# Patient Record
Sex: MALE | Race: WHITE | Employment: PART TIME | URBAN - METROPOLITAN AREA
[De-identification: names, ages, dates, MRNs, and addresses within clinical notes are randomized per-mention and may not be internally consistent; named-entity substitution may affect disease eponyms.]

---

## 2018-05-03 ENCOUNTER — OFFICE VISIT (OUTPATIENT)
Dept: PRIMARY CARE CLINIC | Age: 48
End: 2018-05-03
Payer: COMMERCIAL

## 2018-05-03 VITALS
WEIGHT: 210.8 LBS | TEMPERATURE: 97.3 F | BODY MASS INDEX: 31.95 KG/M2 | DIASTOLIC BLOOD PRESSURE: 80 MMHG | RESPIRATION RATE: 16 BRPM | HEART RATE: 87 BPM | SYSTOLIC BLOOD PRESSURE: 150 MMHG | OXYGEN SATURATION: 96 % | HEIGHT: 68 IN

## 2018-05-03 DIAGNOSIS — E03.8 OTHER SPECIFIED HYPOTHYROIDISM: ICD-10-CM

## 2018-05-03 DIAGNOSIS — I10 ESSENTIAL HYPERTENSION: ICD-10-CM

## 2018-05-03 DIAGNOSIS — G47.33 OBSTRUCTIVE SLEEP APNEA SYNDROME: ICD-10-CM

## 2018-05-03 DIAGNOSIS — M25.561 CHRONIC PAIN OF BOTH KNEES: ICD-10-CM

## 2018-05-03 DIAGNOSIS — M25.562 CHRONIC PAIN OF BOTH KNEES: ICD-10-CM

## 2018-05-03 DIAGNOSIS — M79.642 BILATERAL HAND PAIN: ICD-10-CM

## 2018-05-03 DIAGNOSIS — E78.00 PURE HYPERCHOLESTEROLEMIA: ICD-10-CM

## 2018-05-03 DIAGNOSIS — M25.561 CHRONIC PAIN OF BOTH KNEES: Primary | ICD-10-CM

## 2018-05-03 DIAGNOSIS — Z12.5 PROSTATE CANCER SCREENING: ICD-10-CM

## 2018-05-03 DIAGNOSIS — G89.29 CHRONIC PAIN OF BOTH KNEES: Primary | ICD-10-CM

## 2018-05-03 DIAGNOSIS — R06.02 SOB (SHORTNESS OF BREATH): ICD-10-CM

## 2018-05-03 DIAGNOSIS — G44.59 OTHER COMPLICATED HEADACHE SYNDROME: ICD-10-CM

## 2018-05-03 DIAGNOSIS — M25.562 CHRONIC PAIN OF BOTH KNEES: Primary | ICD-10-CM

## 2018-05-03 DIAGNOSIS — M79.641 BILATERAL HAND PAIN: ICD-10-CM

## 2018-05-03 DIAGNOSIS — E53.8 VITAMIN B 12 DEFICIENCY: ICD-10-CM

## 2018-05-03 DIAGNOSIS — Z91.030 ALLERGY TO BEE STING: ICD-10-CM

## 2018-05-03 DIAGNOSIS — G89.29 CHRONIC PAIN OF BOTH KNEES: ICD-10-CM

## 2018-05-03 LAB
ALBUMIN SERPL-MCNC: 4.6 G/DL (ref 3.9–4.9)
ALP BLD-CCNC: 115 U/L (ref 35–104)
ALT SERPL-CCNC: 26 U/L (ref 0–41)
ANION GAP SERPL CALCULATED.3IONS-SCNC: 19 MEQ/L (ref 7–13)
AST SERPL-CCNC: 30 U/L (ref 0–40)
BASOPHILS ABSOLUTE: 0 K/UL (ref 0–0.2)
BASOPHILS RELATIVE PERCENT: 0.4 %
BILIRUB SERPL-MCNC: 0.3 MG/DL (ref 0–1.2)
BUN BLDV-MCNC: 15 MG/DL (ref 6–20)
CALCIUM SERPL-MCNC: 9.4 MG/DL (ref 8.6–10.2)
CHLORIDE BLD-SCNC: 101 MEQ/L (ref 98–107)
CHOLESTEROL, TOTAL: 195 MG/DL (ref 0–199)
CO2: 23 MEQ/L (ref 22–29)
CREAT SERPL-MCNC: 0.81 MG/DL (ref 0.7–1.2)
EOSINOPHILS ABSOLUTE: 0.3 K/UL (ref 0–0.7)
EOSINOPHILS RELATIVE PERCENT: 3.5 %
GFR AFRICAN AMERICAN: >60
GFR NON-AFRICAN AMERICAN: >60
GLOBULIN: 2.7 G/DL (ref 2.3–3.5)
GLUCOSE BLD-MCNC: 72 MG/DL (ref 74–109)
HCT VFR BLD CALC: 46 % (ref 42–52)
HDLC SERPL-MCNC: 38 MG/DL (ref 40–59)
HEMOGLOBIN: 15.9 G/DL (ref 14–18)
LDL CHOLESTEROL CALCULATED: 122 MG/DL (ref 0–129)
LYMPHOCYTES ABSOLUTE: 2.4 K/UL (ref 1–4.8)
LYMPHOCYTES RELATIVE PERCENT: 27.1 %
MCH RBC QN AUTO: 32.3 PG (ref 27–31.3)
MCHC RBC AUTO-ENTMCNC: 34.7 % (ref 33–37)
MCV RBC AUTO: 93.2 FL (ref 80–100)
MONOCYTES ABSOLUTE: 0.7 K/UL (ref 0.2–0.8)
MONOCYTES RELATIVE PERCENT: 8.4 %
NEUTROPHILS ABSOLUTE: 5.4 K/UL (ref 1.4–6.5)
NEUTROPHILS RELATIVE PERCENT: 60.6 %
PDW BLD-RTO: 14.1 % (ref 11.5–14.5)
PLATELET # BLD: 241 K/UL (ref 130–400)
POTASSIUM SERPL-SCNC: 4.5 MEQ/L (ref 3.5–5.1)
PROSTATE SPECIFIC ANTIGEN: 0.51 NG/ML
RBC # BLD: 4.93 M/UL (ref 4.7–6.1)
RHEUMATOID FACTOR: <10 IU/ML (ref 0–14)
SODIUM BLD-SCNC: 143 MEQ/L (ref 132–144)
TOTAL PROTEIN: 7.3 G/DL (ref 6.4–8.1)
TRIGL SERPL-MCNC: 175 MG/DL (ref 0–200)
TSH SERPL DL<=0.05 MIU/L-ACNC: 0.96 UIU/ML (ref 0.27–4.2)
VITAMIN B-12: 554 PG/ML (ref 232–1245)
WBC # BLD: 8.9 K/UL (ref 4.8–10.8)

## 2018-05-03 PROCEDURE — 4004F PT TOBACCO SCREEN RCVD TLK: CPT | Performed by: INTERNAL MEDICINE

## 2018-05-03 PROCEDURE — 99203 OFFICE O/P NEW LOW 30 MIN: CPT | Performed by: INTERNAL MEDICINE

## 2018-05-03 PROCEDURE — G8427 DOCREV CUR MEDS BY ELIG CLIN: HCPCS | Performed by: INTERNAL MEDICINE

## 2018-05-03 PROCEDURE — G8417 CALC BMI ABV UP PARAM F/U: HCPCS | Performed by: INTERNAL MEDICINE

## 2018-05-03 RX ORDER — LISINOPRIL 20 MG/1
20 TABLET ORAL DAILY
Qty: 30 TABLET | Refills: 5 | Status: SHIPPED | OUTPATIENT
Start: 2018-05-03 | End: 2018-05-14

## 2018-05-03 RX ORDER — EPINEPHRINE 0.3 MG/.3ML
0.3 INJECTION SUBCUTANEOUS
COMMUNITY
Start: 2012-04-24 | End: 2018-05-03 | Stop reason: SDUPTHER

## 2018-05-03 RX ORDER — EPINEPHRINE 0.3 MG/.3ML
0.3 INJECTION SUBCUTANEOUS ONCE
Qty: 2 EACH | Refills: 2 | Status: SHIPPED | OUTPATIENT
Start: 2018-05-03 | End: 2019-01-21 | Stop reason: SDUPTHER

## 2018-05-03 RX ORDER — BUTALBITAL, ACETAMINOPHEN AND CAFFEINE 50; 325; 40 MG/1; MG/1; MG/1
1 TABLET ORAL EVERY 6 HOURS PRN
Qty: 60 TABLET | Refills: 3 | Status: SHIPPED | OUTPATIENT
Start: 2018-05-03 | End: 2019-01-21

## 2018-05-03 RX ORDER — IBUPROFEN 800 MG/1
800 TABLET ORAL 2 TIMES DAILY PRN
Qty: 60 TABLET | Refills: 5 | Status: SHIPPED | OUTPATIENT
Start: 2018-05-03 | End: 2018-11-10 | Stop reason: SDUPTHER

## 2018-05-03 RX ORDER — ALBUTEROL SULFATE 90 UG/1
2 AEROSOL, METERED RESPIRATORY (INHALATION) EVERY 6 HOURS PRN
Qty: 1 INHALER | Refills: 3 | Status: SHIPPED | OUTPATIENT
Start: 2018-05-03 | End: 2018-08-24 | Stop reason: SDUPTHER

## 2018-05-03 ASSESSMENT — PATIENT HEALTH QUESTIONNAIRE - PHQ9
SUM OF ALL RESPONSES TO PHQ9 QUESTIONS 1 & 2: 0
2. FEELING DOWN, DEPRESSED OR HOPELESS: 0
1. LITTLE INTEREST OR PLEASURE IN DOING THINGS: 0
SUM OF ALL RESPONSES TO PHQ QUESTIONS 1-9: 0

## 2018-05-04 LAB
ANA INTERPRETATION: NORMAL
ANTI-NUCLEAR ANTIBODY (ANA): NEGATIVE

## 2018-05-07 ASSESSMENT — ENCOUNTER SYMPTOMS
PHOTOPHOBIA: 0
TROUBLE SWALLOWING: 0
VOMITING: 0
NAUSEA: 0
CHOKING: 0
SHORTNESS OF BREATH: 1
VOICE CHANGE: 0
COUGH: 1

## 2018-05-14 ENCOUNTER — OFFICE VISIT (OUTPATIENT)
Dept: PRIMARY CARE CLINIC | Age: 48
End: 2018-05-14
Payer: COMMERCIAL

## 2018-05-14 VITALS
RESPIRATION RATE: 16 BRPM | WEIGHT: 213.1 LBS | HEIGHT: 68 IN | DIASTOLIC BLOOD PRESSURE: 84 MMHG | SYSTOLIC BLOOD PRESSURE: 158 MMHG | BODY MASS INDEX: 32.3 KG/M2 | OXYGEN SATURATION: 97 % | HEART RATE: 69 BPM | TEMPERATURE: 98.3 F

## 2018-05-14 DIAGNOSIS — I10 ESSENTIAL HYPERTENSION: Primary | ICD-10-CM

## 2018-05-14 DIAGNOSIS — F17.200 SMOKER: ICD-10-CM

## 2018-05-14 PROCEDURE — G8417 CALC BMI ABV UP PARAM F/U: HCPCS | Performed by: INTERNAL MEDICINE

## 2018-05-14 PROCEDURE — 99213 OFFICE O/P EST LOW 20 MIN: CPT | Performed by: INTERNAL MEDICINE

## 2018-05-14 PROCEDURE — 4004F PT TOBACCO SCREEN RCVD TLK: CPT | Performed by: INTERNAL MEDICINE

## 2018-05-14 PROCEDURE — G8427 DOCREV CUR MEDS BY ELIG CLIN: HCPCS | Performed by: INTERNAL MEDICINE

## 2018-05-14 RX ORDER — VARENICLINE TARTRATE 1 MG/1
1 TABLET, FILM COATED ORAL 2 TIMES DAILY
Qty: 60 TABLET | Refills: 5 | Status: SHIPPED | OUTPATIENT
Start: 2018-05-14 | End: 2019-01-21

## 2018-05-14 RX ORDER — AMLODIPINE BESYLATE 5 MG/1
5 TABLET ORAL DAILY
Qty: 30 TABLET | Refills: 5 | Status: SHIPPED | OUTPATIENT
Start: 2018-05-14 | End: 2019-01-24 | Stop reason: SDUPTHER

## 2018-05-14 RX ORDER — VARENICLINE TARTRATE 25 MG
KIT ORAL
Qty: 1 EACH | Refills: 0 | Status: SHIPPED | OUTPATIENT
Start: 2018-05-14 | End: 2018-07-10 | Stop reason: SDUPTHER

## 2018-05-17 ASSESSMENT — ENCOUNTER SYMPTOMS
TROUBLE SWALLOWING: 0
PHOTOPHOBIA: 0
SHORTNESS OF BREATH: 0
VOICE CHANGE: 0
NAUSEA: 0
VOMITING: 0
CHOKING: 0

## 2018-07-10 DIAGNOSIS — F17.200 SMOKER: ICD-10-CM

## 2018-07-10 RX ORDER — VARENICLINE TARTRATE
KIT
Qty: 53 TABLET | Refills: 0 | Status: SHIPPED | OUTPATIENT
Start: 2018-07-10 | End: 2018-08-06 | Stop reason: SDUPTHER

## 2018-08-06 DIAGNOSIS — F17.200 SMOKER: ICD-10-CM

## 2018-08-06 RX ORDER — VARENICLINE TARTRATE
KIT
Qty: 53 TABLET | Refills: 0 | Status: SHIPPED | OUTPATIENT
Start: 2018-08-06 | End: 2019-01-21

## 2018-08-24 DIAGNOSIS — R06.02 SOB (SHORTNESS OF BREATH): ICD-10-CM

## 2018-11-10 DIAGNOSIS — G44.59 OTHER COMPLICATED HEADACHE SYNDROME: ICD-10-CM

## 2018-11-10 DIAGNOSIS — M25.562 CHRONIC PAIN OF BOTH KNEES: ICD-10-CM

## 2018-11-10 DIAGNOSIS — M25.561 CHRONIC PAIN OF BOTH KNEES: ICD-10-CM

## 2018-11-10 DIAGNOSIS — G89.29 CHRONIC PAIN OF BOTH KNEES: ICD-10-CM

## 2018-11-10 DIAGNOSIS — M79.642 BILATERAL HAND PAIN: ICD-10-CM

## 2018-11-10 DIAGNOSIS — M79.641 BILATERAL HAND PAIN: ICD-10-CM

## 2018-11-12 RX ORDER — IBUPROFEN 800 MG/1
TABLET ORAL
Qty: 60 TABLET | Refills: 0 | Status: SHIPPED | OUTPATIENT
Start: 2018-11-12 | End: 2019-01-21 | Stop reason: SDUPTHER

## 2019-01-16 ENCOUNTER — TELEPHONE (OUTPATIENT)
Dept: PRIMARY CARE CLINIC | Age: 49
End: 2019-01-16

## 2019-01-21 ENCOUNTER — OFFICE VISIT (OUTPATIENT)
Dept: PRIMARY CARE CLINIC | Age: 49
End: 2019-01-21
Payer: COMMERCIAL

## 2019-01-21 VITALS
DIASTOLIC BLOOD PRESSURE: 99 MMHG | RESPIRATION RATE: 14 BRPM | WEIGHT: 229 LBS | HEIGHT: 68 IN | OXYGEN SATURATION: 97 % | HEART RATE: 67 BPM | TEMPERATURE: 98.1 F | BODY MASS INDEX: 34.71 KG/M2 | SYSTOLIC BLOOD PRESSURE: 158 MMHG

## 2019-01-21 DIAGNOSIS — I10 ESSENTIAL HYPERTENSION: ICD-10-CM

## 2019-01-21 DIAGNOSIS — G89.29 CHRONIC PAIN OF BOTH KNEES: ICD-10-CM

## 2019-01-21 DIAGNOSIS — F17.200 SMOKER: ICD-10-CM

## 2019-01-21 DIAGNOSIS — F19.982 DRUG-INDUCED INSOMNIA (HCC): ICD-10-CM

## 2019-01-21 DIAGNOSIS — R06.02 SOB (SHORTNESS OF BREATH): Primary | ICD-10-CM

## 2019-01-21 DIAGNOSIS — Z91.030 ALLERGY TO BEE STING: ICD-10-CM

## 2019-01-21 DIAGNOSIS — M25.562 CHRONIC PAIN OF BOTH KNEES: ICD-10-CM

## 2019-01-21 DIAGNOSIS — M25.561 CHRONIC PAIN OF BOTH KNEES: ICD-10-CM

## 2019-01-21 DIAGNOSIS — R07.9 CHEST PAIN, UNSPECIFIED TYPE: ICD-10-CM

## 2019-01-21 PROCEDURE — 99214 OFFICE O/P EST MOD 30 MIN: CPT | Performed by: INTERNAL MEDICINE

## 2019-01-21 PROCEDURE — 96372 THER/PROPH/DIAG INJ SC/IM: CPT | Performed by: INTERNAL MEDICINE

## 2019-01-21 PROCEDURE — G8417 CALC BMI ABV UP PARAM F/U: HCPCS | Performed by: INTERNAL MEDICINE

## 2019-01-21 PROCEDURE — G8484 FLU IMMUNIZE NO ADMIN: HCPCS | Performed by: INTERNAL MEDICINE

## 2019-01-21 PROCEDURE — 4004F PT TOBACCO SCREEN RCVD TLK: CPT | Performed by: INTERNAL MEDICINE

## 2019-01-21 PROCEDURE — G8427 DOCREV CUR MEDS BY ELIG CLIN: HCPCS | Performed by: INTERNAL MEDICINE

## 2019-01-21 RX ORDER — ALBUTEROL SULFATE 90 UG/1
2 AEROSOL, METERED RESPIRATORY (INHALATION) EVERY 6 HOURS PRN
Qty: 18 G | Refills: 5 | Status: SHIPPED | OUTPATIENT
Start: 2019-01-21 | End: 2021-08-16 | Stop reason: SDUPTHER

## 2019-01-21 RX ORDER — LOSARTAN POTASSIUM 100 MG/1
100 TABLET ORAL DAILY
Qty: 30 TABLET | Refills: 5 | Status: SHIPPED | OUTPATIENT
Start: 2019-01-21 | End: 2019-07-15 | Stop reason: SDUPTHER

## 2019-01-21 RX ORDER — KETOROLAC TROMETHAMINE 30 MG/ML
60 INJECTION, SOLUTION INTRAMUSCULAR; INTRAVENOUS ONCE
Status: COMPLETED | OUTPATIENT
Start: 2019-01-21 | End: 2019-01-21

## 2019-01-21 RX ORDER — EPINEPHRINE 0.3 MG/.3ML
0.3 INJECTION SUBCUTANEOUS ONCE
Qty: 2 EACH | Refills: 2 | Status: SHIPPED | OUTPATIENT
Start: 2019-01-21 | End: 2021-08-16 | Stop reason: SDUPTHER

## 2019-01-21 RX ORDER — IBUPROFEN 800 MG/1
800 TABLET ORAL 2 TIMES DAILY PRN
Qty: 60 TABLET | Refills: 1 | Status: SHIPPED | OUTPATIENT
Start: 2019-01-21 | End: 2021-08-16 | Stop reason: SDUPTHER

## 2019-01-21 RX ORDER — TRAZODONE HYDROCHLORIDE 150 MG/1
150 TABLET ORAL NIGHTLY PRN
Qty: 30 TABLET | Refills: 2 | Status: SHIPPED | OUTPATIENT
Start: 2019-01-21 | End: 2021-08-16 | Stop reason: SDUPTHER

## 2019-01-21 RX ADMIN — KETOROLAC TROMETHAMINE 60 MG: 30 INJECTION, SOLUTION INTRAMUSCULAR; INTRAVENOUS at 16:53

## 2019-01-24 DIAGNOSIS — I10 ESSENTIAL HYPERTENSION: ICD-10-CM

## 2019-01-24 RX ORDER — AMLODIPINE BESYLATE 5 MG/1
5 TABLET ORAL DAILY
Qty: 30 TABLET | Refills: 5 | Status: SHIPPED | OUTPATIENT
Start: 2019-01-24 | End: 2019-07-15 | Stop reason: SDUPTHER

## 2019-01-24 ASSESSMENT — ENCOUNTER SYMPTOMS
RHINORRHEA: 1
SHORTNESS OF BREATH: 1
VOICE CHANGE: 0
VOMITING: 0
CHEST TIGHTNESS: 1
SORE THROAT: 1
PHOTOPHOBIA: 0
NAUSEA: 0
WHEEZING: 1
ABDOMINAL PAIN: 0
TROUBLE SWALLOWING: 0
HEMOPTYSIS: 0
CHOKING: 0

## 2019-01-24 ASSESSMENT — COPD QUESTIONNAIRES: COPD: 1

## 2019-01-31 ENCOUNTER — OFFICE VISIT (OUTPATIENT)
Dept: CARDIOLOGY CLINIC | Age: 49
End: 2019-01-31
Payer: COMMERCIAL

## 2019-01-31 VITALS
BODY MASS INDEX: 34.71 KG/M2 | DIASTOLIC BLOOD PRESSURE: 84 MMHG | RESPIRATION RATE: 20 BRPM | HEIGHT: 68 IN | WEIGHT: 229 LBS | SYSTOLIC BLOOD PRESSURE: 130 MMHG | HEART RATE: 58 BPM | OXYGEN SATURATION: 99 %

## 2019-01-31 DIAGNOSIS — Z86.711 PERSONAL HISTORY OF PULMONARY EMBOLISM: ICD-10-CM

## 2019-01-31 DIAGNOSIS — Z87.891 HISTORY OF CIGARETTE SMOKING: ICD-10-CM

## 2019-01-31 DIAGNOSIS — R07.89 OTHER CHEST PAIN: Primary | ICD-10-CM

## 2019-01-31 DIAGNOSIS — Z86.718 PERSONAL HISTORY OF DVT (DEEP VEIN THROMBOSIS): ICD-10-CM

## 2019-01-31 DIAGNOSIS — J44.9 CHRONIC OBSTRUCTIVE PULMONARY DISEASE, UNSPECIFIED COPD TYPE (HCC): ICD-10-CM

## 2019-01-31 PROCEDURE — G8484 FLU IMMUNIZE NO ADMIN: HCPCS | Performed by: INTERNAL MEDICINE

## 2019-01-31 PROCEDURE — 3023F SPIROM DOC REV: CPT | Performed by: INTERNAL MEDICINE

## 2019-01-31 PROCEDURE — G8427 DOCREV CUR MEDS BY ELIG CLIN: HCPCS | Performed by: INTERNAL MEDICINE

## 2019-01-31 PROCEDURE — G8926 SPIRO NO PERF OR DOC: HCPCS | Performed by: INTERNAL MEDICINE

## 2019-01-31 PROCEDURE — 99243 OFF/OP CNSLTJ NEW/EST LOW 30: CPT | Performed by: INTERNAL MEDICINE

## 2019-01-31 PROCEDURE — G8417 CALC BMI ABV UP PARAM F/U: HCPCS | Performed by: INTERNAL MEDICINE

## 2019-01-31 ASSESSMENT — ENCOUNTER SYMPTOMS
APNEA: 0
DIARRHEA: 0
FACIAL SWELLING: 0
COUGH: 0
SHORTNESS OF BREATH: 1
ABDOMINAL PAIN: 0
TROUBLE SWALLOWING: 0
COLOR CHANGE: 0
CHEST TIGHTNESS: 0
VOICE CHANGE: 0
ANAL BLEEDING: 0
WHEEZING: 0
BLOOD IN STOOL: 0
NAUSEA: 0
VOMITING: 0
ABDOMINAL DISTENTION: 0

## 2019-07-15 DIAGNOSIS — I10 ESSENTIAL HYPERTENSION: ICD-10-CM

## 2019-07-15 RX ORDER — LOSARTAN POTASSIUM 100 MG/1
100 TABLET ORAL DAILY
Qty: 30 TABLET | Refills: 0 | Status: SHIPPED | OUTPATIENT
Start: 2019-07-15 | End: 2019-08-22 | Stop reason: SDUPTHER

## 2019-07-15 RX ORDER — AMLODIPINE BESYLATE 5 MG/1
5 TABLET ORAL DAILY
Qty: 30 TABLET | Refills: 0 | Status: SHIPPED | OUTPATIENT
Start: 2019-07-15 | End: 2019-08-22 | Stop reason: SDUPTHER

## 2019-08-22 DIAGNOSIS — I10 ESSENTIAL HYPERTENSION: ICD-10-CM

## 2019-08-22 RX ORDER — LOSARTAN POTASSIUM 100 MG/1
100 TABLET ORAL DAILY
Qty: 30 TABLET | Refills: 0 | Status: SHIPPED | OUTPATIENT
Start: 2019-08-22 | End: 2019-09-22 | Stop reason: SDUPTHER

## 2019-08-22 RX ORDER — AMLODIPINE BESYLATE 5 MG/1
5 TABLET ORAL DAILY
Qty: 30 TABLET | Refills: 0 | Status: SHIPPED | OUTPATIENT
Start: 2019-08-22 | End: 2019-09-22 | Stop reason: SDUPTHER

## 2019-09-22 DIAGNOSIS — I10 ESSENTIAL HYPERTENSION: ICD-10-CM

## 2019-09-22 RX ORDER — AMLODIPINE BESYLATE 5 MG/1
5 TABLET ORAL DAILY
Qty: 30 TABLET | Refills: 0 | Status: ON HOLD | OUTPATIENT
Start: 2019-09-22 | End: 2021-08-06 | Stop reason: HOSPADM

## 2019-09-22 RX ORDER — LOSARTAN POTASSIUM 100 MG/1
100 TABLET ORAL DAILY
Qty: 30 TABLET | Refills: 0 | Status: SHIPPED | OUTPATIENT
Start: 2019-09-22 | End: 2021-08-16 | Stop reason: SDUPTHER

## 2019-09-22 NOTE — TELEPHONE ENCOUNTER
Pharmacy requesting medication refill. Please approve or deny this request.    Rx requested:  Requested Prescriptions     Pending Prescriptions Disp Refills    amLODIPine (NORVASC) 5 MG tablet [Pharmacy Med Name: AMLODIPINE BESYLATE 5MG TABLETS] 30 tablet 0     Sig: TAKE 1 TABLET BY MOUTH DAILY    losartan (COZAAR) 100 MG tablet [Pharmacy Med Name: LOSARTAN 100MG TABLETS] 30 tablet 0     Sig: TAKE 1 TABLET BY MOUTH DAILY         Last Office Visit:   1/21/2019      Next Visit Date:  No future appointments.

## 2021-08-03 ENCOUNTER — HOSPITAL ENCOUNTER (INPATIENT)
Age: 51
LOS: 2 days | Discharge: HOME | DRG: 139 | End: 2021-08-06
Attending: EMERGENCY MEDICINE | Admitting: INTERNAL MEDICINE
Payer: COMMERCIAL

## 2021-08-03 ENCOUNTER — APPOINTMENT (OUTPATIENT)
Dept: CT IMAGING | Age: 51
DRG: 139 | End: 2021-08-03
Payer: COMMERCIAL

## 2021-08-03 DIAGNOSIS — F17.200 SMOKING: ICD-10-CM

## 2021-08-03 DIAGNOSIS — J44.9 CHRONIC OBSTRUCTIVE PULMONARY DISEASE, UNSPECIFIED COPD TYPE (HCC): ICD-10-CM

## 2021-08-03 DIAGNOSIS — J44.1 COPD EXACERBATION (HCC): ICD-10-CM

## 2021-08-03 DIAGNOSIS — R93.89 ABNORMAL CT SCAN, CHEST: ICD-10-CM

## 2021-08-03 DIAGNOSIS — J98.59 MEDIASTINAL MASS: ICD-10-CM

## 2021-08-03 DIAGNOSIS — R07.9 CHEST PAIN, UNSPECIFIED TYPE: Primary | ICD-10-CM

## 2021-08-03 DIAGNOSIS — R91.1 LUNG NODULE: ICD-10-CM

## 2021-08-03 DIAGNOSIS — R06.02 SHORTNESS OF BREATH: ICD-10-CM

## 2021-08-03 PROBLEM — I10 HTN (HYPERTENSION): Status: ACTIVE | Noted: 2021-08-03

## 2021-08-03 PROBLEM — E78.5 HLD (HYPERLIPIDEMIA): Status: ACTIVE | Noted: 2021-08-03

## 2021-08-03 LAB
ALBUMIN SERPL-MCNC: 4.7 G/DL (ref 3.5–4.6)
ALP BLD-CCNC: 94 U/L (ref 35–104)
ALT SERPL-CCNC: 24 U/L (ref 0–41)
ANION GAP SERPL CALCULATED.3IONS-SCNC: 17 MEQ/L (ref 9–15)
AST SERPL-CCNC: 26 U/L (ref 0–40)
BASOPHILS ABSOLUTE: 0.1 K/UL (ref 0–0.2)
BASOPHILS RELATIVE PERCENT: 0.6 %
BILIRUB SERPL-MCNC: 0.3 MG/DL (ref 0.2–0.7)
BUN BLDV-MCNC: 11 MG/DL (ref 6–20)
CALCIUM SERPL-MCNC: 9.1 MG/DL (ref 8.5–9.9)
CHLORIDE BLD-SCNC: 102 MEQ/L (ref 95–107)
CHOLESTEROL, TOTAL: 156 MG/DL (ref 0–199)
CO2: 23 MEQ/L (ref 20–31)
CREAT SERPL-MCNC: 0.75 MG/DL (ref 0.7–1.2)
EOSINOPHILS ABSOLUTE: 0.1 K/UL (ref 0–0.7)
EOSINOPHILS RELATIVE PERCENT: 0.7 %
GFR AFRICAN AMERICAN: >60
GFR AFRICAN AMERICAN: >60
GFR NON-AFRICAN AMERICAN: >60
GFR NON-AFRICAN AMERICAN: >60
GLOBULIN: 2.5 G/DL (ref 2.3–3.5)
GLUCOSE BLD-MCNC: 96 MG/DL (ref 70–99)
HCT VFR BLD CALC: 43.8 % (ref 42–52)
HDLC SERPL-MCNC: 29 MG/DL (ref 40–59)
HEMOGLOBIN: 14.9 G/DL (ref 14–18)
LDL CHOLESTEROL CALCULATED: 85 MG/DL (ref 0–129)
LIPASE: 23 U/L (ref 12–95)
LV EF: 60 %
LVEF MODALITY: NORMAL
LYMPHOCYTES ABSOLUTE: 2.4 K/UL (ref 1–4.8)
LYMPHOCYTES RELATIVE PERCENT: 27.6 %
MAGNESIUM: 2.1 MG/DL (ref 1.7–2.4)
MCH RBC QN AUTO: 31.4 PG (ref 27–31.3)
MCHC RBC AUTO-ENTMCNC: 33.9 % (ref 33–37)
MCV RBC AUTO: 92.6 FL (ref 80–100)
MONOCYTES ABSOLUTE: 0.5 K/UL (ref 0.2–0.8)
MONOCYTES RELATIVE PERCENT: 5.6 %
NEUTROPHILS ABSOLUTE: 5.8 K/UL (ref 1.4–6.5)
NEUTROPHILS RELATIVE PERCENT: 65.5 %
PDW BLD-RTO: 14 % (ref 11.5–14.5)
PERFORMED ON: NORMAL
PLATELET # BLD: 238 K/UL (ref 130–400)
POC CREATININE WHOLE BLOOD: 0.9
POC CREATININE: 0.9 MG/DL (ref 0.9–1.3)
POC SAMPLE TYPE: NORMAL
POTASSIUM SERPL-SCNC: 3.9 MEQ/L (ref 3.4–4.9)
RBC # BLD: 4.73 M/UL (ref 4.7–6.1)
SARS-COV-2, NAAT: NOT DETECTED
SODIUM BLD-SCNC: 142 MEQ/L (ref 135–144)
TOTAL PROTEIN: 7.2 G/DL (ref 6.3–8)
TRIGL SERPL-MCNC: 209 MG/DL (ref 0–150)
TROPONIN: <0.01 NG/ML (ref 0–0.01)
TROPONIN: <0.01 NG/ML (ref 0–0.01)
WBC # BLD: 8.8 K/UL (ref 4.8–10.8)

## 2021-08-03 PROCEDURE — 85025 COMPLETE CBC W/AUTO DIFF WBC: CPT

## 2021-08-03 PROCEDURE — G0378 HOSPITAL OBSERVATION PER HR: HCPCS

## 2021-08-03 PROCEDURE — 2580000003 HC RX 258: Performed by: STUDENT IN AN ORGANIZED HEALTH CARE EDUCATION/TRAINING PROGRAM

## 2021-08-03 PROCEDURE — 2580000003 HC RX 258: Performed by: NURSE PRACTITIONER

## 2021-08-03 PROCEDURE — 80053 COMPREHEN METABOLIC PANEL: CPT

## 2021-08-03 PROCEDURE — 6370000000 HC RX 637 (ALT 250 FOR IP): Performed by: INTERNAL MEDICINE

## 2021-08-03 PROCEDURE — 87040 BLOOD CULTURE FOR BACTERIA: CPT

## 2021-08-03 PROCEDURE — 94664 DEMO&/EVAL PT USE INHALER: CPT

## 2021-08-03 PROCEDURE — 6360000004 HC RX CONTRAST MEDICATION: Performed by: STUDENT IN AN ORGANIZED HEALTH CARE EDUCATION/TRAINING PROGRAM

## 2021-08-03 PROCEDURE — 94640 AIRWAY INHALATION TREATMENT: CPT

## 2021-08-03 PROCEDURE — 93005 ELECTROCARDIOGRAM TRACING: CPT | Performed by: NURSE PRACTITIONER

## 2021-08-03 PROCEDURE — 87635 SARS-COV-2 COVID-19 AMP PRB: CPT

## 2021-08-03 PROCEDURE — 84484 ASSAY OF TROPONIN QUANT: CPT

## 2021-08-03 PROCEDURE — 96372 THER/PROPH/DIAG INJ SC/IM: CPT

## 2021-08-03 PROCEDURE — 36415 COLL VENOUS BLD VENIPUNCTURE: CPT

## 2021-08-03 PROCEDURE — 80061 LIPID PANEL: CPT

## 2021-08-03 PROCEDURE — 6370000000 HC RX 637 (ALT 250 FOR IP): Performed by: STUDENT IN AN ORGANIZED HEALTH CARE EDUCATION/TRAINING PROGRAM

## 2021-08-03 PROCEDURE — 71275 CT ANGIOGRAPHY CHEST: CPT

## 2021-08-03 PROCEDURE — 83690 ASSAY OF LIPASE: CPT

## 2021-08-03 PROCEDURE — 83735 ASSAY OF MAGNESIUM: CPT

## 2021-08-03 PROCEDURE — 6360000002 HC RX W HCPCS: Performed by: NURSE PRACTITIONER

## 2021-08-03 PROCEDURE — 6360000002 HC RX W HCPCS: Performed by: STUDENT IN AN ORGANIZED HEALTH CARE EDUCATION/TRAINING PROGRAM

## 2021-08-03 PROCEDURE — 93005 ELECTROCARDIOGRAM TRACING: CPT | Performed by: PERSONAL EMERGENCY RESPONSE ATTENDANT

## 2021-08-03 PROCEDURE — 94761 N-INVAS EAR/PLS OXIMETRY MLT: CPT

## 2021-08-03 PROCEDURE — 99285 EMERGENCY DEPT VISIT HI MDM: CPT

## 2021-08-03 PROCEDURE — 96374 THER/PROPH/DIAG INJ IV PUSH: CPT

## 2021-08-03 PROCEDURE — 99245 OFF/OP CONSLTJ NEW/EST HI 55: CPT | Performed by: INTERNAL MEDICINE

## 2021-08-03 PROCEDURE — 93306 TTE W/DOPPLER COMPLETE: CPT

## 2021-08-03 RX ORDER — SODIUM CHLORIDE 0.9 % (FLUSH) 0.9 %
5-40 SYRINGE (ML) INJECTION PRN
Status: DISCONTINUED | OUTPATIENT
Start: 2021-08-03 | End: 2021-08-06 | Stop reason: HOSPADM

## 2021-08-03 RX ORDER — ACETAMINOPHEN 650 MG/1
650 SUPPOSITORY RECTAL EVERY 6 HOURS PRN
Status: DISCONTINUED | OUTPATIENT
Start: 2021-08-03 | End: 2021-08-06 | Stop reason: HOSPADM

## 2021-08-03 RX ORDER — ISOSORBIDE MONONITRATE 30 MG/1
30 TABLET, EXTENDED RELEASE ORAL DAILY
Status: DISCONTINUED | OUTPATIENT
Start: 2021-08-03 | End: 2021-08-06 | Stop reason: HOSPADM

## 2021-08-03 RX ORDER — ACETAMINOPHEN 325 MG/1
650 TABLET ORAL EVERY 6 HOURS PRN
Status: DISCONTINUED | OUTPATIENT
Start: 2021-08-03 | End: 2021-08-06 | Stop reason: HOSPADM

## 2021-08-03 RX ORDER — ACETAMINOPHEN 500 MG
1000 TABLET ORAL ONCE
Status: COMPLETED | OUTPATIENT
Start: 2021-08-03 | End: 2021-08-03

## 2021-08-03 RX ORDER — PREDNISONE 10 MG/1
40 TABLET ORAL DAILY
Status: DISCONTINUED | OUTPATIENT
Start: 2021-08-03 | End: 2021-08-05

## 2021-08-03 RX ORDER — HYDRALAZINE HYDROCHLORIDE 20 MG/ML
10 INJECTION INTRAMUSCULAR; INTRAVENOUS EVERY 4 HOURS PRN
Status: DISCONTINUED | OUTPATIENT
Start: 2021-08-03 | End: 2021-08-06 | Stop reason: HOSPADM

## 2021-08-03 RX ORDER — SODIUM CHLORIDE 9 MG/ML
25 INJECTION, SOLUTION INTRAVENOUS PRN
Status: DISCONTINUED | OUTPATIENT
Start: 2021-08-03 | End: 2021-08-06 | Stop reason: HOSPADM

## 2021-08-03 RX ORDER — 0.9 % SODIUM CHLORIDE 0.9 %
1000 INTRAVENOUS SOLUTION INTRAVENOUS ONCE
Status: COMPLETED | OUTPATIENT
Start: 2021-08-03 | End: 2021-08-03

## 2021-08-03 RX ORDER — PANTOPRAZOLE SODIUM 40 MG/1
40 TABLET, DELAYED RELEASE ORAL
Status: DISCONTINUED | OUTPATIENT
Start: 2021-08-04 | End: 2021-08-03

## 2021-08-03 RX ORDER — SODIUM CHLORIDE 0.9 % (FLUSH) 0.9 %
5-40 SYRINGE (ML) INJECTION EVERY 12 HOURS SCHEDULED
Status: DISCONTINUED | OUTPATIENT
Start: 2021-08-03 | End: 2021-08-06 | Stop reason: HOSPADM

## 2021-08-03 RX ORDER — GUAIFENESIN 600 MG/1
600 TABLET, EXTENDED RELEASE ORAL 2 TIMES DAILY
Status: DISCONTINUED | OUTPATIENT
Start: 2021-08-03 | End: 2021-08-06 | Stop reason: HOSPADM

## 2021-08-03 RX ORDER — ONDANSETRON 4 MG/1
4 TABLET, ORALLY DISINTEGRATING ORAL EVERY 8 HOURS PRN
Status: DISCONTINUED | OUTPATIENT
Start: 2021-08-03 | End: 2021-08-06 | Stop reason: HOSPADM

## 2021-08-03 RX ORDER — NICOTINE 21 MG/24HR
1 PATCH, TRANSDERMAL 24 HOURS TRANSDERMAL DAILY
Status: DISCONTINUED | OUTPATIENT
Start: 2021-08-03 | End: 2021-08-06 | Stop reason: HOSPADM

## 2021-08-03 RX ORDER — LOSARTAN POTASSIUM 50 MG/1
100 TABLET ORAL DAILY
Status: DISCONTINUED | OUTPATIENT
Start: 2021-08-03 | End: 2021-08-04

## 2021-08-03 RX ORDER — ONDANSETRON 2 MG/ML
4 INJECTION INTRAMUSCULAR; INTRAVENOUS EVERY 6 HOURS PRN
Status: DISCONTINUED | OUTPATIENT
Start: 2021-08-03 | End: 2021-08-06 | Stop reason: HOSPADM

## 2021-08-03 RX ORDER — HYDRALAZINE HYDROCHLORIDE 100 MG/1
100 TABLET, FILM COATED ORAL EVERY 12 HOURS SCHEDULED
Status: DISCONTINUED | OUTPATIENT
Start: 2021-08-03 | End: 2021-08-06 | Stop reason: HOSPADM

## 2021-08-03 RX ORDER — ASPIRIN 81 MG/1
81 TABLET ORAL DAILY
Status: DISCONTINUED | OUTPATIENT
Start: 2021-08-03 | End: 2021-08-06 | Stop reason: HOSPADM

## 2021-08-03 RX ORDER — LOSARTAN POTASSIUM 50 MG/1
50 TABLET ORAL DAILY
Status: DISCONTINUED | OUTPATIENT
Start: 2021-08-03 | End: 2021-08-03

## 2021-08-03 RX ORDER — IPRATROPIUM BROMIDE AND ALBUTEROL SULFATE 2.5; .5 MG/3ML; MG/3ML
1 SOLUTION RESPIRATORY (INHALATION)
Status: DISCONTINUED | OUTPATIENT
Start: 2021-08-03 | End: 2021-08-06

## 2021-08-03 RX ORDER — PANTOPRAZOLE SODIUM 40 MG/1
40 TABLET, DELAYED RELEASE ORAL
Status: DISCONTINUED | OUTPATIENT
Start: 2021-08-03 | End: 2021-08-06 | Stop reason: HOSPADM

## 2021-08-03 RX ORDER — KETOROLAC TROMETHAMINE 15 MG/ML
15 INJECTION, SOLUTION INTRAMUSCULAR; INTRAVENOUS ONCE
Status: COMPLETED | OUTPATIENT
Start: 2021-08-03 | End: 2021-08-03

## 2021-08-03 RX ORDER — POLYETHYLENE GLYCOL 3350 17 G/17G
17 POWDER, FOR SOLUTION ORAL DAILY PRN
Status: DISCONTINUED | OUTPATIENT
Start: 2021-08-03 | End: 2021-08-06 | Stop reason: HOSPADM

## 2021-08-03 RX ORDER — IPRATROPIUM BROMIDE AND ALBUTEROL SULFATE 2.5; .5 MG/3ML; MG/3ML
1 SOLUTION RESPIRATORY (INHALATION) EVERY 4 HOURS PRN
Status: DISCONTINUED | OUTPATIENT
Start: 2021-08-03 | End: 2021-08-06 | Stop reason: HOSPADM

## 2021-08-03 RX ADMIN — GUAIFENESIN 600 MG: 600 TABLET ORAL at 19:59

## 2021-08-03 RX ADMIN — HYDRALAZINE HYDROCHLORIDE 100 MG: 100 TABLET, FILM COATED ORAL at 19:59

## 2021-08-03 RX ADMIN — HYDRALAZINE HYDROCHLORIDE 100 MG: 100 TABLET, FILM COATED ORAL at 09:36

## 2021-08-03 RX ADMIN — Medication 8 ML: at 10:35

## 2021-08-03 RX ADMIN — Medication 10 ML: at 20:00

## 2021-08-03 RX ADMIN — ENOXAPARIN SODIUM 40 MG: 40 INJECTION SUBCUTANEOUS at 08:33

## 2021-08-03 RX ADMIN — IPRATROPIUM BROMIDE AND ALBUTEROL SULFATE 1 AMPULE: .5; 3 SOLUTION RESPIRATORY (INHALATION) at 15:48

## 2021-08-03 RX ADMIN — IOPAMIDOL 100 ML: 755 INJECTION, SOLUTION INTRAVENOUS at 03:50

## 2021-08-03 RX ADMIN — GUAIFENESIN 600 MG: 600 TABLET ORAL at 12:39

## 2021-08-03 RX ADMIN — PANTOPRAZOLE SODIUM 40 MG: 40 TABLET, DELAYED RELEASE ORAL at 12:39

## 2021-08-03 RX ADMIN — ISOSORBIDE MONONITRATE 30 MG: 30 TABLET, EXTENDED RELEASE ORAL at 09:36

## 2021-08-03 RX ADMIN — LOSARTAN POTASSIUM 100 MG: 50 TABLET, FILM COATED ORAL at 08:33

## 2021-08-03 RX ADMIN — ACETAMINOPHEN 1000 MG: 500 TABLET ORAL at 03:15

## 2021-08-03 RX ADMIN — SODIUM CHLORIDE 1000 ML: 9 INJECTION, SOLUTION INTRAVENOUS at 03:15

## 2021-08-03 RX ADMIN — PREDNISONE 40 MG: 10 TABLET ORAL at 12:39

## 2021-08-03 RX ADMIN — ASPIRIN 81 MG: 81 TABLET, COATED ORAL at 09:36

## 2021-08-03 RX ADMIN — IPRATROPIUM BROMIDE AND ALBUTEROL SULFATE 1 AMPULE: .5; 3 SOLUTION RESPIRATORY (INHALATION) at 21:00

## 2021-08-03 RX ADMIN — KETOROLAC TROMETHAMINE 15 MG: 15 INJECTION, SOLUTION INTRAMUSCULAR; INTRAVENOUS at 03:15

## 2021-08-03 ASSESSMENT — ENCOUNTER SYMPTOMS
CONSTIPATION: 0
SORE THROAT: 0
EYE PAIN: 0
CHEST TIGHTNESS: 1
VOMITING: 0
EYES NEGATIVE: 1
SHORTNESS OF BREATH: 1
NAUSEA: 0
DIARRHEA: 0
BLOOD IN STOOL: 0
TROUBLE SWALLOWING: 0
RHINORRHEA: 0
GASTROINTESTINAL NEGATIVE: 1
WHEEZING: 0
BACK PAIN: 0
CHEST TIGHTNESS: 0
STRIDOR: 0
ABDOMINAL PAIN: 0
COUGH: 1

## 2021-08-03 ASSESSMENT — PAIN DESCRIPTION - DESCRIPTORS: DESCRIPTORS: PRESSURE

## 2021-08-03 ASSESSMENT — PAIN SCALES - GENERAL
PAINLEVEL_OUTOF10: 0
PAINLEVEL_OUTOF10: 0
PAINLEVEL_OUTOF10: 6
PAINLEVEL_OUTOF10: 0
PAINLEVEL_OUTOF10: 0
PAINLEVEL_OUTOF10: 5
PAINLEVEL_OUTOF10: 3

## 2021-08-03 ASSESSMENT — PAIN DESCRIPTION - PAIN TYPE
TYPE: ACUTE PAIN
TYPE: ACUTE PAIN

## 2021-08-03 ASSESSMENT — PAIN DESCRIPTION - LOCATION: LOCATION: CHEST

## 2021-08-03 ASSESSMENT — PAIN DESCRIPTION - FREQUENCY: FREQUENCY: CONTINUOUS

## 2021-08-03 ASSESSMENT — PAIN DESCRIPTION - ORIENTATION: ORIENTATION: MID

## 2021-08-03 NOTE — ED NOTES
Pt states that he is not suicidal but while talking with nurse stated x2 that he wishes someday \"I would just die\"pt was asked if he wanted to hurt himself pt stated \"no\". Pt was asked if he wanted to speak with someone about him feeling low and he states yes.       Yulisa Goodson RN  08/03/21 1280

## 2021-08-03 NOTE — PROGRESS NOTES
Physician Progress Note    8/3/2021   2:18 PM    Name:  Lissy Johnson  MRN:    [id number]     IP Day: 0     Admit Date: 8/3/2021  2:16 AM  PCP: Taya Yepez MD    Code Status:  Full Code      Assessment and Plan: Active Problems/ diagnosis:     Chest pain-rule out ACS  Possible pericardial effusion  mild COPD exacerbation  Tobacco use  ? GERD    Plan  Rule out ACS, appreciate cardiology input  Echo pending, follow-up to rule out pericardial effusion  Start the patient on prednisone, inhaled treatment and Mucinex. Start PPIs  Educated on tobacco cessation  Nicotine patch. Home medication resumed    DVT PPx    Dispo-anticipate discharge home tomorrow. Subjective: Has mild chest pain started yesterday rest, woke him up from sleep. History of GERD.     Physical Examination:      Vitals:  BP (!) 152/89   Pulse 71   Temp 98.1 °F (36.7 °C)   Resp 15   Ht 5' 8\" (1.727 m)   Wt 218 lb (98.9 kg)   SpO2 98%   BMI 33.15 kg/m²   Temp (24hrs), Av.1 °F (36.7 °C), Min:97.9 °F (36.6 °C), Max:98.5 °F (36.9 °C)      General appearance: alert, cooperative and no distress  Mental Status: oriented to person, place and time and normal affect  Lungs: clear to auscultation bilaterally, normal effort  Heart: regular rate and rhythm, no murmur  Abdomen: soft, nontender, nondistended, bowel sounds present, no masses  Extremities: no edema, redness, tenderness in the calves  Skin: no gross lesions, rashes    Data:     Labs:  Recent Labs     21  0245   WBC 8.8   HGB 14.9        Recent Labs     21  0245 21  0321     --    K 3.9  --      --    CO2 23  --    BUN 11  --    CREATININE 0.75 0.9   GLUCOSE 96  --      Recent Labs     21  0245   AST 26   ALT 24   BILITOT 0.3   ALKPHOS 94       Current Facility-Administered Medications   Medication Dose Route Frequency Provider Last Rate Last Admin    sodium chloride flush 0.9 % injection 5-40 mL  5-40 mL Intravenous 2 times per day Rosa Basil, APRN - CNP   8 mL at 08/03/21 1035    sodium chloride flush 0.9 % injection 5-40 mL  5-40 mL Intravenous PRN Rosa Basil, APRN - CNP        0.9 % sodium chloride infusion  25 mL Intravenous PRN Rosa Basil, APRN - CNP        enoxaparin (LOVENOX) injection 40 mg  40 mg Subcutaneous Daily Rosa Basil, APRN - CNP   40 mg at 08/03/21 8435    ondansetron (ZOFRAN-ODT) disintegrating tablet 4 mg  4 mg Oral Q8H PRN Rosa Basil, APRN - CNP        Or    ondansetron [facility]) injection 4 mg  4 mg Intravenous Q6H PRN Rosa Basil, APRN - CNP        polyethylene glycol (GLYCOLAX) packet 17 g  17 g Oral Daily PRN Rosa Basil, APRN - CNP        acetaminophen (TYLENOL) tablet 650 mg  650 mg Oral Q6H PRN Rosa Basil, APRN - CNP        Or    acetaminophen (TYLENOL) suppository 650 mg  650 mg Rectal Q6H PRN Rosa Basil, APRN - CNP        ipratropium-albuterol (DUONEB) nebulizer solution 1 ampule  1 ampule Inhalation Q4H PRN Rosa Basil, APRN - CNP        hydrALAZINE (APRESOLINE) injection 10 mg  10 mg Intravenous Q4H PRN Sushil Mariscal Sedar, DO        losartan (COZAAR) tablet 100 mg  100 mg Oral Daily Parish Guadalupe DO   100 mg at 08/03/21 4804    hydrALAZINE (APRESOLINE) tablet 100 mg  100 mg Oral 2 times per day Heber Sheridan MD   100 mg at 08/03/21 0936    aspirin EC tablet 81 mg  81 mg Oral Daily Heber Sheridan MD   81 mg at 08/03/21 3677    isosorbide mononitrate (IMDUR) extended release tablet 30 mg  30 mg Oral Daily Heber Sheridan MD   30 mg at 08/03/21 0936    nicotine (NICODERM CQ) 21 MG/24HR 1 patch  1 patch Transdermal Daily Tay Israel DO   1 patch at 08/03/21 1239    ipratropium-albuterol (DUONEB) nebulizer solution 1 ampule  1 ampule Inhalation Q4H WA Tay Israel DO        predniSONE (DELTASONE) tablet 40 mg  40 mg Oral Daily Tay Israel DO   40 mg at 08/03/21 7190    denisFENesin [facility]) extended release tablet 600 mg  600 mg Oral BID Obed Colon DO   600 mg at 08/03/21 1239    pantoprazole (PROTONIX) tablet 40 mg  40 mg Oral QAM AC Vanessa Queen, DO   40 mg at 08/03/21 1239       Additional work up or/and treatment plan may be added today or then after based on clinical progression. I am managing a portion of pt care. Some medical issues are handled by other specialists. Additional work up and treatment should be done in out pt setting by pt PCP and other out pt providers. In addition to examining and evaluating pt, I spent additional time explaining care, normaland abnormal findings, and treatment plan. All of pt questions were answered. Counseling, diet and education were provided. Case will be discussed with nursing staff when appropriate. Family will be updated if and when appropriate.        Electronically signed by Obed Colon DO on 8/3/2021 at 2:18 PM

## 2021-08-03 NOTE — PROGRESS NOTES
[facility] EMERGENCY [facility] AT [place] Respiratory Therapy Evaluation   Current Order:  duoneb q4 prn      Home Regimen: prn      Ordering Physician: Anibal Ganser  Re-evaluation Date:  eval done     Diagnosis: chest pain      Patient Status: Stable / Unstable + Physician notified    The following MDI Criteria must be met in order to convert aerosol to MDI with spacer.  If unable to meet, MDI will be converted to aerosol:  []  Patient able to demonstrate the ability to use MDI effectively  []  Patient alert and cooperative  []  Patient able to take deep breath with 5-10 second hold  []  Medication(s) available in this delivery method   []  Peak flow greater than or equal to 200 ml/min            Current Order Substituted To  (same drug, same frequency)   Aerosol to MDI [] Albuterol Sulfate 0.083% unit dose by aerosol Albuterol Sulfate MDI 2 puffs by inhalation with spacer    [] Levalbuterol 1.25 mg unit dose by aerosol Levalbuterol MDI 2 puffs by inhalation with spacer    [] Levalbuterol 0.63 mg unit dose by aerosol Levalbuterol MDI 2 puffs by inhalation with spacer    [] Ipratropium Bromide 0.02% unit dose by aerosol Ipratropium Bromide MDI 2 puffs by inhalation with spacer    [] Duoneb (Ipratropium + Albuterol) unit dose by aerosol Ipratropium MDI + Albuterol MDI 2 puffs by inhalation w/spacer   MDI to Aerosol [] Albuterol Sulfate MDI Albuterol Sulfate 0.083% unit dose by aerosol    [] Levalbuterol MDI 2 puffs by inhalation Levalbuterol 1.25 mg unit dose by aerosol    [] Ipratropium Bromide MDI by inhalation Ipratropium Bromide 0.02% unit dose by aerosol    [] Combivent (Ipratropium + Albuterol) MDI by inhalation Duoneb (Ipratropium + Albuterol) unit dose by aerosol       Treatment Assessment [Frequency/Schedule]:  Change frequency to: __________no change________________________________________per Protocol, P&T, MEC      Points 0 1 2 3 4   Pulmonary Status  Non-Smoker  []   Smoking history   < 20 pack years  []   Smoking history  ?  20 pack years  []   Pulmonary Disorder  (acute or chronic)  [x]   Severe or Chronic w/ Exacerbation  []     Surgical Status No [x]   Surgeries     General []   Surgery Lower []   Abdominal Thoracic or []   Upper Abdominal Thoracic with  PulmonaryDisorder  []     Chest X-ray Clear/Not  Ordered     [x]  Chronic Changes  Results Pending  []  Infiltrates, atelectasis, pleural effusion, or edema  []  Infiltrates in more than one lobe []  Infiltrate + Atelectasis, &/or pleural effusion  []    Respiratory Pattern Regular,  RR = 12-20 [x]  Increased,  RR = 21-25 []  [place], irregular,  or RR = 26-30 []  Decreased FEV1  or RR = 31-35 []  Severe SOB, use  of accessory muscles, or RR ? 35  []    Mental Status Alert, oriented,  Cooperative [x]  Confused but Follows commands []  Lethargic or unable to follow commands []  Obtunded  []  Comatose  []    Breath Sounds Clear to  auscultation  [x]  Decreased unilaterally or  in bases only []  Decreased  bilaterally  []  Crackles or intermittent wheezes []  Wheezes []    Cough Strong, Spontan., & nonproductive [x]  Strong,  spontaneous, &  productive []  Weak,  Nonproductive []  Weak, productive or  with wheezes []  No spontaneous  cough or may require suctioning []    Level of Activity Ambulatory [x]  Ambulatory w/ Assist  []  Non-ambulatory []  Paraplegic []  Quadriplegic []    Total    Score:___3____     Triage Score:____5____      Tri       Triage:     1. (>20) Freq: Q3    2. (16-20) Freq: Q4   3. (11-15) Freq: QID & Albuterol Q2 PRN    4. (6-10) Freq: TID & Albuterol Q2 PRN    5. (0-5) Freq Q4prn

## 2021-08-03 NOTE — FLOWSHEET NOTE
0930      Am nursing  assessment completed. Pt :    Handcuffs removed per police           Alert and oriented. pleasent and cooperative   Breakfast: completed  RESP:       Even and non labored at rest         Lung sounds:     Diminished, exp wheeze                            Oxygen: 2l NC  Complaints of: none, noted frequent cough  Pain: denies  IV: SL  TELE: NSR  Dressings:   Precautions:              Falls:        Dimitris:   Chart and meds reviewed. Noted Labs:   Plan for today: rounds completed by Dr Glenwood Hashimoto. Pt for echo today. Electronically signed by Era Kirk RN on 8/3/2021 at 10:55 AM    1115 bedside echo in progress.  No complaints

## 2021-08-03 NOTE — H&P
[place] [address] MEDICINE    HISTORY AND PHYSICAL EXAM    PATIENT NAME:  Clarence Benitez    MRN:  [id number]  SERVICE DATE:  8/3/2021   SERVICE TIME:  5:30 AM    Primary Care Physician: Margarette Rodriguez MD     SUBJECTIVE  CHIEF COMPLAINT:  Chest pain, SOB    HPI:  Clarence Benitez is a 54 y.o., , male who  has a past medical history of COPD (chronic obstructive pulmonary disease) ([facility] [address] Exertional dyspnea, Other chest pain, Personal history of DVT (deep vein thrombosis), Personal history of pulmonary embolism, and Smoking. that is hospitalized for abnormal CT findings. HPI  Presents w complaint of SOB and chest pain/tightness. He has known COPD, long time smoker he states, has dry cough and SOB that \"comes and goes\" and is not new to him. He has nebulizer at home but does not use. He also has 4 hour history of chest tightness/pain,  Substernal, does not radiate, Denies diaphoresis, N/V and arm pain. No aggravating or alleviating factors identified. EKG showed NSR,   ms, possible LVH, T wave abnormality. He had CT that showed no PE, Curvilinear structure along the left posterior heart border. He is admitted for further eval/treatment. PAST MEDICAL HISTORY:    Past Medical History:   Diagnosis Date    [facility]     Exertional dyspnea 3/9/2012    Other chest pain     Personal history of DVT (deep vein thrombosis) 3/9/2012    Personal history of pulmonary embolism 3/9/2012    Smoking 3/9/2012     PAST SURGICAL HISTORY:    Past Surgical History:   Procedure Laterality Date    HAND SURGERY      right hand     FAMILY HISTORY:  History reviewed. No pertinent family history.   SOCIAL HISTORY:    Social History     Socioeconomic History    Marital status:      Spouse name: Not on file    Number of children: Not on file    Years of education: Not on file    Highest education level: Not on file   Occupational History    Not on file   Tobacco Use    Smoking status: Former Smoker     Quit date: 1/10/2019     Years since quittin.5    Smokeless tobacco: Former User     Quit date: 1988   Substance and Sexual Activity    Alcohol use: Yes     Comment: socially    Drug use: No    Sexual activity: Yes   Other Topics Concern    Not on file   Social History Narrative    Not on file     Social Determinants of Health     Financial Resource Strain:     Difficulty of Paying Living Expenses:    Food Insecurity:     Worried About Running Out of Food in the Last Year:     [address] in the Last Year:    Transportation Needs:     Lack of Transportation (Medical):  Lack of Transportation (Non-Medical):    Physical Activity:     Days of Exercise per Week:     Minutes of Exercise per Session:    Stress:     Feeling of Stress :    Social Connections:     Frequency of Communication with Friends and Family:     Frequency of Social Gatherings with Friends and Family:     Attends [employer] Services:     Active Member of Clubs or Organizations:     Attends Club or Organization Meetings:     Marital Status:    Intimate Partner Violence:     Fear of Current or Ex-Partner:     Emotionally Abused:     Physically Abused:     Sexually Abused:      MEDICATIONS:   Prior to Admission medications    Medication Sig Start Date End Date Taking?  Authorizing Provider   EPINEPHrine (EPIPEN) 0.3 MG/0.3ML SOAJ injection Inject 0.3 mLs into the muscle once for 1 dose 1/21/19 8/3/21 Yes Lyndsey Langford MD   amLODIPine (NORVASC) 5 MG tablet TAKE 1 TABLET BY MOUTH DAILY 19   Lyndsey Langford MD   losartan (COZAAR) 100 MG tablet TAKE 1 TABLET BY MOUTH DAILY 19   Lyndsey Langford MD   traZODone (DESYREL) 150 MG tablet Take 1 tablet by mouth nightly as needed for Sleep 19   Lyndsey Langford MD   ibuprofen (ADVIL;MOTRIN) 800 MG tablet Take 1 tablet by mouth 2 times daily as needed for Pain 19   Lyndsey Langford MD   albuterol sulfate HFA (VENTOLIN HFA) 108 (90 Base) MCG/ACT inhaler Inhale 2 puffs into the lungs every 6 hours as needed for Wheezing 1/21/19   Jennifer Lugo MD       ALLERGIES: Bee venom, Lisinopril, and Codeine    REVIEW OF SYSTEM:   Review of Systems   Constitutional: Negative for chills, diaphoresis, fatigue and fever. HENT: Negative for sore throat and trouble swallowing. Eyes: Negative. Respiratory: Positive for cough, chest tightness and shortness of breath. Negative for wheezing. Cardiovascular: Positive for chest pain. Negative for palpitations and leg swelling. Gastrointestinal: Negative for abdominal pain, constipation, diarrhea, nausea and vomiting. Endocrine: Negative. Genitourinary: Negative for dysuria, flank pain and urgency. Musculoskeletal: Negative. Skin: Negative. Neurological: Negative for dizziness, syncope, speech difficulty, weakness, numbness and headaches. Psychiatric/Behavioral: Negative. OBJECTIVE  PHYSICAL EXAM:   Physical Exam  Vitals and nursing note reviewed. Constitutional:       Appearance: Normal appearance. He is normal weight. HENT:      Head: Normocephalic and atraumatic. Nose: Nose normal.      Mouth/Throat:      Mouth: Mucous membranes are moist.      Pharynx: Oropharynx is clear. Eyes:      Conjunctiva/sclera: Conjunctivae normal.   Cardiovascular:      Rate and Rhythm: Normal rate and regular rhythm. Pulses: Normal pulses. Heart sounds: Normal heart sounds. Pulmonary:      Effort: Pulmonary effort is normal. No respiratory distress. Breath sounds: Wheezing (mild expiratory) present. No rhonchi. Comments: Diminished bases  Abdominal:      General: Bowel sounds are normal.      Palpations: Abdomen is soft. Musculoskeletal:         General: Normal range of motion. Cervical back: Normal range of motion and neck supple. Skin:     General: Skin is warm and dry.       Capillary Refill: Capillary refill takes less than 2 seconds. Neurological:      Mental Status: He is alert and oriented to person, place, and time. Psychiatric:         Mood and Affect: Mood normal.         Behavior: Behavior normal.          /73   Pulse 73   Temp 98.5 °F (36.9 °C) (Oral)   Resp 17   Ht 5' 8\" (1.727 m)   Wt 215 lb (97.5 kg)   SpO2 97%   BMI 32.69 kg/m²     DATA:     Diagnostic tests reviewed for today's visit:    Most recent labs and imaging results reviewed.      LABS:    Recent Results (from the past 24 hour(s))   EKG 12 Lead    Collection Time: 08/03/21  2:28 AM   Result Value Ref Range    Ventricular Rate 78 BPM    Atrial Rate 78 BPM    P-R Interval 150 ms    QRS Duration 96 ms    Q-T Interval 404 ms    QTc Calculation (Bazett) 460 ms    P Axis 63 degrees    R Axis -33 degrees    T Axis 129 degrees   Comprehensive Metabolic Panel    Collection Time: 08/03/21  2:45 AM   Result Value Ref Range    Sodium 142 135 - 144 mEq/L    Potassium 3.9 3.4 - 4.9 mEq/L    Chloride 102 95 - 107 mEq/L    CO2 23 20 - 31 mEq/L    Anion Gap 17 (H) 9 - 15 mEq/L    Glucose 96 70 - 99 mg/dL    BUN 11 6 - 20 mg/dL    CREATININE 0.75 0.70 - 1.20 mg/dL    GFR Non-African American >60.0 >60    GFR  >60.0 >60    Calcium 9.1 8.5 - 9.9 mg/dL    Total Protein 7.2 6.3 - 8.0 g/dL    Albumin 4.7 (H) 3.5 - 4.6 g/dL    Total Bilirubin 0.3 0.2 - 0.7 mg/dL    Alkaline Phosphatase 94 35 - 104 U/L    ALT 24 0 - 41 U/L    AST 26 0 - 40 U/L    Globulin 2.5 2.3 - 3.5 g/dL   CBC Auto Differential    Collection Time: 08/03/21  2:45 AM   Result Value Ref Range    WBC 8.8 4.8 - 10.8 K/uL    RBC 4.73 4.70 - 6.10 M/uL    Hemoglobin 14.9 14.0 - 18.0 g/dL    Hematocrit 43.8 42.0 - 52.0 %    MCV 92.6 80.0 - 100.0 fL    MCH 31.4 (H) 27.0 - 31.3 pg    MCHC 33.9 33.0 - 37.0 %    RDW 14.0 11.5 - 14.5 %    Platelets 050 506 - 524 K/uL    Neutrophils % 65.5 %    Lymphocytes % 27.6 %    Monocytes % 5.6 %    Eosinophils % 0.7 %    Basophils % 0.6 %    Neutrophils Absolute 5.8 1.4 - 6.5 K/uL    Lymphocytes Absolute 2.4 1.0 - 4.8 K/uL    Monocytes Absolute 0.5 0.2 - 0.8 K/uL    Eosinophils Absolute 0.1 0.0 - 0.7 K/uL    Basophils Absolute 0.1 0.0 - 0.2 K/uL   Lipase    Collection Time: 08/03/21  2:45 AM   Result Value Ref Range    Lipase 23 12 - 95 U/L   Magnesium    Collection Time: 08/03/21  2:45 AM   Result Value Ref Range    Magnesium 2.1 1.7 - 2.4 mg/dL   Troponin    Collection Time: 08/03/21  2:45 AM   Result Value Ref Range    Troponin <0.010 0.000 - 0.010 ng/mL   POCT CREATININE    Collection Time: 08/03/21  3:20 AM   Result Value Ref Range    POC CREATININE WHOLE BLOOD 0.9        IMAGING:  No results found. VTE Prophylaxis: low molecular weight heparin -  start    ASSESSMENT AND PLAN  Principal Problem:    Chest pain  Described as tightness and pain,  EKG OK  Troponin 0.010  Reproducible. He feels d/t his COPD. No PE,    Plan: duoneb prn. Smoking cessation counseling. Serial troponin,  EKG in AM  Active Problems:    Abnormal CT scan, chest    SEE CT RESULTS. 4x6x2.3 cm curvilinear structure along left posterior heart border. Plan: cardiology consult  ECHO   Possible loculated pericardial effusion. COPD diminished breath sounds. Occasional expiratory wheeze. Non productive cough    Has nebulizer at home   Does not use. Plan: duo neb  q 4 hr prn     HTN   166/96   No HA, blurred vision, CP, edema or dizziness   Normal heart sounds  Good pulses. Was prescribed losartan at some point. Non compliant  Plan: Losartan 50 mg daily        HLD  States was on cholesterol medication  No hx stroke, DM or MI.    Plan: lipid profile,   Statin if indicated        Plan of care discussed with: patient    SIGNATURE: RODERICK Silva - CNP  DATE: August 3, 2021  TIME: 5:30 AM   Olya Bright DO  - supervising physician

## 2021-08-03 NOTE — CONSULTS
Consults    Patient Name: Jakob Andres  Admit Date: 8/3/2021  2:16 AM  MR #: [id number]  : 1970    Attending Physician: Francia Chapman DO  Reason for consult: CP    History of Presenting Illness:      Jakob Andres is a 54 y.o. male on hospital day 0 with a history of . History Obtained From:  patient, electronic medical record    Pt is currently incarcerated for Domestic Violence charge. Pt is restrained by YADY Kat and under secure observation. Presented with several days to weeks of CP midsternal area. Pt has been having night sweats and fever at home. CT chest shows mass. Work up is in progress. He has long h/o HTN and has been noncompliant. He has extensive Drug addiction and just completed Rehab. States he has been clean for few months. He had been doing Meth and Crack Cocaine. He is a continued smoker. ECG shows inferlateral T wave inversion. Initial Trop negative  History:      EKG: SR  Past Medical History:   Diagnosis Date    COPD (chronic obstructive pulmonary disease) ([address]     Exertional dyspnea 3/9/2012    Other chest pain     Personal history of DVT (deep vein thrombosis) 3/9/2012    Personal history of pulmonary embolism 3/9/2012    Smoking 3/9/2012     Past Surgical History:   Procedure Laterality Date    HAND SURGERY      right hand       Family History  History reviewed. No pertinent family history.   [] Unable to obtain due to ventilated and/ or neurologic status    Social History     Socioeconomic History    Marital status:      Spouse name: Not on file    Number of children: Not on file    Years of education: Not on file    Highest education level: Not on file   Occupational History    Not on file   Tobacco Use    Smoking status: Current Every Day Smoker     Packs/day: 1.00     Years: 30.00     Pack years: 30.00     Last attempt to quit: 1/10/2019     Years since quittin.5    Smokeless tobacco: Former User     Quit date: 1/1/1988   Substance and Sexual Activity    Alcohol use: Yes     Comment: socially    Drug use: No    Sexual activity: Yes   Other Topics Concern    Not on file   Social History Narrative    Not on file     Social Determinants of Health     Financial Resource Strain:     Difficulty of Paying Living Expenses:    Food Insecurity:     Worried About Running Out of Food in the Last Year:     [address] in the Last Year:    Transportation Needs:     Lack of Transportation (Medical):      Lack of Transportation (Non-Medical):    Physical Activity:     Days of Exercise per Week:     Minutes of Exercise per Session:    Stress:     Feeling of Stress :    Social Connections:     Frequency of Communication with Friends and Family:     Frequency of Social Gatherings with Friends and Family:     Attends [employer] Services:     Active Member of Clubs or Organizations:     Attends Club or Organization Meetings:     Marital Status:    Intimate Partner Violence:     Fear of Current or Ex-Partner:     Emotionally Abused:     Physically Abused:     Sexually Abused:       [] Unable to obtain due to ventilated and/ or neurologic status      Home Medications:      Medications Prior to Admission: EPINEPHrine (EPIPEN) 0.3 MG/0.3ML SOAJ injection, Inject 0.3 mLs into the muscle once for 1 dose  traZODone (DESYREL) 150 MG tablet, Take 1 tablet by mouth nightly as needed for Sleep  albuterol sulfate HFA (VENTOLIN HFA) 108 (90 Base) MCG/ACT inhaler, Inhale 2 puffs into the lungs every 6 hours as needed for Wheezing  amLODIPine (NORVASC) 5 MG tablet, TAKE 1 TABLET BY MOUTH DAILY  losartan (COZAAR) 100 MG tablet, TAKE 1 TABLET BY MOUTH DAILY  ibuprofen (ADVIL;MOTRIN) 800 MG tablet, Take 1 tablet by mouth 2 times daily as needed for Pain    Current Hospital Medications:     Scheduled Meds:   sodium chloride flush  5-40 mL Intravenous 2 times per day    enoxaparin  40 mg Subcutaneous Daily    losartan  100 mg Oral motion and neck supple. Neurological: He is alert and oriented to person, place, and time. Skin: Skin is warm. No cyanosis. Nails show no clubbing. Results/ Medications reviewed 8/3/2021, 8:26 AM     Laboratory, Microbiology, Pathology, Radiology, Cardiology, Medications and Transcriptions reviewed  Scheduled Meds:   sodium chloride flush  5-40 mL Intravenous 2 times per day    enoxaparin  40 mg Subcutaneous Daily    losartan  100 mg Oral Daily     Continuous Infusions:   sodium chloride         Recent Labs     08/03/21  0245   WBC 8.8   HGB 14.9   HCT 43.8   MCV 92.6        Recent Labs     08/03/21 0245      K 3.9      CO2 23   BUN 11   CREATININE 0.75     Recent Labs     08/03/21 0245   AST 26   ALT 24   BILITOT 0.3   ALKPHOS 94     Recent Labs     08/03/21  0245   LIPASE 23     Recent Labs     08/03/21 0245   PROT 7.2     No results found. Active Hospital Problems    Diagnosis Date Noted    Chest pain [R07.9] 08/03/2021     Priority: Low    HTN (hypertension) [I10] 08/03/2021     Priority: Low    HLD (hyperlipidemia) [E78.5] 08/03/2021     Priority: Low    Abnormal CT scan, chest [R93.89] 08/03/2021     Priority: Low    COPD (chronic obstructive pulmonary disease) ([facility] [J44.9]      Priority: Low         Impression/Plan:   1. CP- assess for ACS. serial Troponin. Add Nitrate. 2. Abnormal CT- will review Echo to assess for pericardial involvement  3. Pulmonary to evaluate. 4. Drug abuser  5. HTN uncontrolled- add Hydralazine. Thank you for allowing us to participate in the care of this patient. Will continue to follow. Please call if questions or concerns arise.     Electronically signed by Franco Green MD on 8/3/2021 at 8:26 AM

## 2021-08-03 NOTE — ED PROVIDER NOTES
[facility]  eMERGENCY dEPARTMENT eNCOUnter      Pt Name: Darren Baldwin  MRN: [id number]  [place] 1970  Date of evaluation: 8/3/2021  Provider: Carmen Fernandez MD        HISTORY OF PRESENT ILLNESS    Darren Baldwin is a 54 y.o. male per chart review has a PMH of DVT/PE, COPD and tobacco smoking presenting to the ED via EMS status post being arrested by Police c/o SOB, chest pain, chest tightness, cough, fatigue and left lower extremity swelling/pain. Patient states he has had chronic SOB that he attributes to his COPD. States it was worse this morning however. Does not take his inhalers or any other medications regularly. States SOP has been fairly constant, unchanged. Also states that he has noticed that his left calf is more swollen and painful as of late. Believes this is similar to previous DVT. Unsure if SOB is similar to previous episode of PE. States chest pain started tonight, approximately 3 to 4 hours ago. Denies any inciting events. No trauma. Characterizes pain as tightness, sternal, nonradiating and constant. Denies any associated headache, hemoptysis, sore throat, abdominal pain, N/V/D/C or urinary symptoms. Also denies any back pain. Is not on any anticoagulation. Still currently smokes cigarettes. Denies any illicit substance abuse. Denies drinking alcohol today. REVIEW OF SYSTEMS       Review of Systems   Constitutional: Positive for fatigue. Negative for chills and fever. HENT: Negative for rhinorrhea and sore throat. Eyes: Negative for pain and visual disturbance. Respiratory: Positive for cough, chest tightness and shortness of breath. Cardiovascular: Positive for chest pain and leg swelling. Negative for palpitations. Gastrointestinal: Negative for abdominal pain, diarrhea, nausea and vomiting. Genitourinary: Negative for dysuria. Musculoskeletal: Positive for myalgias. Negative for back pain and neck pain. Skin: Negative for rash. Marital status:      Spouse name: None    Number of children: None    Years of education: None    Highest education level: None   Occupational History    None   Tobacco Use    Smoking status: Current Every Day Smoker     Packs/day: 1.00     Years: 30.00     Pack years: 30.00     Last attempt to quit: 1/10/2019     Years since quittin.5    Smokeless tobacco: Former User     Quit date: 1988   Substance and Sexual Activity    Alcohol use: Yes     Comment: socially    Drug use: No    Sexual activity: Yes   Other Topics Concern    None   Social History Narrative    None     Social Determinants of Health     Financial Resource Strain:     Difficulty of Paying Living Expenses:    Food Insecurity:     Worried About Running Out of Food in the Last Year:     [address] in the Last Year:    Transportation Needs:     Lack of Transportation (Medical):  Lack of Transportation (Non-Medical):    Physical Activity:     Days of Exercise per Week:     Minutes of Exercise per Session:    Stress:     Feeling of Stress :    Social Connections:     Frequency of Communication with Friends and Family:     Frequency of Social Gatherings with Friends and Family:     Attends Yarsani Services:     Active Member of Clubs or Organizations:     Attends Club or Organization Meetings:     Marital Status:    Intimate Partner Violence:     Fear of Current or Ex-Partner:     Emotionally Abused:     Physically Abused:     Sexually Abused:          PHYSICAL EXAM       ED Triage Vitals [21 0216]   BP Temp Temp Source Pulse Resp SpO2 Height Weight   116/78 98.5 °F (36.9 °C) Oral 83 20 96 % 5' 8\" (1.727 m) 215 lb (97.5 kg)       Physical Exam  Vitals and nursing note reviewed. Constitutional:       General: He is not in acute distress. Appearance: He is well-developed and normal weight. He is not ill-appearing. HENT:      Head: Normocephalic and atraumatic.       Mouth/Throat:      Mouth: Mucous membranes are dry. Pharynx: Oropharynx is clear. Eyes:      Extraocular Movements: Extraocular movements intact. Pupils: Pupils are equal, round, and reactive to light. Cardiovascular:      Rate and Rhythm: Normal rate and regular rhythm. Pulses: Normal pulses. Heart sounds: Normal heart sounds. Pulmonary:      Effort: No tachypnea or respiratory distress. Breath sounds: Decreased air movement (Mildly diminished) present. No transmitted upper airway sounds. No wheezing. Chest:      Comments: Mild TTP, reproducible over the distal sternum. No overlying crepitance or deformity. Abdominal:      General: There is no distension. Palpations: Abdomen is soft. Tenderness: There is no abdominal tenderness. There is no right CVA tenderness, left CVA tenderness, guarding or rebound. Musculoskeletal:         General: No swelling, tenderness or deformity. Cervical back: Normal range of motion and neck supple. Right lower leg: No edema. Left lower leg: No edema. Skin:     General: Skin is warm and dry. Capillary Refill: Capillary refill takes less than 2 seconds. Neurological:      General: No focal deficit present. Mental Status: He is alert and oriented to person, place, and time. LABS:  Labs Reviewed   COMPREHENSIVE METABOLIC PANEL - Abnormal; Notable for the following components:       Result Value    Anion Gap 17 (*)     Albumin 4.7 (*)     All other components within normal limits   CBC WITH AUTO DIFFERENTIAL - Abnormal; Notable for the following components:    MCH 31.4 (*)     All other components within normal limits   POCT CREATININE - URINE - Normal   COVID-19, RAPID   CULTURE, BLOOD 1   CULTURE, BLOOD 2   LIPASE   MAGNESIUM   TROPONIN   TROPONIN   TROPONIN   LIPID PANEL       ED Course as of Aug 03 0726   Tue Aug 03, 2021   0253 Sinus rhythm, rate of 78 bpm.  Left axis deviation, borderline prolonged QTC.   T wave inversions in lateral leads. Previous EKG reviewed. EKG 12 Lead [NA]   [id number] Per radiology read: No pulmonary embolism. Curvilinear structure along the left posterior heart border could reflect a loculated pericardial effusion measuring 4 top by 6 x 2.3 cm, consider echocardiogram for confirmation. Equivocal esophagitis versus artifact from incomplete distention. Mild peripheral dependent atelectasis or infiltrate, nonspecific, cannot rule out pneumonia: Findings are atypical for Covid pneumonitis, though this entity is not excluded. CTA Chest W WO  (PE study) [NA]   5881 SARS-CoV-2, NAAT: Not Detected [NA]      ED Course User Index  [NA] Antione Harrison MD         MDM:   Vitals:    Vitals:    08/03/21 0530 08/03/21 0600 08/03/21 0615 08/03/21 0644   BP: (!) 166/96 (!) 143/91  (!) 171/99   Pulse: 77 73 70 71   Resp: 15 16 16 15   Temp:    98.1 °F (36.7 °C)   TempSrc:    Oral   SpO2: 95% 92% 93% 97%   Weight:   218 lb (98.9 kg)    Height:   5' 8\" (1.727 m)        per chart review has a PMH of DVT/PE, COPD and tobacco smoking presenting to the ED via EMS status post being arrested by Police c/o SOB, chest pain, chest tightness, cough, fatigue and left lower extremity swelling/pain. Upon initial evaluation, Pt awake, alert, HDS and satting normally on RA. EKG, labs and imaging as noted above. Given findings, initially thought PE highly likely given patient's history and persistent S OB. Therefore obtained CTA of the chest which did not show any evidence of pulmonary embolism. It did however show abnormal cystic structure abutting the left sided retrocardiac space. After discussion with radiology, thought likely secondary to bronchogenic cyst versus neoplastic cyst.  Could not be characterized further. Lower suspicion for ACS, pericarditis causing the patient's pain. Radiology recommending MRI for further evaluation of the mass.   Patient will therefore be admitted to the internal medicine service with plan for MRI and further evaluation. Dr. Wilder Welch was amenable to accepting care of the patient. Patient understanding and amenable to the plan of care. CRITICAL CARE TIME         PROCEDURES:  Unlessotherwise noted below, none     Procedures      FINAL IMPRESSION      1. Chest pain, unspecified type    2. Shortness of breath    3.  Mediastinal mass          DISPOSITION/PLAN   DISPOSITION Admitted 08/03/2021 05:14:41 AM           Laurel Juares MD  08/03/21 5496

## 2021-08-03 NOTE — ED TRIAGE NOTES
Pt states that he was has been having CP off and on all day. Pt was arrested this evening and states that he became SOB while in the back of the  car. Pt states that his CP also continued and was severe. Pt at this time is a+ox4, no signs of distress.  Pt was given breathing tx via EMS

## 2021-08-04 PROBLEM — J44.1 COPD EXACERBATION (HCC): Status: ACTIVE | Noted: 2021-08-04

## 2021-08-04 LAB
ALBUMIN SERPL-MCNC: 4.1 G/DL (ref 3.5–4.6)
ALP BLD-CCNC: 82 U/L (ref 35–104)
ALT SERPL-CCNC: 18 U/L (ref 0–41)
ANION GAP SERPL CALCULATED.3IONS-SCNC: 15 MEQ/L (ref 9–15)
AST SERPL-CCNC: 19 U/L (ref 0–40)
BASOPHILS ABSOLUTE: 0 K/UL (ref 0–0.2)
BASOPHILS RELATIVE PERCENT: 0.4 %
BILIRUB SERPL-MCNC: <0.2 MG/DL (ref 0.2–0.7)
BUN BLDV-MCNC: 17 MG/DL (ref 6–20)
CALCIUM SERPL-MCNC: 8.8 MG/DL (ref 8.5–9.9)
CHLORIDE BLD-SCNC: 105 MEQ/L (ref 95–107)
CO2: 22 MEQ/L (ref 20–31)
CREAT SERPL-MCNC: 0.91 MG/DL (ref 0.7–1.2)
EKG ATRIAL RATE: 70 BPM
EKG ATRIAL RATE: 78 BPM
EKG P AXIS: 63 DEGREES
EKG P AXIS: 65 DEGREES
EKG P-R INTERVAL: 146 MS
EKG P-R INTERVAL: 150 MS
EKG Q-T INTERVAL: 404 MS
EKG Q-T INTERVAL: 422 MS
EKG QRS DURATION: 96 MS
EKG QRS DURATION: 96 MS
EKG QTC CALCULATION (BAZETT): 455 MS
EKG QTC CALCULATION (BAZETT): 460 MS
EKG R AXIS: -33 DEGREES
EKG R AXIS: 2 DEGREES
EKG T AXIS: 129 DEGREES
EKG T AXIS: 190 DEGREES
EKG VENTRICULAR RATE: 70 BPM
EKG VENTRICULAR RATE: 78 BPM
EOSINOPHILS ABSOLUTE: 0.3 K/UL (ref 0–0.7)
EOSINOPHILS RELATIVE PERCENT: 2.5 %
GFR AFRICAN AMERICAN: >60
GFR NON-AFRICAN AMERICAN: >60
GLOBULIN: 2.2 G/DL (ref 2.3–3.5)
GLUCOSE BLD-MCNC: 132 MG/DL (ref 70–99)
HCT VFR BLD CALC: 41.7 % (ref 42–52)
HEMOGLOBIN: 14 G/DL (ref 14–18)
LYMPHOCYTES ABSOLUTE: 2.4 K/UL (ref 1–4.8)
LYMPHOCYTES RELATIVE PERCENT: 22 %
MCH RBC QN AUTO: 31.1 PG (ref 27–31.3)
MCHC RBC AUTO-ENTMCNC: 33.7 % (ref 33–37)
MCV RBC AUTO: 92.4 FL (ref 80–100)
MONOCYTES ABSOLUTE: 0.8 K/UL (ref 0.2–0.8)
MONOCYTES RELATIVE PERCENT: 7.1 %
NEUTROPHILS ABSOLUTE: 7.4 K/UL (ref 1.4–6.5)
NEUTROPHILS RELATIVE PERCENT: 68 %
PDW BLD-RTO: 14.2 % (ref 11.5–14.5)
PLATELET # BLD: 217 K/UL (ref 130–400)
POTASSIUM REFLEX MAGNESIUM: 3.6 MEQ/L (ref 3.4–4.9)
RBC # BLD: 4.51 M/UL (ref 4.7–6.1)
SODIUM BLD-SCNC: 142 MEQ/L (ref 135–144)
TOTAL PROTEIN: 6.3 G/DL (ref 6.3–8)
WBC # BLD: 10.9 K/UL (ref 4.8–10.8)

## 2021-08-04 PROCEDURE — 6370000000 HC RX 637 (ALT 250 FOR IP): Performed by: INTERNAL MEDICINE

## 2021-08-04 PROCEDURE — 94761 N-INVAS EAR/PLS OXIMETRY MLT: CPT

## 2021-08-04 PROCEDURE — 80053 COMPREHEN METABOLIC PANEL: CPT

## 2021-08-04 PROCEDURE — 2580000003 HC RX 258: Performed by: NURSE PRACTITIONER

## 2021-08-04 PROCEDURE — 2060000000 HC ICU INTERMEDIATE R&B

## 2021-08-04 PROCEDURE — 6360000002 HC RX W HCPCS: Performed by: NURSE PRACTITIONER

## 2021-08-04 PROCEDURE — 96372 THER/PROPH/DIAG INJ SC/IM: CPT

## 2021-08-04 PROCEDURE — 2700000000 HC OXYGEN THERAPY PER DAY

## 2021-08-04 PROCEDURE — 99232 SBSQ HOSP IP/OBS MODERATE 35: CPT | Performed by: INTERNAL MEDICINE

## 2021-08-04 PROCEDURE — 93010 ELECTROCARDIOGRAM REPORT: CPT | Performed by: INTERNAL MEDICINE

## 2021-08-04 PROCEDURE — 99254 IP/OBS CNSLTJ NEW/EST MOD 60: CPT | Performed by: INTERNAL MEDICINE

## 2021-08-04 PROCEDURE — 85025 COMPLETE CBC W/AUTO DIFF WBC: CPT

## 2021-08-04 PROCEDURE — 36415 COLL VENOUS BLD VENIPUNCTURE: CPT

## 2021-08-04 PROCEDURE — 6370000000 HC RX 637 (ALT 250 FOR IP): Performed by: NURSE PRACTITIONER

## 2021-08-04 PROCEDURE — 94640 AIRWAY INHALATION TREATMENT: CPT

## 2021-08-04 RX ORDER — LOSARTAN POTASSIUM 50 MG/1
100 TABLET ORAL 2 TIMES DAILY
Status: DISCONTINUED | OUTPATIENT
Start: 2021-08-04 | End: 2021-08-06 | Stop reason: HOSPADM

## 2021-08-04 RX ADMIN — HYDRALAZINE HYDROCHLORIDE 100 MG: 100 TABLET, FILM COATED ORAL at 19:40

## 2021-08-04 RX ADMIN — LOSARTAN POTASSIUM 100 MG: 50 TABLET, FILM COATED ORAL at 07:52

## 2021-08-04 RX ADMIN — IPRATROPIUM BROMIDE AND ALBUTEROL SULFATE 1 AMPULE: .5; 3 SOLUTION RESPIRATORY (INHALATION) at 07:48

## 2021-08-04 RX ADMIN — ENOXAPARIN SODIUM 40 MG: 40 INJECTION SUBCUTANEOUS at 07:52

## 2021-08-04 RX ADMIN — Medication 10 ML: at 07:53

## 2021-08-04 RX ADMIN — GUAIFENESIN 600 MG: 600 TABLET ORAL at 07:52

## 2021-08-04 RX ADMIN — ACETAMINOPHEN 650 MG: 325 TABLET ORAL at 15:34

## 2021-08-04 RX ADMIN — PREDNISONE 40 MG: 10 TABLET ORAL at 07:52

## 2021-08-04 RX ADMIN — HYDRALAZINE HYDROCHLORIDE 100 MG: 100 TABLET, FILM COATED ORAL at 07:52

## 2021-08-04 RX ADMIN — ASPIRIN 81 MG: 81 TABLET, COATED ORAL at 07:52

## 2021-08-04 RX ADMIN — IPRATROPIUM BROMIDE AND ALBUTEROL SULFATE 1 AMPULE: .5; 3 SOLUTION RESPIRATORY (INHALATION) at 11:33

## 2021-08-04 RX ADMIN — IPRATROPIUM BROMIDE AND ALBUTEROL SULFATE 1 AMPULE: .5; 3 SOLUTION RESPIRATORY (INHALATION) at 16:27

## 2021-08-04 RX ADMIN — IPRATROPIUM BROMIDE AND ALBUTEROL SULFATE 1 AMPULE: .5; 3 SOLUTION RESPIRATORY (INHALATION) at 20:12

## 2021-08-04 RX ADMIN — PANTOPRAZOLE SODIUM 40 MG: 40 TABLET, DELAYED RELEASE ORAL at 06:05

## 2021-08-04 RX ADMIN — GUAIFENESIN 600 MG: 600 TABLET ORAL at 19:39

## 2021-08-04 RX ADMIN — Medication 10 ML: at 21:29

## 2021-08-04 RX ADMIN — ISOSORBIDE MONONITRATE 30 MG: 30 TABLET, EXTENDED RELEASE ORAL at 07:52

## 2021-08-04 RX ADMIN — LOSARTAN POTASSIUM 100 MG: 50 TABLET, FILM COATED ORAL at 19:39

## 2021-08-04 ASSESSMENT — ENCOUNTER SYMPTOMS
WHEEZING: 0
COUGH: 0
CHEST TIGHTNESS: 0
NAUSEA: 0
STRIDOR: 0
ABDOMINAL PAIN: 1
EYES NEGATIVE: 1
BLOOD IN STOOL: 0
SHORTNESS OF BREATH: 0
RESPIRATORY NEGATIVE: 1

## 2021-08-04 ASSESSMENT — PAIN SCALES - GENERAL
PAINLEVEL_OUTOF10: 0
PAINLEVEL_OUTOF10: 0
PAINLEVEL_OUTOF10: 6

## 2021-08-04 NOTE — PROGRESS NOTES
Physician Progress Note    2021   1:42 PM    Name:  Kathleen Rivers  MRN:    [id number]     IP Day: 0     Admit Date: 8/3/2021  2:16 AM  PCP: Ning Marei MD    Code Status:  Full Code      Assessment and Plan: Active Problems/ diagnosis:     Chest pain-rule out ACS  Possible pericardial effusion  mild COPD exacerbation  Tobacco use  ? GERD    Plan  1. ACS ruled out. Appreciate cardiology input  2. Consult pulmonary medicine for COPD exacerbation and to establish care and have an outpatient follow-up with pulmonary medicine. 3. Echo unremarkable, EF 60%, no wall motion abnormalities or significant valvular disease, no evidence of pericardial effusion as per report. 4. Resume the patient on prednisone, inhaled treatment and Mucinex. 5. Resume PPIs  6. Educated on tobacco cessation  7. Nicotine patch. 8. Home medication resumed    DVT PPx    Dispo-anticipate discharge next 24 to 48 hours if his respiratory symptoms are better. Subjective:     Chest pain is mildly improving. However he has cough and wheezing. Physical Examination:      Vitals:  BP (!) 184/97   Pulse 65   Temp 97.5 °F (36.4 °C)   Resp 18   Ht 5' 8\" (1.727 m)   Wt 218 lb (98.9 kg)   SpO2 98%   BMI 33.15 kg/m²   Temp (24hrs), Av.3 °F (36.8 °C), Min:97.5 °F (36.4 °C), Max:98.8 °F (37.1 °C)      General appearance: alert, cooperative and no distress  Mental Status: oriented to person, place and time and normal affect  Lungs: Expiratory wheezing bilaterally. Coughs with deep breathing.   Heart: regular rate and rhythm, no murmur  Abdomen: soft, nontender, nondistended, bowel sounds present, no masses  Extremities: no edema, redness, tenderness in the calves  Skin: no gross lesions, rashes    Data:     Labs:  Recent Labs     21  0245 21  0707   WBC 8.8 10.9*   HGB 14.9 14.0    217     Recent Labs     21  0245 21  0321 21  0707     --  142   K 3.9  --  3.6     --  105   CO2 23 --  22   BUN 11  --  17   CREATININE 0.75 0.9 0.91   GLUCOSE 96  --  132*     Recent Labs     08/03/21  0245 08/04/21  0707   AST 26 19   ALT 24 18   BILITOT 0.3 <0.2   ALKPHOS 94 82       Current Facility-Administered Medications   Medication Dose Route Frequency Provider Last Rate Last Admin    losartan (COZAAR) tablet 100 mg  100 mg Oral BID Fernando Archibald MD        sodium chloride flush 0.9 % injection 5-40 mL  5-40 mL Intravenous 2 times per day Garrie Ivon, APRN - CNP   10 mL at 08/04/21 0753    sodium chloride flush 0.9 % injection 5-40 mL  5-40 mL Intravenous PRN Garrie Ivon, APRN - CNP        0.9 % sodium chloride infusion  25 mL Intravenous PRN Garrie Ivon, APRN - CNP        enoxaparin (LOVENOX) injection 40 mg  40 mg Subcutaneous Daily Garrie Ivon, APRN - CNP   40 mg at 08/04/21 8246    ondansetron (ZOFRAN-ODT) disintegrating tablet 4 mg  4 mg Oral Q8H PRN Garrie Ivon, APRN - CNP        Or    ondansetron [facility]) injection 4 mg  4 mg Intravenous Q6H PRN Garrie Ivon, APRN - CNP        polyethylene glycol (GLYCOLAX) packet 17 g  17 g Oral Daily PRN Garrie Ivon, APRN - CNP        acetaminophen (TYLENOL) tablet 650 mg  650 mg Oral Q6H PRN Garrie Ivon, APRN - CNP        Or    acetaminophen (TYLENOL) suppository 650 mg  650 mg Rectal Q6H PRN Garrie Ivon, APRN - CNP        ipratropium-albuterol (DUONEB) nebulizer solution 1 ampule  1 ampule Inhalation Q4H PRN Garrie Ivon, APRN - CNP        hydrALAZINE (APRESOLINE) injection 10 mg  10 mg Intravenous Q4H PRN Rafaela Guadalupe DO        hydrALAZINE (APRESOLINE) tablet 100 mg  100 mg Oral 2 times per day Fernando Archibald MD   100 mg at 08/04/21 0752    aspirin EC tablet 81 mg  81 mg Oral Daily Fernando Archibald MD   81 mg at 08/04/21 7391    isosorbide mononitrate (IMDUR) extended release tablet 30 mg  30 mg Oral Daily Fernando Archibald MD   30 mg at 08/04/21 0752    nicotine (NICODERM CQ) 21 MG/24HR 1 patch  1 patch Transdermal Daily Amanda Lugo DO   1 patch at 08/04/21 0753    ipratropium-albuterol (DUONEB) nebulizer solution 1 ampule  1 ampule Inhalation Q4H WA Amanda Lugo, DO   1 ampule at 08/04/21 1133    predniSONE (DELTASONE) tablet 40 mg  40 mg Oral Daily Amanda Lugo DO   40 mg at 08/04/21 0752    guaiFENesin [facility]) extended release tablet 600 mg  600 mg Oral BID Amanda Lugo DO   600 mg at 08/04/21 0752    pantoprazole (PROTONIX) tablet 40 mg  40 mg Oral QAM AC Vanessa Queen, DO   40 mg at 08/04/21 9599       Additional work up or/and treatment plan may be added today or then after based on clinical progression. I am managing a portion of pt care. Some medical issues are handled by other specialists. Additional work up and treatment should be done in out pt setting by pt PCP and other out pt providers. In addition to examining and evaluating pt, I spent additional time explaining care, normaland abnormal findings, and treatment plan. All of pt questions were answered. Counseling, diet and education were provided. Case will be discussed with nursing staff when appropriate. Family will be updated if and when appropriate.        Electronically signed by Amanda Lugo DO on 8/4/2021 at 1:42 PM

## 2021-08-04 NOTE — FLOWSHEET NOTE
AM assessment completed. Patient resting in bed at this time. Denies any chest pain. Remains NSR on the monitor. No edema noted. Pedal pulses palpable. Shortness of breath noted with exertion. Lungs are diminished with expiratory wheezes bilaterally. SATS 97% on RA. Has an occasional dry harsh cough. He is A/Ox3 and can be up independently in the room. Denies any pain with elimination. Skin remains warm, dry and intact. #20g SL to left AC, flushed and patent. Vital signs stable and AM medications provided. Call light remains in reach.  Electronically signed by Gailen Cockayne, RN on 8/4/2021 at 10:35 AM

## 2021-08-04 NOTE — PROGRESS NOTES
Progress Note  Patient: Darren Baldwin  Unit/Bed: D429/V859-13  YOB: 1970  MRN: [id number]  Acct: [de-identified]   Admitting Diagnosis: Chest pain [R07.9]  Admit Date:  8/3/2021  Hospital Day: 0    Chief Complaint: CP    Histories:  Past Medical History:   Diagnosis Date    COPD (chronic obstructive pulmonary disease) ([facility] [address]     Exertional dyspnea 3/9/2012    Other chest pain     Personal history of DVT (deep vein thrombosis) 3/9/2012    Personal history of pulmonary embolism 3/9/2012    Smoking 3/9/2012     Past Surgical History:   Procedure Laterality Date    HAND SURGERY      right hand     History reviewed. No pertinent family history. Social History     Socioeconomic History    Marital status:      Spouse name: None    Number of children: None    Years of education: None    Highest education level: None   Occupational History    None   Tobacco Use    Smoking status: Current Every Day Smoker     Packs/day: 1.00     Years: 30.00     Pack years: 30.00     Last attempt to quit: 1/10/2019     Years since quittin.5    Smokeless tobacco: Former User     Quit date: 1988   Substance and Sexual Activity    Alcohol use: Yes     Comment: socially    Drug use: No    Sexual activity: Yes   Other Topics Concern    None   Social History Narrative    None     Social Determinants of Health     Financial Resource Strain:     Difficulty of Paying Living Expenses:    Food Insecurity:     Worried About Running Out of Food in the Last Year:     [address] in the Last Year:    Transportation Needs:     Lack of Transportation (Medical):      Lack of Transportation (Non-Medical):    Physical Activity:     Days of Exercise per Week:     Minutes of Exercise per Session:    Stress:     Feeling of Stress :    Social Connections:     Frequency of Communication with Friends and Family:     Frequency of Social Gatherings with Friends and Family:     Attends Spiritism Services:     Active Member of Clubs or Organizations:     Attends Club or Organization Meetings:     Marital Status:    Intimate Partner Violence:     Fear of Current or Ex-Partner:     Emotionally Abused:     Physically Abused:     Sexually Abused:        Subjective/HPI pt CP is Epigastric pain radiating up. EKG: SR 70         Review of Systems:   Review of Systems   Constitutional: Negative. Negative for diaphoresis and fatigue. HENT: Negative. Eyes: Negative. Respiratory: Negative. Negative for cough, chest tightness, shortness of breath, wheezing and stridor. Cardiovascular: Negative. Negative for chest pain, palpitations and leg swelling. Gastrointestinal: Positive for abdominal pain. Negative for blood in stool and nausea. Genitourinary: Negative. Musculoskeletal: Negative. Skin: Negative. Neurological: Negative. Negative for dizziness, syncope, weakness and light-headedness. Hematological: Negative. Psychiatric/Behavioral: Negative. Physical Examination:    BP (!) 184/97   Pulse 65   Temp 97.5 °F (36.4 °C)   Resp 18   Ht 5' 8\" (1.727 m)   Wt 218 lb (98.9 kg)   SpO2 98%   BMI 33.15 kg/m²    Physical Exam   Constitutional: He appears healthy. No distress. HENT:   Normal cephalic and Atraumatic   Eyes: Pupils are equal, round, and reactive to light. Neck: Thyroid normal. No JVD present. No neck adenopathy. No thyromegaly present. Cardiovascular: Normal rate, regular rhythm, normal heart sounds, intact distal pulses and normal pulses. Pulmonary/Chest: Effort normal and breath sounds normal. He has no wheezes. He has no rales. He exhibits no tenderness. Abdominal: Soft. Bowel sounds are normal. There is no abdominal tenderness. Musculoskeletal:         General: No tenderness or edema. Normal range of motion. Cervical back: Normal range of motion and neck supple. Neurological: He is alert and oriented to person, place, and time. Skin: Skin is warm. No cyanosis. Nails show no clubbing.        LABS:  CBC:   Lab Results   Component Value Date    WBC 10.9 08/04/2021    RBC 4.51 08/04/2021    RBC 4.61 01/15/2019    HGB 14.0 08/04/2021    HCT 41.7 08/04/2021    MCV 92.4 08/04/2021    MCH 31.1 08/04/2021    MCHC 33.7 08/04/2021    RDW 14.2 08/04/2021     08/04/2021    MPV 10.9 01/15/2019     CBC with Differential:    Lab Results   Component Value Date    WBC 10.9 08/04/2021    RBC 4.51 08/04/2021    RBC 4.61 01/15/2019    HGB 14.0 08/04/2021    HCT 41.7 08/04/2021     08/04/2021    MCV 92.4 08/04/2021    MCH 31.1 08/04/2021    MCHC 33.7 08/04/2021    RDW 14.2 08/04/2021    NRBC 0.0 01/15/2019    LYMPHOPCT 22.0 08/04/2021    MONOPCT 7.1 08/04/2021    BASOPCT 0.4 08/04/2021    MONOSABS 0.8 08/04/2021    LYMPHSABS 2.4 08/04/2021    EOSABS 0.3 08/04/2021    BASOSABS 0.0 08/04/2021     CMP:    Lab Results   Component Value Date     08/04/2021    K 3.6 08/04/2021     08/04/2021    CO2 22 08/04/2021    BUN 17 08/04/2021    CREATININE 0.91 08/04/2021    GFRAA >60.0 08/04/2021    LABGLOM >60.0 08/04/2021    GLUCOSE 132 08/04/2021    GLUCOSE 153 01/14/2019    PROT 6.3 08/04/2021    LABALBU 4.1 08/04/2021    CALCIUM 8.8 08/04/2021    BILITOT <0.2 08/04/2021    ALKPHOS 82 08/04/2021    AST 19 08/04/2021    ALT 18 08/04/2021     BMP:    Lab Results   Component Value Date     08/04/2021    K 3.6 08/04/2021     08/04/2021    CO2 22 08/04/2021    BUN 17 08/04/2021    LABALBU 4.1 08/04/2021    CREATININE 0.91 08/04/2021    CALCIUM 8.8 08/04/2021    GFRAA >60.0 08/04/2021    LABGLOM >60.0 08/04/2021    GLUCOSE 132 08/04/2021    GLUCOSE 153 01/14/2019     Magnesium:    Lab Results   Component Value Date    MG 2.1 08/03/2021     Troponin:    Lab Results   Component Value Date    TROPONINI <0.010 08/03/2021        Active Hospital Problems    Diagnosis Date Noted    Chest pain [R07.9] 08/03/2021     Priority: Low    HTN (hypertension) [I10]

## 2021-08-04 NOTE — CARE COORDINATION
[facility] [place] Case Management Initial Discharge Assessment    Met with Patient to discuss discharge plan. PCP: Johnny Florez MD                                Date of Last Visit: HAS NOT SEEN DR BRYANT IN YEARS AND WOULD LIKE A NEW PCP. PT CHOSE DR OCONNELL    If no PCP, list provided? Yes    Discharge Planning    Living Arrangements: independently at home    Who do you live with? MOM AND 2 DAUGHTERS    Who helps you with your care:  self    If lives at home:     Do you have any barriers navigating in your home? no    Patient can perform ADL? Yes    Current Services (outpatient and in home) :  None    Dialysis: No    Is transportation available to get to your appointments? Yes    DME Equipment:  no    Respiratory equipment: None    Respiratory provider:  no     Pharmacy:  yes - [address] [place] [address] with Medication Assistance Program?  No      Patient agreeable to [facility] ? Declined    Patient agreeable to SNF/Rehab? Declined    Other discharge needs identified? N/A    Initial Discharge Plan? (Note: please see concurrent daily documentation for any updates after initial note).     HOME, DENIES NEEDS    Readmission Risk              Risk of Unplanned Readmission:  7         Electronically signed by Ladi Chapman RN on 8/4/2021 at 4:07 PM

## 2021-08-04 NOTE — FLOWSHEET NOTE
Medicated with 650mg PO tylenol for complaints of a headache rated 6/10. Ice pack also provided. Lights remain low in his room. Dr Medel Court at bedside to see him. No signs of any acute distress noted. Call light remains in reach.  Electronically signed by Sarthak Gomes RN on 8/4/2021 at 3:44 PM

## 2021-08-05 LAB
BASOPHILS ABSOLUTE: 0 K/UL (ref 0–0.2)
BASOPHILS RELATIVE PERCENT: 0.4 %
EOSINOPHILS ABSOLUTE: 0.1 K/UL (ref 0–0.7)
EOSINOPHILS RELATIVE PERCENT: 0.8 %
HCT VFR BLD CALC: 41.1 % (ref 42–52)
HEMOGLOBIN: 13.6 G/DL (ref 14–18)
LYMPHOCYTES ABSOLUTE: 2.7 K/UL (ref 1–4.8)
LYMPHOCYTES RELATIVE PERCENT: 24 %
MCH RBC QN AUTO: 30.6 PG (ref 27–31.3)
MCHC RBC AUTO-ENTMCNC: 33 % (ref 33–37)
MCV RBC AUTO: 92.7 FL (ref 80–100)
MONOCYTES ABSOLUTE: 0.8 K/UL (ref 0.2–0.8)
MONOCYTES RELATIVE PERCENT: 6.9 %
NEUTROPHILS ABSOLUTE: 7.6 K/UL (ref 1.4–6.5)
NEUTROPHILS RELATIVE PERCENT: 67.9 %
PDW BLD-RTO: 14.6 % (ref 11.5–14.5)
PLATELET # BLD: 221 K/UL (ref 130–400)
RBC # BLD: 4.44 M/UL (ref 4.7–6.1)
WBC # BLD: 11.1 K/UL (ref 4.8–10.8)

## 2021-08-05 PROCEDURE — 85025 COMPLETE CBC W/AUTO DIFF WBC: CPT

## 2021-08-05 PROCEDURE — 99232 SBSQ HOSP IP/OBS MODERATE 35: CPT | Performed by: INTERNAL MEDICINE

## 2021-08-05 PROCEDURE — 6370000000 HC RX 637 (ALT 250 FOR IP): Performed by: INTERNAL MEDICINE

## 2021-08-05 PROCEDURE — 94640 AIRWAY INHALATION TREATMENT: CPT

## 2021-08-05 PROCEDURE — 94761 N-INVAS EAR/PLS OXIMETRY MLT: CPT

## 2021-08-05 PROCEDURE — 2060000000 HC ICU INTERMEDIATE R&B

## 2021-08-05 PROCEDURE — 6360000002 HC RX W HCPCS: Performed by: INTERNAL MEDICINE

## 2021-08-05 PROCEDURE — 99233 SBSQ HOSP IP/OBS HIGH 50: CPT | Performed by: INTERNAL MEDICINE

## 2021-08-05 PROCEDURE — 2580000003 HC RX 258: Performed by: NURSE PRACTITIONER

## 2021-08-05 PROCEDURE — 6360000002 HC RX W HCPCS: Performed by: NURSE PRACTITIONER

## 2021-08-05 PROCEDURE — 36415 COLL VENOUS BLD VENIPUNCTURE: CPT

## 2021-08-05 PROCEDURE — 2580000003 HC RX 258: Performed by: INTERNAL MEDICINE

## 2021-08-05 PROCEDURE — 6370000000 HC RX 637 (ALT 250 FOR IP): Performed by: NURSE PRACTITIONER

## 2021-08-05 RX ORDER — DEXTROMETHORPHAN POLISTIREX 30 MG/5ML
30 SUSPENSION ORAL EVERY 12 HOURS SCHEDULED
Status: DISCONTINUED | OUTPATIENT
Start: 2021-08-05 | End: 2021-08-05

## 2021-08-05 RX ORDER — METHYLPREDNISOLONE SODIUM SUCCINATE 40 MG/ML
40 INJECTION, POWDER, LYOPHILIZED, FOR SOLUTION INTRAMUSCULAR; INTRAVENOUS ONCE
Status: COMPLETED | OUTPATIENT
Start: 2021-08-05 | End: 2021-08-05

## 2021-08-05 RX ORDER — METHYLPREDNISOLONE SODIUM SUCCINATE 40 MG/ML
40 INJECTION, POWDER, LYOPHILIZED, FOR SOLUTION INTRAMUSCULAR; INTRAVENOUS EVERY 8 HOURS
Status: DISCONTINUED | OUTPATIENT
Start: 2021-08-05 | End: 2021-08-06 | Stop reason: HOSPADM

## 2021-08-05 RX ORDER — DEXTROMETHORPHAN POLISTIREX 30 MG/5ML
30 SUSPENSION ORAL EVERY 8 HOURS
Status: DISCONTINUED | OUTPATIENT
Start: 2021-08-05 | End: 2021-08-06 | Stop reason: HOSPADM

## 2021-08-05 RX ADMIN — Medication 10 ML: at 08:12

## 2021-08-05 RX ADMIN — ACETAMINOPHEN 650 MG: 325 TABLET ORAL at 18:35

## 2021-08-05 RX ADMIN — ISOSORBIDE MONONITRATE 30 MG: 30 TABLET, EXTENDED RELEASE ORAL at 08:11

## 2021-08-05 RX ADMIN — PANTOPRAZOLE SODIUM 40 MG: 40 TABLET, DELAYED RELEASE ORAL at 05:51

## 2021-08-05 RX ADMIN — Medication 30 MG: at 18:38

## 2021-08-05 RX ADMIN — HYDRALAZINE HYDROCHLORIDE 100 MG: 100 TABLET, FILM COATED ORAL at 08:11

## 2021-08-05 RX ADMIN — LOSARTAN POTASSIUM 100 MG: 50 TABLET, FILM COATED ORAL at 08:11

## 2021-08-05 RX ADMIN — LOSARTAN POTASSIUM 100 MG: 50 TABLET, FILM COATED ORAL at 20:10

## 2021-08-05 RX ADMIN — ENOXAPARIN SODIUM 40 MG: 40 INJECTION SUBCUTANEOUS at 08:11

## 2021-08-05 RX ADMIN — CEFTRIAXONE SODIUM 1000 MG: 1 INJECTION, POWDER, FOR SOLUTION INTRAMUSCULAR; INTRAVENOUS at 09:42

## 2021-08-05 RX ADMIN — HYDRALAZINE HYDROCHLORIDE 100 MG: 100 TABLET, FILM COATED ORAL at 20:10

## 2021-08-05 RX ADMIN — GUAIFENESIN 600 MG: 600 TABLET ORAL at 20:10

## 2021-08-05 RX ADMIN — IPRATROPIUM BROMIDE AND ALBUTEROL SULFATE 1 AMPULE: .5; 3 SOLUTION RESPIRATORY (INHALATION) at 15:35

## 2021-08-05 RX ADMIN — ASPIRIN 81 MG: 81 TABLET, COATED ORAL at 08:11

## 2021-08-05 RX ADMIN — IPRATROPIUM BROMIDE AND ALBUTEROL SULFATE 1 AMPULE: .5; 3 SOLUTION RESPIRATORY (INHALATION) at 07:22

## 2021-08-05 RX ADMIN — PREDNISONE 40 MG: 10 TABLET ORAL at 08:11

## 2021-08-05 RX ADMIN — Medication 10 ML: at 20:11

## 2021-08-05 RX ADMIN — IPRATROPIUM BROMIDE AND ALBUTEROL SULFATE 1 AMPULE: .5; 3 SOLUTION RESPIRATORY (INHALATION) at 10:54

## 2021-08-05 RX ADMIN — GUAIFENESIN 600 MG: 600 TABLET ORAL at 08:11

## 2021-08-05 RX ADMIN — METHYLPREDNISOLONE SODIUM SUCCINATE 40 MG: 40 INJECTION, POWDER, FOR SOLUTION INTRAMUSCULAR; INTRAVENOUS at 08:40

## 2021-08-05 RX ADMIN — AZITHROMYCIN MONOHYDRATE 500 MG: 500 INJECTION, POWDER, LYOPHILIZED, FOR SOLUTION INTRAVENOUS at 10:20

## 2021-08-05 RX ADMIN — METHYLPREDNISOLONE SODIUM SUCCINATE 40 MG: 40 INJECTION, POWDER, FOR SOLUTION INTRAMUSCULAR; INTRAVENOUS at 18:17

## 2021-08-05 RX ADMIN — DEXTROMETHORPHAN 30 MG: 30 SUSPENSION, EXTENDED RELEASE ORAL at 09:42

## 2021-08-05 RX ADMIN — IPRATROPIUM BROMIDE AND ALBUTEROL SULFATE 1 AMPULE: .5; 3 SOLUTION RESPIRATORY (INHALATION) at 22:10

## 2021-08-05 ASSESSMENT — ENCOUNTER SYMPTOMS
COUGH: 0
STRIDOR: 0
SHORTNESS OF BREATH: 0
WHEEZING: 0
RESPIRATORY NEGATIVE: 1
BLOOD IN STOOL: 0
CHEST TIGHTNESS: 0
EYES NEGATIVE: 1
NAUSEA: 0
ABDOMINAL PAIN: 1

## 2021-08-05 ASSESSMENT — PAIN SCALES - GENERAL
PAINLEVEL_OUTOF10: 0
PAINLEVEL_OUTOF10: 6

## 2021-08-05 NOTE — FLOWSHEET NOTE
AM assessment completed. Patient resting in bed at this time. Complains of intermittent chest discomfort, but states he has been coughing a lot. Harsh, moist, non-productive cough noted. Remains NSR on the monitor. No edema noted. Shortness of breath noted with exertion or with his coughing episodes. Lungs are diminished bilaterally with expiratory wheezes throughout. SATS 99% on RA. He is A/Ox3 and can be up independently in the room as tolerated. Denies any pain with elimination. Skin remains warm, dry and intact. #20g SL to left AC, flushed and patent. Site wrapped with gauze to protect. BP elevated at 153/100. AM medications provided. Perfect serve message sent to Dr Anniece Baumgarten requesting cough medicine and letting him know of BP reading. Orders placed by him. Call light remains in reach.  Electronically signed by Gailen Cockayne, RN on 8/5/2021 at 8:31 AM

## 2021-08-05 NOTE — FLOWSHEET NOTE
Medicated with 650mg PO tylenol for complaints of 6/10 headache. No signs of any acute distress noted. Call light remains in reach.  Electronically signed by Ankur Seals RN on 8/5/2021 at 7:17 PM

## 2021-08-05 NOTE — FLOWSHEET NOTE
Dr Jamaal Dumont present on the unit. Updated him regarding patients wheezes and coughing episodes. Orders obtained.  Electronically signed by Kelly Cooper RN on 8/5/2021 at 8:31 AM

## 2021-08-05 NOTE — FLOWSHEET NOTE
Medicated with 30mg PO delsym for persistent cough. Remains in bed at this time with TV on. Call light remains in reach.  Electronically signed by Nigel Reyna RN on 8/5/2021 at 10:32 AM

## 2021-08-05 NOTE — PROGRESS NOTES
Physician Progress Note    2021   1:34 PM    Name:  Magno Weaver  MRN:    [id number]      Day: 1     Admit Date: 8/3/2021  2:16 AM  PCP: Sundeep Culp MD    Code Status:  Full Code      Assessment and Plan: Active Problems/ diagnosis:     Chest pain-rule out ACS  Possible pericardial effusion  mild COPD exacerbation  Tobacco use   GERD  Abnormal CT scan of the chest    Plan  1. ACS ruled out. Appreciate cardiology input  2. Appreciate pulmonary input. Was started on Ceftin. Resume. 3. Echo unremarkable, EF 60%, no wall motion abnormalities or significant valvular disease, no evidence of pericardial effusion as per report. 4. Will need follow-up CT scan as outpatient to ensure resolution of CT scanning abnormality in his lung. 5. Resume the patient on prednisone, inhaled treatment and Mucinex. 6. Resume PPIs  7. Educated on tobacco cessation  8. Nicotine patch. 9. Home medication resumed    DVT PPx    Dispo-anticipate discharge next 24 to 48 hours if his respiratory symptoms are better. Subjective:     Chest pain is mildly improving. However he has cough and wheezing. Physical Examination:      Vitals:  BP (!) 153/100   Pulse 69   Temp 97.9 °F (36.6 °C) (Oral)   Resp 20   Ht 5' 8\" (1.727 m)   Wt 218 lb (98.9 kg)   SpO2 99%   BMI 33.15 kg/m²   Temp (24hrs), Av.9 °F (36.6 °C), Min:97.7 °F (36.5 °C), Max:97.9 °F (36.6 °C)      General appearance: alert, cooperative and no distress  Mental Status: oriented to person, place and time and normal affect  Lungs: Expiratory wheezing bilaterally. Coughs with deep breathing.   Heart: regular rate and rhythm, no murmur  Abdomen: soft, nontender, nondistended, bowel sounds present, no masses  Extremities: no edema, redness, tenderness in the calves  Skin: no gross lesions, rashes    Data:     Labs:  Recent Labs     21  0707 21  0616   WBC 10.9* 11.1*   HGB 14.0 13.6*    221     Recent Labs     21  0245 08/03/21  0321 08/04/21  0707     --  142   K 3.9  --  3.6     --  105   CO2 23  --  22   BUN 11  --  17   CREATININE 0.75 0.9 0.91   GLUCOSE 96  --  132*     Recent Labs     08/03/21  0245 08/04/21  0707   AST 26 19   ALT 24 18   BILITOT 0.3 <0.2   ALKPHOS 94 82       Current Facility-Administered Medications   Medication Dose Route Frequency Provider Last Rate Last Admin    dextromethorphan (DELSYM) 30 MG/5ML extended release liquid 30 mg  30 mg Oral 2 times per day Mace Meuse, DO   30 mg at 08/05/21 0942    methylPREDNISolone sodium (SOLU-MEDROL) injection 40 mg  40 mg Intravenous Q8H Hansel Yan MD        cefTRIAXone (ROCEPHIN) 1000 mg IVPB in 50 mL D5W minibag  1,000 mg Intravenous Q24H Ishmael Valle MD   Stopped at 08/05/21 1020    azithromycin (ZITHROMAX) 500 mg in D5W 250ml addavial  500 mg Intravenous Q24H Ishmael Valle MD   Stopped at 08/05/21 1125    losartan (COZAAR) tablet 100 mg  100 mg Oral BID Thomas Zhang MD   100 mg at 08/05/21 3308    sodium chloride flush 0.9 % injection 5-40 mL  5-40 mL Intravenous 2 times per day RODERICK De Leon CNP   10 mL at 08/05/21 7143    sodium chloride flush 0.9 % injection 5-40 mL  5-40 mL Intravenous PRN RODERICK De Leon CNP        0.9 % sodium chloride infusion  25 mL Intravenous PRN RODERICK De Leon CNP        enoxaparin (LOVENOX) injection 40 mg  40 mg Subcutaneous Daily RODERICK Tinajero CNP   40 mg at 08/05/21 8768    ondansetron (ZOFRAN-ODT) disintegrating tablet 4 mg  4 mg Oral Q8H PRN RODERICK De Leon CNP        Or    ondansetron [facility]) injection 4 mg  4 mg Intravenous Q6H PRN RODERICK De Leon CNP        polyethylene glycol (GLYCOLAX) packet 17 g  17 g Oral Daily PRN RODERICK De Leon CNP        acetaminophen (TYLENOL) tablet 650 mg  650 mg Oral Q6H PRN RODERICK De Leon CNP   650 mg at 08/04/21 1533 Or    acetaminophen (TYLENOL) suppository 650 mg  650 mg Rectal Q6H PRN Licoe Ivon, APRN - CNP        ipratropium-albuterol (DUONEB) nebulizer solution 1 ampule  1 ampule Inhalation Q4H PRN Naomi Choeona, APRN - CNP        hydrALAZINE (APRESOLINE) injection 10 mg  10 mg Intravenous Q4H PRN Rafaela Guadalupe,         hydrALAZINE (APRESOLINE) tablet 100 mg  100 mg Oral 2 times per day Fernando Archibald MD   100 mg at 08/05/21 9423    aspirin EC tablet 81 mg  81 mg Oral Daily Fernando Archibald MD   81 mg at 08/05/21 5758    isosorbide mononitrate (IMDUR) extended release tablet 30 mg  30 mg Oral Daily Fernando Archibald MD   30 mg at 08/05/21 3715    nicotine (NICODERM CQ) 21 MG/24HR 1 patch  1 patch Transdermal Daily Augustin Mckenzie DO   1 patch at 08/05/21 4725    ipratropium-albuterol (DUONEB) nebulizer solution 1 ampule  1 ampule Inhalation Q4H WA Augustin Mckenzie DO   1 ampule at 08/05/21 1054    guaiFENesin [facility]) extended release tablet 600 mg  600 mg Oral BID Augustin Mckenzie DO   600 mg at 08/05/21 9215    pantoprazole (PROTONIX) tablet 40 mg  40 mg Oral QAM AC Yazid R Bret, DO   40 mg at 08/05/21 0339       Additional work up or/and treatment plan may be added today or then after based on clinical progression. I am managing a portion of pt care. Some medical issues are handled by other specialists. Additional work up and treatment should be done in out pt setting by pt PCP and other out pt providers. In addition to examining and evaluating pt, I spent additional time explaining care, normaland abnormal findings, and treatment plan. All of pt questions were answered. Counseling, diet and education were provided. Case will be discussed with nursing staff when appropriate. Family will be updated if and when appropriate.        Electronically signed by Augustin Mckenzie DO on 8/5/2021 at 1:34 PM

## 2021-08-05 NOTE — CONSULTS
Pulmonary Medicine  Consult Note      Reason for consultation: COPD    Consulting physician: Dr. Mendel Brambila:    This is 82-year-old several days to week of chest pain in midsternal area. He has shortness of breath. He also has cough. He has history of hypertension and has been noncompliant. .  He said he has been diagnosed with COPD. He also has a history of DVT and PE and he has taken Coumadin for 1 year. He is not on any anticoagulation therapy at this time. Other medical history include history of obstructive sleep apnea, Legionella pneumonia. He has extensive drug addiction and just completed rehab. He said he is clean for the last few months. He was doing meth and crack cocaine he is smoking and still continue to smoke. Pulmonary consult was done regarding COPD. He is on nebulizer with albuterol and Atrovent every 4 hourly while awake, prednisone 40 mg daily. NicoDerm patch daily. He is currently on room air and his oxygen saturation is 96% he had CT chest done shows no PE.  3.7 x 2.6 cm area of decreased attenuation adjacent to dependent posterior pericardium findings suggestive of pericardial effusion. Right lung, no nodule, mass, pleural effusion or pneumothorax. Dependent subsegmental consolidation right lower lobe and right middle lobe. Left lung shows dependent subsegmental consolidation left lower lobe and left upper lobe. Pt is currently incarcerated for Domestic Violence charge. Pt is restrained by YADY Kat and under secure observation.      Past Medical History:        Diagnosis Date    COPD (chronic obstructive pulmonary disease) ([facility] [address]     Exertional dyspnea 3/9/2012    Other chest pain     Personal history of DVT (deep vein thrombosis) 3/9/2012    Personal history of pulmonary embolism 3/9/2012    Smoking 3/9/2012       Past Surgical History:        Procedure Laterality Date    HAND SURGERY      right hand       Social History:     reports that he has been smoking. He has a 30.00 pack-year smoking history. He quit smokeless tobacco use about 33 years ago. He reports current alcohol use. He reports that he does not use drugs. Family History:   History reviewed. No pertinent family history. Allergies:  Bee venom, Lisinopril, and Codeine        MEDICATIONS during current hospitalization:    Continuous Infusions:   sodium chloride         Scheduled Meds:   losartan  100 mg Oral BID    sodium chloride flush  5-40 mL Intravenous 2 times per day    enoxaparin  40 mg Subcutaneous Daily    hydrALAZINE  100 mg Oral 2 times per day    aspirin  81 mg Oral Daily    isosorbide mononitrate  30 mg Oral Daily    nicotine  1 patch Transdermal Daily    ipratropium-albuterol  1 ampule Inhalation Q4H WA    predniSONE  40 mg Oral Daily    guaiFENesin  600 mg Oral BID    pantoprazole  40 mg Oral QAM AC       PRN Meds:sodium chloride flush, sodium chloride, ondansetron **OR** ondansetron, polyethylene glycol, acetaminophen **OR** acetaminophen, ipratropium-albuterol, hydrALAZINE    REVIEW OF SYSTEMS:  As in history of present illness  Other 14 point review of system is negative. PHYSICAL EXAM:    Vitals:  BP (!) 151/83   Pulse 75   Temp 97.9 °F (36.6 °C) (Oral)   Resp 18   Ht 5' 8\" (1.727 m)   Wt 218 lb (98.9 kg)   SpO2 96%   BMI 33.15 kg/m²   General:Alert, awake, Oriented x3  .comfortable in bed, No distress. Head: Atraumatic , Normocephalic   Eyes: PERRL. No sclera icterus. No conjunctival injection. No discharge   ENT: No nasal  discharge. Pharynx clear. Neck:  Trachea midline. No thyromegaly, no JVD, No cervical adenopathy. Chest : Bilaterally symmetrical ,Normal effort,  No accessory muscle use  Lung : Diminished BS bilateraly. No Rales. Few scattered expiratory wheezing. No rhonchi. Heart[de-identified] Normal  rate. Regular rhythm. No mumur ,  Rub or gallop  ABD: Non-tender. Non-distended. No masses. No organmegaly. Normal bowel sounds.  No hernia. Extremities: No pitting in both lower leg , No Cyanosis ,No clubbing  Neuro:no cranial nerve abnormality, normal reflex and sensation, no focal weakness   Skin: Warm and dry. No erythema rash on exposed extremities. Data Review  Recent Labs     08/03/21  0245 08/04/21  0707   WBC 8.8 10.9*   HGB 14.9 14.0   HCT 43.8 41.7*    217      Recent Labs     08/03/21  0245 08/03/21  0321 08/04/21  0707     --  142   K 3.9  --  3.6     --  105   CO2 23  --  22   BUN 11  --  17   CREATININE 0.75 0.9 0.91   GLUCOSE 96  --  132*       MV Settings: ABGs: No results for input(s): PHART, ASK9WBH, PO2ART, CXN4XAP, BEART, I7EVCLSI, GAJ0JOH in the last 72 hours. O2 Device: None (Room air)  O2 Flow Rate (L/min): 2 L/min  No results found for: [address]    Radiology  Echocardiogram complete 2D with doppler with color    Result Date: 8/4/2021  Transthoracic Echocardiography Report (TTE)  Demographics   Patient Name   Marlene Frye  Gender               Male                 R   Patient Number [id number]         Race                                                    Ethnicity   Visit Number   [id number]        Room Number          E448   Corporate ID                    Date of Study        08/03/2021   Accession      [id number]       Referring Physician  Number   Date of Birth  1970       Sonographer          Davon Johnson   Age            54 year(s)       Interpreting         [facility])                                  Physician            Cardiology                                                       Rivka Moore,   Procedure Type of Study   TTE procedure:ECHO COMPLETE 2D W/DOP W/COLOR. Procedure Date Date: 08/03/2021 Start: 11:08 AM Study Location: Portable Technical Quality: Adequate visualization Indications:Pericardial effusion.  Patient Status: Routine Height: 68 inches Weight: 218 pounds BSA: 2.12 m^2 BMI: 33.15 kg/m^2 BP: 171/99 mmHg  Conclusions   Summary  Normal left ventricular systolic function, no regional wall motion  abnormalities, estimated ejection fraction of 60%. Normal left ventricular size and function. Mild concentric left ventricular hypertrophy. Impaired relaxation compatible with diastolic dysfunction. ( reversed E/A  ratio)  No evidence of mitral regurgitation. No evidence of mitral valve stenosis. No evidence of aortic valve regurgitation . No evidence of aortic valve stenosis. There is Trace tricuspid regurgitation with estimated RVSP of 20 mm Hg. Signature   ----------------------------------------------------------------  Electronically signed by Danilo Stout DO(Interpreting  physician) on 08/04/2021 07:21 AM  ----------------------------------------------------------------   Findings  Left Ventricle Normal left ventricular systolic function, no regional wall motion abnormalities, estimated ejection fraction of 60%. Normal left ventricular size and function. Mild concentric left ventricular hypertrophy. Impaired relaxation compatible with diastolic dysfunction. ( reversed E/A ratio) Right Ventricle Normal right ventricle structure and function. Normal right ventricle systolic pressure. Left Atrium Normal left atrium. Right Atrium Normal right atrium. Mitral Valve Diffusely thickened and pliable mitral valve leaflets with normal excursion. No evidence of mitral regurgitation. No evidence of mitral valve stenosis. Tricuspid Valve Normal tricuspid valve structure and function. There is Trace tricuspid regurgitation with estimated RVSP of 20 mm Hg. Aortic Valve Structurally normal aortic valve. No evidence of aortic valve regurgitation . No evidence of aortic valve stenosis. Pulmonic Valve Normal pulmonic valve structure and function. Pericardial Effusion No evidence of pericardial effusion. Pleural Effusion No evidence of pleural effusion. Aorta \ Miscellaneous Miscellaneous normal findings were found.  M-Mode Measurements (cm)   LVIDd: 4.31 cm LVIDs: 3.22 cm  IVSd: 1.35 cm                          IVSs: 1.46 cm  LVPWd: 1.36 cm                         LVPWs: 1.82 cm  Rt. Vent.  Dimension: 2.63 cm           AO Root Dimension: 2.66 cm                                          LVOT: 1.87 cm  Doppler Measurements:   AV Velocity:0.02 m/s                    MV Peak E-Wave: 0.97 m/s  AV Peak Gradient: 10.99 mmHg            MV Peak A-Wave: 0.87 m/s  AV Mean Gradient: 6.78 mmHg  AV Area (Continuity):1.56 cm^2  TR Velocity:1.62 m/s                    Estimated RAP:10 mmHg  TR Gradient:10.43 mmHg                  RVSP:20.43 mmHg  Valves  Mitral Valve   Peak E-Wave: 0.97 m/s           Peak A-Wave: 0.87 m/s                                  E/A Ratio: 1.11                                  Peak Gradient: 3.75 mmHg                                  Deceleration Time: 217.7 msec   Aortic Valve   Peak Velocity: 1.66 m/s                Mean Velocity: 1.26 m/s  Peak Gradient: 10.99 mmHg              Mean Gradient: 6.78 mmHg  Area (continuity): 1.56 cm^2  AV VTI: 33.68 cm   Tricuspid Valve   Estimated RVSP: 20.43 mmHg              Estimated RAP: 10 mmHg  TR Velocity: 1.62 m/s                   TR Gradient: 10.43 mmHg   Pulmonic Valve   Peak Velocity: 0.95 m/s           Peak Gradient: 3.57 mmHg                                    Estimated PASP: 20.43 mmHg   LVOT   Peak Velocity: 1.14 m/s              Mean Velocity: 0.78 m/s  Peak Gradient: 4.75 mmHg             Mean Gradient: 2.75 mmHg  LVOT Diameter: 1.87 cm               LVOT VTI: 19.1 cm  Structures  Left Atrium   LA Volume/Index: 54.51 ml /26 m^2             LA Area: 18.73 cm^2   Left Ventricle   Diastolic Dimension: 8.76 cm        Systolic Dimension: 6.80 cm  Septum Diastolic: 0.29 cm           Septum Systolic: 7.29 cm  PW Diastolic: 1.07 cm               PW Systolic: 1.72 cm                                      FS: 25.3 %  LV EDV/LV EDV Index: 83.3 ml/39 m^2 LV ESV/LV ESV Index: 41.67 ml/20 m^2  EF Calculated: 50 %   LVOT Diameter: 1.87 cm   Right Atrium   RA Systolic Pressure: 10 mmHg   Right Ventricle   Diastolic Dimension: 0.01 cm                                    RV Systolic Pressure: 67.71 mmHg  Aorta/ Miscellaneous Aorta   Aortic Root: 2.66 cm  LVOT Diameter: 1.87 cm      CTA Chest W WO  (PE study)    Result Date: 8/3/2021  CT PULMONARY ANGIOGRAM WITH INTRAVENOUS CONTRAST MEDIUM. REASON FOR EXAMINATION:  SHORTNESS OF BREATH, CHEST PAIN TECHNIQUE: Helical CTA was performed through the chest utilizing 100 ml of Isovue-370 intravenous contrast.  Images were obtained with bolus tracking in order to opacify the pulmonary arteries. Thick section coronal MIP 3D reconstructions were performed  on a separate workstation. COMPARISON:none FINDINGS:  Pulmonary arteries: No intraluminal filling defects. Thoracic aorta: Normal in course and caliber. Cardiac Size: Normal. Pericardial effusion: 3.7 x 2.6 cm area decreased attenuation, adjacent to dependent posterior pericardium (series 2, image 82). Right lung: No nodules, masses, pleural effusion, pneumothorax. Dependent subsegmental consolidation right lower lobe, right middle lobe. Left lung: No nodules, masses, pleural effusion, pneumothorax. Dependent subsegmental consolidation left lower lobe, left upper lobe. Lymph nodes: No hilar, mediastinal, or axillary lymph node enlargement. Upper abdomen:Limited imaging upper abdomen shows 1 mm cystlike area, left hepatic lobe, Hounsfield measurement -7 to +12. Musculoskeletal:No osteoblastic, and no osteolytic lesions. . Large bridging anterior osteophytes thoracic spine. No CT evidence pulmonary embolism. 3.7 x 2.6 cm triangular shaped area decreased attenuation having dependent posterior pericardium. Finding may represent pericardial effusion.  Clinical correlation required All CT scans at this facility use dose modulation, iterative reconstruction, and/or weight based dosing when appropriate to reduce radiation dose to as low as reasonably achievable. Assessment and  plan:     1. 3.7 x 2.6 cm triangular-shaped density near posterior pericardium r/o etiology  2. Subsegmental atelectasis versus infiltration in right middle lower lobe and left lower lobe. 3. COPD with exacerbation  4. History of DVT and PE. Current CT chest negative for PE  5. Tobacco abuse, 2 pack/day  6. Hypertension    Patient had CT chest which I reviewed shows triangular-shaped density near the posterior pericardium, etiology unclear, could be atelectatic lung, infiltration or mass. There is  some atelectasis infiltration in the right middle lower lobe and left lower lobe area. He is afebrile. Add empiric antibiotic with Ceftin 500 mg p.o. twice daily for 10 days. Recommend follow-up CT chest in 3-4 weeks and if persistent triangular areas of density then recommend, PET scan for further evaluation to rule out any malignancy. Agree with bronchodilator therapy with DuoNeb every 4 hourly while awake. Prednisone 40 mg daily. He is currently on room air O2 saturation 96%. Recommend smoking cessation. He is on NicoDerm patch. PFT as outpatient. Follow-up with pulmonary in 2 weeks after discharge. Thank you for consult    NOTE: This report was transcribed using voice recognition software. Every effort was made to ensure accuracy; however, inadvertent computerized transcription errors may be present.     Electronically signed by Ambrosio López MD, FCCP on 8/4/2021 at 8:37 PM

## 2021-08-05 NOTE — PROGRESS NOTES
INPATIENT PROGRESS NOTES    PATIENT NAME: Kamala Campos  MRN: [id number]  SERVICE DATE:  2021   SERVICE TIME:  10:44 AM      PRIMARY SERVICE: Pulmonary Disease    CHIEF COMPLAINTS: COPD    INTERVAL HPI: Patient seen and examined at bedside, Interval Notes, orders reviewed. Nursing notes noted    Patient reports shortness of breath this a.m. and increased coughing. Patient is unable to bring up any sputum at this time. Patient states respiratory treatments help but they do make him cough more. Patient is currently on room air sats are 99%. Patient states he is able to breathe a little bit better now than earlier this a.m. no fever overnight, no nausea no vomiting, no edema. Review of system:     GI Abdominal pain No  Skin Rash No    Social History     Tobacco Use    Smoking status: Current Every Day Smoker     Packs/day: 1.00     Years: 30.00     Pack years: 30.00     Last attempt to quit: 1/10/2019     Years since quittin.5    Smokeless tobacco: Former User     Quit date: 1988   Substance Use Topics    Alcohol use: Yes     Comment: socially     History reviewed. No pertinent family history. OBJECTIVE    Body mass index is 33.15 kg/m². PHYSICAL EXAM:  Vitals:  BP (!) 153/100   Pulse 69   Temp 97.9 °F (36.6 °C) (Oral)   Resp 20   Ht 5' 8\" (1.727 m)   Wt 218 lb (98.9 kg)   SpO2 99%   BMI 33.15 kg/m²     General: alert, cooperative  Head: normocephalic, atraumatic  Eyes:No gross abnormalities. ENT:  MMM no lesions  Neck:  supple and no masses  Chest : Fair air movement, expiratory wheezes, bibasilar rales  Heart[de-identified] Heart sounds are normal.  Regular rate and rhythm without murmur, gallop or rub. ABD:  bowel sounds normal, soft, non-tender  Musculoskeletal : no cyanosis, no clubbing and no edema  Neuro:  Grossly normal  Skin: No rashes or nodules noted.   Lymph node:  no cervical nodes  Urology: No Cooley   Psychiatric: appropriate    DATA:   Recent Labs     21  0707 with doppler with color    Result Date: 8/4/2021  Transthoracic Echocardiography Report (TTE)  Demographics   Patient Name   Joann Sigala  Gender               Male                 R   Patient Number [id number]         Race                                                    Ethnicity   Visit Number   [id number]        Room Number          B477   Corporate ID                    Date of Study        08/03/2021   Accession      [id number]       Referring Physician  Number   Date of Birth  1970       Sonographer          Eddie Briones   Age            54 year(s)       Interpreting         [facility])                                  Physician            Cardiology                                                       Mick SEGOVIA DO  Procedure Type of Study   TTE procedure:ECHO COMPLETE 2D W/DOP W/COLOR. Procedure Date Date: 08/03/2021 Start: 11:08 AM Study Location: Portable Technical Quality: Adequate visualization Indications:Pericardial effusion. Patient Status: Routine Height: 68 inches Weight: 218 pounds BSA: 2.12 m^2 BMI: 33.15 kg/m^2 BP: 171/99 mmHg  Conclusions   Summary  Normal left ventricular systolic function, no regional wall motion  abnormalities, estimated ejection fraction of 60%. Normal left ventricular size and function. Mild concentric left ventricular hypertrophy. Impaired relaxation compatible with diastolic dysfunction. ( reversed E/A  ratio)  No evidence of mitral regurgitation. No evidence of mitral valve stenosis. No evidence of aortic valve regurgitation . No evidence of aortic valve stenosis. There is Trace tricuspid regurgitation with estimated RVSP of 20 mm Hg.    Signature   ----------------------------------------------------------------  Electronically signed by Víctor Toledo DO(Interpreting  physician) on 08/04/2021 07:21 AM  ----------------------------------------------------------------   Findings  Left Ventricle Normal left ventricular systolic function, no regional wall motion abnormalities, estimated ejection fraction of 60%. Normal left ventricular size and function. Mild concentric left ventricular hypertrophy. Impaired relaxation compatible with diastolic dysfunction. ( reversed E/A ratio) Right Ventricle Normal right ventricle structure and function. Normal right ventricle systolic pressure. Left Atrium Normal left atrium. Right Atrium Normal right atrium. Mitral Valve Diffusely thickened and pliable mitral valve leaflets with normal excursion. No evidence of mitral regurgitation. No evidence of mitral valve stenosis. Tricuspid Valve Normal tricuspid valve structure and function. There is Trace tricuspid regurgitation with estimated RVSP of 20 mm Hg. Aortic Valve Structurally normal aortic valve. No evidence of aortic valve regurgitation . No evidence of aortic valve stenosis. Pulmonic Valve Normal pulmonic valve structure and function. Pericardial Effusion No evidence of pericardial effusion. Pleural Effusion No evidence of pleural effusion. Aorta \ Miscellaneous Miscellaneous normal findings were found. M-Mode Measurements (cm)   LVIDd: 4.31 cm                         LVIDs: 3.22 cm  IVSd: 1.35 cm                          IVSs: 1.46 cm  LVPWd: 1.36 cm                         LVPWs: 1.82 cm  Rt. Vent.  Dimension: 2.63 cm           AO Root Dimension: 2.66 cm                                          LVOT: 1.87 cm  Doppler Measurements:   AV Velocity:0.02 m/s                    MV Peak E-Wave: 0.97 m/s  AV Peak Gradient: 10.99 mmHg            MV Peak A-Wave: 0.87 m/s  AV Mean Gradient: 6.78 mmHg  AV Area (Continuity):1.56 cm^2  TR Velocity:1.62 m/s                    Estimated RAP:10 mmHg  TR Gradient:10.43 mmHg                  RVSP:20.43 mmHg  Valves  Mitral Valve   Peak E-Wave: 0.97 m/s           Peak A-Wave: 0.87 m/s                                  E/A Ratio: 1.11                                  Peak Gradient: 3.75 mmHg Deceleration Time: 217.7 msec   Aortic Valve   Peak Velocity: 1.66 m/s                Mean Velocity: 1.26 m/s  Peak Gradient: 10.99 mmHg              Mean Gradient: 6.78 mmHg  Area (continuity): 1.56 cm^2  AV VTI: 33.68 cm   Tricuspid Valve   Estimated RVSP: 20.43 mmHg              Estimated RAP: 10 mmHg  TR Velocity: 1.62 m/s                   TR Gradient: 10.43 mmHg   Pulmonic Valve   Peak Velocity: 0.95 m/s           Peak Gradient: 3.57 mmHg                                    Estimated PASP: 20.43 mmHg   LVOT   Peak Velocity: 1.14 m/s              Mean Velocity: 0.78 m/s  Peak Gradient: 4.75 mmHg             Mean Gradient: 2.75 mmHg  LVOT Diameter: 1.87 cm               LVOT VTI: 19.1 cm  Structures  Left Atrium   LA Volume/Index: 54.51 ml /26 m^2             LA Area: 18.73 cm^2   Left Ventricle   Diastolic Dimension: 3.10 cm        Systolic Dimension: 6.41 cm  Septum Diastolic: 0.89 cm           Septum Systolic: 3.80 cm  PW Diastolic: 4.54 cm               PW Systolic: 4.74 cm                                      FS: 25.3 %  LV EDV/LV EDV Index: 83.3 ml/39 m^2 LV ESV/LV ESV Index: 41.67 ml/20 m^2  EF Calculated: 50 %   LVOT Diameter: 1.87 cm   Right Atrium   RA Systolic Pressure: 10 mmHg   Right Ventricle   Diastolic Dimension: 0.94 cm                                    RV Systolic Pressure: 76.66 mmHg  Aorta/ Miscellaneous Aorta   Aortic Root: 2.66 cm  LVOT Diameter: 1.87 cm      CTA Chest W WO  (PE study)    Result Date: 8/3/2021  CT PULMONARY ANGIOGRAM WITH INTRAVENOUS CONTRAST MEDIUM. REASON FOR EXAMINATION:  SHORTNESS OF BREATH, CHEST PAIN TECHNIQUE: Helical CTA was performed through the chest utilizing 100 ml of Isovue-370 intravenous contrast.  Images were obtained with bolus tracking in order to opacify the pulmonary arteries. Thick section coronal MIP 3D reconstructions were performed  on a separate workstation. COMPARISON:none FINDINGS:  Pulmonary arteries: No intraluminal filling defects.  Thoracic aorta: Normal in course and caliber. Cardiac Size: Normal. Pericardial effusion: 3.7 x 2.6 cm area decreased attenuation, adjacent to dependent posterior pericardium (series 2, image 82). Right lung: No nodules, masses, pleural effusion, pneumothorax. Dependent subsegmental consolidation right lower lobe, right middle lobe. Left lung: No nodules, masses, pleural effusion, pneumothorax. Dependent subsegmental consolidation left lower lobe, left upper lobe. Lymph nodes: No hilar, mediastinal, or axillary lymph node enlargement. Upper abdomen:Limited imaging upper abdomen shows 1 mm cystlike area, left hepatic lobe, Hounsfield measurement -7 to +12. Musculoskeletal:No osteoblastic, and no osteolytic lesions. . Large bridging anterior osteophytes thoracic spine. No CT evidence pulmonary embolism. 3.7 x 2.6 cm triangular shaped area decreased attenuation having dependent posterior pericardium. Finding may represent pericardial effusion. Clinical correlation required All CT scans at this facility use dose modulation, iterative reconstruction, and/or weight based dosing when appropriate to reduce radiation dose to as low as reasonably achievable. Most recent    Chest CT      WITH CONTRAST:No results found for this or any previous visit. WITHOUT CONTRAST: No results found for this or any previous visit. CXR      2-view: Results for orders placed in visit on 05/03/18    XR CHEST STANDARD (2 VW)    Narrative  DIAGNOSIS:R06.02 SOB (shortness of breath) ICD10    COMPARISON: March 1, 2010    TECHNIQUE: PA and lateral chest    FINDINGS: The lungs contain no focal infiltrate, pleural effusion, or consolidation or pneumothorax. The cardiac silhouette is not enlarged. Degenerative changes are seen in the dorsal spine. .    Impression  No acute cardiopulmonary process       Portable: No results found for this or any previous visit. Echo No results found for this or any previous visit.           IMPRESSION AND

## 2021-08-05 NOTE — PROGRESS NOTES
Attends [employer] Services:     Active Member of Clubs or Organizations:     Attends Club or Organization Meetings:     Marital Status:    Intimate Partner Violence:     Fear of Current or Ex-Partner:     Emotionally Abused:     Physically Abused:     Sexually Abused:        Subjective/HPI  Coughing more today. Feels a little worse today. EKG: SR 70         Review of Systems:   Review of Systems   Constitutional: Negative. Negative for diaphoresis and fatigue. HENT: Negative. Eyes: Negative. Respiratory: Negative. Negative for cough, chest tightness, shortness of breath, wheezing and stridor. Cardiovascular: Negative. Negative for chest pain, palpitations and leg swelling. Gastrointestinal: Positive for abdominal pain. Negative for blood in stool and nausea. Genitourinary: Negative. Musculoskeletal: Negative. Skin: Negative. Neurological: Negative. Negative for dizziness, syncope, weakness and light-headedness. Hematological: Negative. Psychiatric/Behavioral: Negative. Physical Examination:    BP (!) 153/100   Pulse 69   Temp 97.9 °F (36.6 °C) (Oral)   Resp 20   Ht 5' 8\" (1.727 m)   Wt 218 lb (98.9 kg)   SpO2 99%   BMI 33.15 kg/m²    Physical Exam   Constitutional: He appears healthy. No distress. HENT:   Normal cephalic and Atraumatic   Eyes: Pupils are equal, round, and reactive to light. Neck: Thyroid normal. No JVD present. No neck adenopathy. No thyromegaly present. Cardiovascular: Normal rate, regular rhythm, normal heart sounds, intact distal pulses and normal pulses. Pulmonary/Chest: Effort normal and breath sounds normal. He has no wheezes. He has no rales. He exhibits no tenderness. Abdominal: Soft. Bowel sounds are normal. There is no abdominal tenderness. Musculoskeletal:         General: No tenderness or edema. Normal range of motion. Cervical back: Normal range of motion and neck supple.    Neurological: He is alert and oriented to person, place, and time. Skin: Skin is warm. No cyanosis. Nails show no clubbing.        LABS:  CBC:   Lab Results   Component Value Date    WBC 11.1 08/05/2021    RBC 4.44 08/05/2021    RBC 4.61 01/15/2019    HGB 13.6 08/05/2021    HCT 41.1 08/05/2021    MCV 92.7 08/05/2021    MCH 30.6 08/05/2021    MCHC 33.0 08/05/2021    RDW 14.6 08/05/2021     08/05/2021    MPV 10.9 01/15/2019     CBC with Differential:    Lab Results   Component Value Date    WBC 11.1 08/05/2021    RBC 4.44 08/05/2021    RBC 4.61 01/15/2019    HGB 13.6 08/05/2021    HCT 41.1 08/05/2021     08/05/2021    MCV 92.7 08/05/2021    MCH 30.6 08/05/2021    MCHC 33.0 08/05/2021    RDW 14.6 08/05/2021    NRBC 0.0 01/15/2019    LYMPHOPCT 24.0 08/05/2021    MONOPCT 6.9 08/05/2021    BASOPCT 0.4 08/05/2021    MONOSABS 0.8 08/05/2021    LYMPHSABS 2.7 08/05/2021    EOSABS 0.1 08/05/2021    BASOSABS 0.0 08/05/2021     CMP:    Lab Results   Component Value Date     08/04/2021    K 3.6 08/04/2021     08/04/2021    CO2 22 08/04/2021    BUN 17 08/04/2021    CREATININE 0.91 08/04/2021    GFRAA >60.0 08/04/2021    LABGLOM >60.0 08/04/2021    GLUCOSE 132 08/04/2021    GLUCOSE 153 01/14/2019    PROT 6.3 08/04/2021    LABALBU 4.1 08/04/2021    CALCIUM 8.8 08/04/2021    BILITOT <0.2 08/04/2021    ALKPHOS 82 08/04/2021    AST 19 08/04/2021    ALT 18 08/04/2021     BMP:    Lab Results   Component Value Date     08/04/2021    K 3.6 08/04/2021     08/04/2021    CO2 22 08/04/2021    BUN 17 08/04/2021    LABALBU 4.1 08/04/2021    CREATININE 0.91 08/04/2021    CALCIUM 8.8 08/04/2021    GFRAA >60.0 08/04/2021    LABGLOM >60.0 08/04/2021    GLUCOSE 132 08/04/2021    GLUCOSE 153 01/14/2019     Magnesium:    Lab Results   Component Value Date    MG 2.1 08/03/2021     Troponin:    Lab Results   Component Value Date    TROPONINI <0.010 08/03/2021        Active Hospital Problems    Diagnosis Date Noted    [facility] [J44.1] 08/04/2021     Priority: Low    Chest pain [R07.9] 08/03/2021     Priority: Low    HTN (hypertension) [I10] 08/03/2021     Priority: Low    HLD (hyperlipidemia) [E78.5] 08/03/2021     Priority: Low    Abnormal CT scan, chest [R93.89] 08/03/2021     Priority: Low    COPD (chronic obstructive pulmonary disease) ([address] [J44.9]      Priority: Low        Assessment/Plan:  1. CP- no ACS  2. Abnormal CT - Pulmonary note reviewed. 3. Echo - EF 60% no Pericardial Effusion. 4. Drug abuser  5. HTN uncontrolled- advance ARB to bid.  -  Stable        Electronically signed by Teo Villarreal MD on 8/5/2021 at 10:02 AM

## 2021-08-06 VITALS
RESPIRATION RATE: 18 BRPM | BODY MASS INDEX: 33.34 KG/M2 | HEIGHT: 68 IN | WEIGHT: 220 LBS | HEART RATE: 86 BPM | DIASTOLIC BLOOD PRESSURE: 84 MMHG | SYSTOLIC BLOOD PRESSURE: 145 MMHG | TEMPERATURE: 97.3 F | OXYGEN SATURATION: 98 %

## 2021-08-06 LAB
BASOPHILS ABSOLUTE: 0 K/UL (ref 0–0.2)
BASOPHILS RELATIVE PERCENT: 0.1 %
EOSINOPHILS ABSOLUTE: 0 K/UL (ref 0–0.7)
EOSINOPHILS RELATIVE PERCENT: 0 %
HCT VFR BLD CALC: 43.8 % (ref 42–52)
HEMOGLOBIN: 14.3 G/DL (ref 14–18)
LYMPHOCYTES ABSOLUTE: 1.1 K/UL (ref 1–4.8)
LYMPHOCYTES RELATIVE PERCENT: 7.8 %
MCH RBC QN AUTO: 30.8 PG (ref 27–31.3)
MCHC RBC AUTO-ENTMCNC: 32.8 % (ref 33–37)
MCV RBC AUTO: 93.9 FL (ref 80–100)
MONOCYTES ABSOLUTE: 0.3 K/UL (ref 0.2–0.8)
MONOCYTES RELATIVE PERCENT: 2.3 %
NEUTROPHILS ABSOLUTE: 12.8 K/UL (ref 1.4–6.5)
NEUTROPHILS RELATIVE PERCENT: 89.8 %
PDW BLD-RTO: 14.4 % (ref 11.5–14.5)
PLATELET # BLD: 228 K/UL (ref 130–400)
RBC # BLD: 4.67 M/UL (ref 4.7–6.1)
WBC # BLD: 14.3 K/UL (ref 4.8–10.8)

## 2021-08-06 PROCEDURE — 94664 DEMO&/EVAL PT USE INHALER: CPT

## 2021-08-06 PROCEDURE — 6370000000 HC RX 637 (ALT 250 FOR IP): Performed by: INTERNAL MEDICINE

## 2021-08-06 PROCEDURE — 99232 SBSQ HOSP IP/OBS MODERATE 35: CPT | Performed by: INTERNAL MEDICINE

## 2021-08-06 PROCEDURE — 6360000002 HC RX W HCPCS: Performed by: NURSE PRACTITIONER

## 2021-08-06 PROCEDURE — 2580000003 HC RX 258: Performed by: NURSE PRACTITIONER

## 2021-08-06 PROCEDURE — 94640 AIRWAY INHALATION TREATMENT: CPT

## 2021-08-06 PROCEDURE — 36415 COLL VENOUS BLD VENIPUNCTURE: CPT

## 2021-08-06 PROCEDURE — 6360000002 HC RX W HCPCS: Performed by: INTERNAL MEDICINE

## 2021-08-06 PROCEDURE — 94761 N-INVAS EAR/PLS OXIMETRY MLT: CPT

## 2021-08-06 PROCEDURE — 85025 COMPLETE CBC W/AUTO DIFF WBC: CPT

## 2021-08-06 PROCEDURE — 2580000003 HC RX 258: Performed by: INTERNAL MEDICINE

## 2021-08-06 RX ORDER — IPRATROPIUM BROMIDE AND ALBUTEROL SULFATE 2.5; .5 MG/3ML; MG/3ML
1 SOLUTION RESPIRATORY (INHALATION) 3 TIMES DAILY
Status: DISCONTINUED | OUTPATIENT
Start: 2021-08-06 | End: 2021-08-06 | Stop reason: HOSPADM

## 2021-08-06 RX ORDER — PREDNISONE 10 MG/1
TABLET ORAL
Qty: 30 TABLET | Refills: 0 | Status: SHIPPED | OUTPATIENT
Start: 2021-08-06 | End: 2021-10-20 | Stop reason: ALTCHOICE

## 2021-08-06 RX ORDER — CEFUROXIME AXETIL 500 MG/1
500 TABLET ORAL 2 TIMES DAILY
Qty: 10 TABLET | Refills: 0 | Status: SHIPPED | OUTPATIENT
Start: 2021-08-06 | End: 2021-08-11

## 2021-08-06 RX ORDER — NICOTINE 21 MG/24HR
1 PATCH, TRANSDERMAL 24 HOURS TRANSDERMAL DAILY
Qty: 30 PATCH | Refills: 3 | Status: SHIPPED | OUTPATIENT
Start: 2021-08-07

## 2021-08-06 RX ORDER — AZITHROMYCIN 500 MG/1
500 TABLET, FILM COATED ORAL DAILY
Qty: 5 TABLET | Refills: 0 | Status: SHIPPED | OUTPATIENT
Start: 2021-08-06 | End: 2021-08-11

## 2021-08-06 RX ORDER — ISOSORBIDE MONONITRATE 30 MG/1
30 TABLET, EXTENDED RELEASE ORAL DAILY
Qty: 30 TABLET | Refills: 3 | Status: SHIPPED | OUTPATIENT
Start: 2021-08-07

## 2021-08-06 RX ORDER — ASPIRIN 81 MG/1
81 TABLET ORAL DAILY
Qty: 30 TABLET | Refills: 3 | Status: SHIPPED | OUTPATIENT
Start: 2021-08-07

## 2021-08-06 RX ADMIN — LOSARTAN POTASSIUM 100 MG: 50 TABLET, FILM COATED ORAL at 09:20

## 2021-08-06 RX ADMIN — METHYLPREDNISOLONE SODIUM SUCCINATE 40 MG: 40 INJECTION, POWDER, FOR SOLUTION INTRAMUSCULAR; INTRAVENOUS at 11:27

## 2021-08-06 RX ADMIN — ASPIRIN 81 MG: 81 TABLET, COATED ORAL at 09:21

## 2021-08-06 RX ADMIN — Medication 30 MG: at 02:05

## 2021-08-06 RX ADMIN — GUAIFENESIN 600 MG: 600 TABLET ORAL at 09:20

## 2021-08-06 RX ADMIN — Medication 30 MG: at 11:32

## 2021-08-06 RX ADMIN — IPRATROPIUM BROMIDE AND ALBUTEROL SULFATE 1 AMPULE: .5; 3 SOLUTION RESPIRATORY (INHALATION) at 07:12

## 2021-08-06 RX ADMIN — Medication 10 ML: at 09:22

## 2021-08-06 RX ADMIN — ISOSORBIDE MONONITRATE 30 MG: 30 TABLET, EXTENDED RELEASE ORAL at 09:21

## 2021-08-06 RX ADMIN — CEFTRIAXONE SODIUM 1000 MG: 1 INJECTION, POWDER, FOR SOLUTION INTRAMUSCULAR; INTRAVENOUS at 09:22

## 2021-08-06 RX ADMIN — IPRATROPIUM BROMIDE AND ALBUTEROL SULFATE 1 AMPULE: .5; 3 SOLUTION RESPIRATORY (INHALATION) at 12:16

## 2021-08-06 RX ADMIN — ENOXAPARIN SODIUM 40 MG: 40 INJECTION SUBCUTANEOUS at 09:21

## 2021-08-06 RX ADMIN — PANTOPRAZOLE SODIUM 40 MG: 40 TABLET, DELAYED RELEASE ORAL at 05:04

## 2021-08-06 RX ADMIN — METHYLPREDNISOLONE SODIUM SUCCINATE 40 MG: 40 INJECTION, POWDER, FOR SOLUTION INTRAMUSCULAR; INTRAVENOUS at 02:05

## 2021-08-06 RX ADMIN — AZITHROMYCIN MONOHYDRATE 500 MG: 500 INJECTION, POWDER, LYOPHILIZED, FOR SOLUTION INTRAVENOUS at 11:27

## 2021-08-06 RX ADMIN — HYDRALAZINE HYDROCHLORIDE 100 MG: 100 TABLET, FILM COATED ORAL at 09:20

## 2021-08-06 ASSESSMENT — ENCOUNTER SYMPTOMS
STRIDOR: 0
WHEEZING: 0
NAUSEA: 0
CHEST TIGHTNESS: 0
BLOOD IN STOOL: 0
RESPIRATORY NEGATIVE: 1
ABDOMINAL PAIN: 1
SHORTNESS OF BREATH: 0
EYES NEGATIVE: 1
COUGH: 0

## 2021-08-06 NOTE — PROGRESS NOTES
CLINICAL PHARMACY NOTE: MEDS TO BEDS    Total # of Prescriptions Filled: 6   The following medications were delivered to the patient:  · Nicotine 21 mg/24 hr pt  · isosorb mono er 30 mg tab  · azithromycin 500 mg tab  · Prednisone 10 mg  · Cefuroxime axetil 500 mg tab  · Adult aspirin 81 mg tb    Additional Documentation:

## 2021-08-06 NOTE — PROGRESS NOTES
INPATIENT PROGRESS NOTES    PATIENT NAME: Colleen Ortiz  MRN: [id number]  SERVICE DATE:  2021   SERVICE TIME:  11:45 AM      PRIMARY SERVICE: Pulmonary Disease    CHIEF COMPLAINTS: COPD    INTERVAL HPI: Patient seen and examined at bedside, Interval Notes, orders reviewed. Nursing notes noted    Patient sitting up in the chair this morning, states that this a.m. the same thing happened again patient started coughing and was very short of breath. Received a respiratory treatment and started to feel better. Patient is currently on room air sats are 96%. No fever overnight, has a nonproductive cough. No edema no nausea no vomiting. Review of system:     GI Abdominal pain No  Skin Rash No    Social History     Tobacco Use    Smoking status: Current Every Day Smoker     Packs/day: 1.00     Years: 30.00     Pack years: 30.00     Last attempt to quit: 1/10/2019     Years since quittin.5    Smokeless tobacco: Former User     Quit date: 1988   Substance Use Topics    Alcohol use: Yes     Comment: socially     History reviewed. No pertinent family history. OBJECTIVE    Body mass index is 33.45 kg/m². PHYSICAL EXAM:  Vitals:  BP (!) 145/84   Pulse 74   Temp 97.3 °F (36.3 °C)   Resp 16   Ht 5' 8\" (1.727 m)   Wt 220 lb (99.8 kg)   SpO2 96%   BMI 33.45 kg/m²     General: alert, cooperative  Head: normocephalic, atraumatic  Eyes:No gross abnormalities. ENT:  MMM no lesions  Neck:  supple and no masses  Chest : Fair air movement, occasional expiratory wheezes, no rhonchi no rales  Heart[de-identified] Heart sounds are normal.  Regular rate and rhythm without murmur, gallop or rub. ABD:  bowel sounds normal, soft, non-tender  Musculoskeletal : no cyanosis, no clubbing and no edema  Neuro:  Grossly normal  Skin: No rashes or nodules noted.   Lymph node:  no cervical nodes  Urology: No Cooley   Psychiatric: appropriate    DATA:   Recent Labs     21  0616 21  0606   WBC 11.1* 14.3*   HGB 13.6* 14.3   HCT 41.1* 43.8   MCV 92.7 93.9    228     Recent Labs     08/04/21  0707      K 3.6      CO2 22   BUN 17   CREATININE 0.91   GLUCOSE 132*   CALCIUM 8.8   PROT 6.3   LABALBU 4.1   BILITOT <0.2   ALKPHOS 82   AST 19   ALT 18   LABGLOM >60.0   GFRAA >60.0   GLOB 2.2*       MV Settings:          No results for input(s): PHART, FBE7DJN, PO2ART, PDZ1BUM, BEART, O8IMBXVL in the last 72 hours. O2 Device: None (Room air)  O2 Flow Rate (L/min): 2 L/min    ADULT DIET; Regular;  No Added Salt (3-4 gm)     MEDICATIONS during current hospitalization:    Continuous Infusions:   sodium chloride         Scheduled Meds:   ipratropium-albuterol  1 ampule Inhalation TID    methylPREDNISolone  40 mg Intravenous Q8H    cefTRIAXone (ROCEPHIN) IV  1,000 mg Intravenous Q24H    azithromycin  500 mg Intravenous Q24H    dextromethorphan  30 mg Oral Q8H    losartan  100 mg Oral BID    sodium chloride flush  5-40 mL Intravenous 2 times per day    enoxaparin  40 mg Subcutaneous Daily    hydrALAZINE  100 mg Oral 2 times per day    aspirin  81 mg Oral Daily    isosorbide mononitrate  30 mg Oral Daily    nicotine  1 patch Transdermal Daily    guaiFENesin  600 mg Oral BID    pantoprazole  40 mg Oral QAM AC       PRN Meds:sodium chloride flush, sodium chloride, ondansetron **OR** ondansetron, polyethylene glycol, acetaminophen **OR** acetaminophen, ipratropium-albuterol, hydrALAZINE    Radiology  Echocardiogram complete 2D with doppler with color    Result Date: 8/4/2021  Transthoracic Echocardiography Report (TTE)  Demographics   Patient Name   Cindy Venegas  Gender               Male                 R   Patient Number [id number]         Race                                                    Ethnicity   Visit Number   [id number]        Room Number          O981   Corporate ID                    Date of Study        08/03/2021   Accession      [id number]       Referring Physician  Number   Date of excursion. No evidence of mitral regurgitation. No evidence of mitral valve stenosis. Tricuspid Valve Normal tricuspid valve structure and function. There is Trace tricuspid regurgitation with estimated RVSP of 20 mm Hg. Aortic Valve Structurally normal aortic valve. No evidence of aortic valve regurgitation . No evidence of aortic valve stenosis. Pulmonic Valve Normal pulmonic valve structure and function. Pericardial Effusion No evidence of pericardial effusion. Pleural Effusion No evidence of pleural effusion. Aorta \ Miscellaneous Miscellaneous normal findings were found. M-Mode Measurements (cm)   LVIDd: 4.31 cm                         LVIDs: 3.22 cm  IVSd: 1.35 cm                          IVSs: 1.46 cm  LVPWd: 1.36 cm                         LVPWs: 1.82 cm  Rt. Vent.  Dimension: 2.63 cm           AO Root Dimension: 2.66 cm                                          LVOT: 1.87 cm  Doppler Measurements:   AV Velocity:0.02 m/s                    MV Peak E-Wave: 0.97 m/s  AV Peak Gradient: 10.99 mmHg            MV Peak A-Wave: 0.87 m/s  AV Mean Gradient: 6.78 mmHg  AV Area (Continuity):1.56 cm^2  TR Velocity:1.62 m/s                    Estimated RAP:10 mmHg  TR Gradient:10.43 mmHg                  RVSP:20.43 mmHg  Valves  Mitral Valve   Peak E-Wave: 0.97 m/s           Peak A-Wave: 0.87 m/s                                  E/A Ratio: 1.11                                  Peak Gradient: 3.75 mmHg                                  Deceleration Time: 217.7 msec   Aortic Valve   Peak Velocity: 1.66 m/s                Mean Velocity: 1.26 m/s  Peak Gradient: 10.99 mmHg              Mean Gradient: 6.78 mmHg  Area (continuity): 1.56 cm^2  AV VTI: 33.68 cm   Tricuspid Valve   Estimated RVSP: 20.43 mmHg              Estimated RAP: 10 mmHg  TR Velocity: 1.62 m/s                   TR Gradient: 10.43 mmHg   Pulmonic Valve   Peak Velocity: 0.95 m/s           Peak Gradient: 3.57 mmHg                                    Estimated PASP: 20.43 mmHg   LVOT   Peak Velocity: 1.14 m/s              Mean Velocity: 0.78 m/s  Peak Gradient: 4.75 mmHg             Mean Gradient: 2.75 mmHg  LVOT Diameter: 1.87 cm               LVOT VTI: 19.1 cm  Structures  Left Atrium   LA Volume/Index: 54.51 ml /26 m^2             LA Area: 18.73 cm^2   Left Ventricle   Diastolic Dimension: 5.49 cm        Systolic Dimension: 9.16 cm  Septum Diastolic: 9.29 cm           Septum Systolic: 2.12 cm  PW Diastolic: 8.80 cm               PW Systolic: 2.28 cm                                      FS: 25.3 %  LV EDV/LV EDV Index: 83.3 ml/39 m^2 LV ESV/LV ESV Index: 41.67 ml/20 m^2  EF Calculated: 50 %   LVOT Diameter: 1.87 cm   Right Atrium   RA Systolic Pressure: 10 mmHg   Right Ventricle   Diastolic Dimension: 8.68 cm                                    RV Systolic Pressure: 79.17 mmHg  Aorta/ Miscellaneous Aorta   Aortic Root: 2.66 cm  LVOT Diameter: 1.87 cm      CTA Chest W WO  (PE study)    Result Date: 8/3/2021  CT PULMONARY ANGIOGRAM WITH INTRAVENOUS CONTRAST MEDIUM. REASON FOR EXAMINATION:  SHORTNESS OF BREATH, CHEST PAIN TECHNIQUE: Helical CTA was performed through the chest utilizing 100 ml of Isovue-370 intravenous contrast.  Images were obtained with bolus tracking in order to opacify the pulmonary arteries. Thick section coronal MIP 3D reconstructions were performed  on a separate workstation. COMPARISON:none FINDINGS:  Pulmonary arteries: No intraluminal filling defects. Thoracic aorta: Normal in course and caliber. Cardiac Size: Normal. Pericardial effusion: 3.7 x 2.6 cm area decreased attenuation, adjacent to dependent posterior pericardium (series 2, image 82). Right lung: No nodules, masses, pleural effusion, pneumothorax. Dependent subsegmental consolidation right lower lobe, right middle lobe. Left lung: No nodules, masses, pleural effusion, pneumothorax. Dependent subsegmental consolidation left lower lobe, left upper lobe.  Lymph nodes: No hilar, mediastinal, or axillary lymph node enlargement. Upper abdomen:Limited imaging upper abdomen shows 1 mm cystlike area, left hepatic lobe, Hounsfield measurement -7 to +12. Musculoskeletal:No osteoblastic, and no osteolytic lesions. . Large bridging anterior osteophytes thoracic spine. No CT evidence pulmonary embolism. 3.7 x 2.6 cm triangular shaped area decreased attenuation having dependent posterior pericardium. Finding may represent pericardial effusion. Clinical correlation required All CT scans at this facility use dose modulation, iterative reconstruction, and/or weight based dosing when appropriate to reduce radiation dose to as low as reasonably achievable. Most recent    Chest CT      WITH CONTRAST:No results found for this or any previous visit. WITHOUT CONTRAST: No results found for this or any previous visit. CXR      2-view: Results for orders placed in visit on 05/03/18    XR CHEST STANDARD (2 VW)    Narrative  DIAGNOSIS:R06.02 SOB (shortness of breath) ICD10    COMPARISON: March 1, 2010    TECHNIQUE: PA and lateral chest    FINDINGS: The lungs contain no focal infiltrate, pleural effusion, or consolidation or pneumothorax. The cardiac silhouette is not enlarged. Degenerative changes are seen in the dorsal spine. .    Impression  No acute cardiopulmonary process       Portable: No results found for this or any previous visit. Echo No results found for this or any previous visit.           IMPRESSION AND SUGGESTION:  Patient is at risk due to   · 3.7 x 2.6 cm triangular-shaped density near posterior pericardium rule out etiology  · Subsegmental atelectasis versus infiltration in the right middle lower lobe and left lower lobe  · COPD with exacerbation  · History of DVT and PE, current CT chest negative for PE  · Tobacco use, 2 packs/day  · Hypertension    Recommendations  · Follow-up CT chest in 2-3 weeks,  · Smoking cessation  · PFT as outpatient  · Once discharged will need a follow-up with pulmonary in 3 weeks  · Ok to discharge home, on oral antibiotics and prednisone taper           Electronically signed by RODERICK Molina CNP,

## 2021-08-06 NOTE — DISCHARGE SUMMARY
Hospital Medicine Discharge Summary    Elmer Bass  :  1970  MRN:  [id number]    Admit date:  8/3/2021  Discharge date:  2021    Admitting Physician:  Lydia Fuller DO  Primary Care Physician:  Grace Dong MD      Discharge Diagnoses:      Chest pain-rule out ACS  Possible pericardial effusion  mild COPD exacerbation  Tobacco use   GERD  Abnormal CT scan of the chest-3.7 x 2.6 triangular-shaped density near the posterior pericardium, rule out malignancy versus pneumonia       Chief Complaint   Patient presents with    Shortness of Breath    Chest Pain     Hospital Course:       Patient is a 65-year-old male with a vesicle history of COPD, tobacco use, GERD who presented to hospital with shortness of breath and chest pain. He was seen by cardiologist.  ACS was ruled out. He was treated with inhaled treatments and steroids in addition to antibiotics. Pulmonary medicine was consulted. He was treated for pneumonia. There was initially suspected possible pericardial effusion but this was ruled out on echocardiogram.  Initially seen on CT scan. A CT scan showed 3.7 x 2.6 triangular-shaped density near the posterior pericardium. This was suspected to be pneumonia. However malignancy cannot be ruled out. Patient will need repeat imaging in 3 to 4 weeks. Patient was given instruction to follow-up with pulmonary medicine and PCP and cardiology as outpatient. Further imaging is needed as previously mentioned. Plan of care was explained to the patient. Patient verbalized understanding. Exam on discharge:   BP (!) 145/84   Pulse 86   Temp 97.3 °F (36.3 °C)   Resp 18   Ht 5' 8\" (1.727 m)   Wt 220 lb (99.8 kg)   SpO2 98%   BMI 33.45 kg/m²   General appearance: No apparent distress, appears stated age and cooperative. HEENT: Pupils equal, round, and reactive to light. Conjunctivae/corneas clear. Neck: Supple, with full range of motion. No jugular venous distention.  Trachea midline. Respiratory:  Normal respiratory effort. Clear to auscultation, bilaterally without Rales/Wheezes/Rhonchi. Cardiovascular: Regular rate and rhythm with normal S1/S2 without murmurs, rubs or gallops. Abdomen: Soft, non-tender, non-distended with normal bowel sounds. Musculoskeletal: No clubbing, cyanosis or edema bilaterally. Full range of motion without deformity. Skin: Skin color, texture, turgor normal.  No rashes or lesions. Neuro: Non Focal. Symetrical motor and tone. Nl Comprehension, Alert,awake and oriented. NL CN. Symetrical tone and reflexes. Psychiatric: Alert and oriented, thought content appropriate, normal insight  Capillary Refill: Brisk,< 3 seconds   Peripheral Pulses: +2 palpable, equal bilaterally     Patient was seen by the following consultants while admitted to [facility]:   Consults:  [address] TO PULMONOLOGY    Significant Diagnostic Studies:    Refer to chart     Please refer to chart if no studies are shown here    Echocardiogram complete 2D with doppler with color    Result Date: 8/4/2021  Transthoracic Echocardiography Report (TTE)  Demographics   Patient Name   Yasmin Garcia  Gender               Male                 R   Patient Number [id number]         Race                                                    Ethnicity   Visit Number   [id number]        Room Number          P774   Corporate ID                    Date of Study        08/03/2021   Accession      [id number]       Referring Physician  Number   Date of Birth  1970       Sonographer          Jeffery Perez   Age            54 year(s)       Interpreting         [facility])                                  Physician            Cardiology                                                       Roseann SEGOVIA DO  Procedure Type of Study   TTE procedure:ECHO COMPLETE 2D W/DOP W/COLOR.   Procedure Date Date: 08/03/2021 Start: 11:08 AM Study Location: Portable Technical Quality: Adequate visualization Indications:Pericardial effusion. Patient Status: Routine Height: 68 inches Weight: 218 pounds BSA: 2.12 m^2 BMI: 33.15 kg/m^2 BP: 171/99 mmHg  Conclusions   Summary  Normal left ventricular systolic function, no regional wall motion  abnormalities, estimated ejection fraction of 60%. Normal left ventricular size and function. Mild concentric left ventricular hypertrophy. Impaired relaxation compatible with diastolic dysfunction. ( reversed E/A  ratio)  No evidence of mitral regurgitation. No evidence of mitral valve stenosis. No evidence of aortic valve regurgitation . No evidence of aortic valve stenosis. There is Trace tricuspid regurgitation with estimated RVSP of 20 mm Hg. Signature   ----------------------------------------------------------------  Electronically signed by Colin Ely DO(Interpreting  physician) on 08/04/2021 07:21 AM  ----------------------------------------------------------------   Findings  Left Ventricle Normal left ventricular systolic function, no regional wall motion abnormalities, estimated ejection fraction of 60%. Normal left ventricular size and function. Mild concentric left ventricular hypertrophy. Impaired relaxation compatible with diastolic dysfunction. ( reversed E/A ratio) Right Ventricle Normal right ventricle structure and function. Normal right ventricle systolic pressure. Left Atrium Normal left atrium. Right Atrium Normal right atrium. Mitral Valve Diffusely thickened and pliable mitral valve leaflets with normal excursion. No evidence of mitral regurgitation. No evidence of mitral valve stenosis. Tricuspid Valve Normal tricuspid valve structure and function. There is Trace tricuspid regurgitation with estimated RVSP of 20 mm Hg. Aortic Valve Structurally normal aortic valve. No evidence of aortic valve regurgitation . No evidence of aortic valve stenosis. Pulmonic Valve Normal pulmonic valve structure and function. Pericardial Effusion No evidence of pericardial effusion. Pleural Effusion No evidence of pleural effusion. Aorta \ Miscellaneous Miscellaneous normal findings were found. M-Mode Measurements (cm)   LVIDd: 4.31 cm                         LVIDs: 3.22 cm  IVSd: 1.35 cm                          IVSs: 1.46 cm  LVPWd: 1.36 cm                         LVPWs: 1.82 cm  Rt. Vent.  Dimension: 2.63 cm           AO Root Dimension: 2.66 cm                                          LVOT: 1.87 cm  Doppler Measurements:   AV Velocity:0.02 m/s                    MV Peak E-Wave: 0.97 m/s  AV Peak Gradient: 10.99 mmHg            MV Peak A-Wave: 0.87 m/s  AV Mean Gradient: 6.78 mmHg  AV Area (Continuity):1.56 cm^2  TR Velocity:1.62 m/s                    Estimated RAP:10 mmHg  TR Gradient:10.43 mmHg                  RVSP:20.43 mmHg  Valves  Mitral Valve   Peak E-Wave: 0.97 m/s           Peak A-Wave: 0.87 m/s                                  E/A Ratio: 1.11                                  Peak Gradient: 3.75 mmHg                                  Deceleration Time: 217.7 msec   Aortic Valve   Peak Velocity: 1.66 m/s                Mean Velocity: 1.26 m/s  Peak Gradient: 10.99 mmHg              Mean Gradient: 6.78 mmHg  Area (continuity): 1.56 cm^2  AV VTI: 33.68 cm   Tricuspid Valve   Estimated RVSP: 20.43 mmHg              Estimated RAP: 10 mmHg  TR Velocity: 1.62 m/s                   TR Gradient: 10.43 mmHg   Pulmonic Valve   Peak Velocity: 0.95 m/s           Peak Gradient: 3.57 mmHg                                    Estimated PASP: 20.43 mmHg   LVOT   Peak Velocity: 1.14 m/s              Mean Velocity: 0.78 m/s  Peak Gradient: 4.75 mmHg             Mean Gradient: 2.75 mmHg  LVOT Diameter: 1.87 cm               LVOT VTI: 19.1 cm  Structures  Left Atrium   LA Volume/Index: 54.51 ml /26 m^2             LA Area: 18.73 cm^2   Left Ventricle   Diastolic Dimension: 7.01 cm        Systolic Dimension: 4.10 cm  Septum Diastolic: 6.67 cm may represent pericardial effusion. Clinical correlation required All CT scans at this facility use dose modulation, iterative reconstruction, and/or weight based dosing when appropriate to reduce radiation dose to as low as reasonably achievable. Discharge Medications:       Harish Fraga   Home Medication Instructions ELQ:[id number]    Printed on:08/06/21 8801   Medication Information                      albuterol sulfate HFA (VENTOLIN HFA) 108 (90 Base) MCG/ACT inhaler  Inhale 2 puffs into the lungs every 6 hours as needed for Wheezing             aspirin 81 MG EC tablet  Take 1 tablet by mouth daily             azithromycin (ZITHROMAX) 500 MG tablet  Take 1 tablet by mouth daily for 5 days             cefUROXime (CEFTIN) 500 MG tablet  Take 1 tablet by mouth 2 times daily for 5 days             EPINEPHrine (EPIPEN) 0.3 MG/0.3ML SOAJ injection  Inject 0.3 mLs into the muscle once for 1 dose             ibuprofen (ADVIL;MOTRIN) 800 MG tablet  Take 1 tablet by mouth 2 times daily as needed for Pain             isosorbide mononitrate (IMDUR) 30 MG extended release tablet  Take 1 tablet by mouth daily             losartan (COZAAR) 100 MG tablet  TAKE 1 TABLET BY MOUTH DAILY             nicotine (NICODERM CQ) 21 MG/24HR  Place 1 patch onto the skin daily             predniSONE (DELTASONE) 10 MG tablet  40 mg x3 days then, 30 mg x3 days then, 20 mg x3 days then, 10 mg x3 days             traZODone (DESYREL) 150 MG tablet  Take 1 tablet by mouth nightly as needed for Sleep                 Disposition:   If discharged to Home, Any [facility] AT [facility] needs that were indicated and/or required as been addressed and set up by Social Work. Condition at discharge: good     Activity: activity as tolerated    Total time taken for discharging this patient: 40 minutes. Greater than 70% of time was spent focused exclusively on this patient.  Time was taken to review chart, discuss plans with consultants, reconciling medications, discussing plan answering questions with patient.      TessaLachosusannah Sy DO  8/6/2021, 12:23 PM  ----------------------------------------------------------------------------------------------------------------------    Elmer Bass,

## 2021-08-06 NOTE — PROGRESS NOTES
Progress Note  Patient: Anam Recio  Unit/Bed: U207/C249-58  YOB: 1970  MRN: [id number]  Acct: [de-identified]   Admitting Diagnosis: Chest pain [R07.9]  COPD exacerbation ([facility] [J44.1]  Admit Date:  8/3/2021  Hospital Day: 2    Chief Complaint: CP    Histories:  Past Medical History:   Diagnosis Date    COPD (chronic obstructive pulmonary disease) ([facility]     Exertional dyspnea 3/9/2012    Other chest pain     Personal history of DVT (deep vein thrombosis) 3/9/2012    Personal history of pulmonary embolism 3/9/2012    Smoking 3/9/2012     Past Surgical History:   Procedure Laterality Date    HAND SURGERY      right hand     History reviewed. No pertinent family history. Social History     Socioeconomic History    Marital status:      Spouse name: None    Number of children: None    Years of education: None    Highest education level: None   Occupational History    None   Tobacco Use    Smoking status: Current Every Day Smoker     Packs/day: 1.00     Years: 30.00     Pack years: 30.00     Last attempt to quit: 1/10/2019     Years since quittin.5    Smokeless tobacco: Former User     Quit date: 1988   Substance and Sexual Activity    Alcohol use: Yes     Comment: socially    Drug use: No    Sexual activity: Yes   Other Topics Concern    None   Social History Narrative    None     Social Determinants of Health     Financial Resource Strain:     Difficulty of Paying Living Expenses:    Food Insecurity:     Worried About Running Out of Food in the Last Year:     [address] in the Last Year:    Transportation Needs:     Lack of Transportation (Medical):      Lack of Transportation (Non-Medical):    Physical Activity:     Days of Exercise per Week:     Minutes of Exercise per Session:    Stress:     Feeling of Stress :    Social Connections:     Frequency of Communication with Friends and Family:     Frequency of Social Gatherings with Friends and Family:     Attends Faith Services:     Active Member of Clubs or Organizations:     Attends Club or Organization Meetings:     Marital Status:    Intimate Partner Violence:     Fear of Current or Ex-Partner:     Emotionally Abused:     Physically Abused:     Sexually Abused:        Subjective/HPI  Coughing more today. Feels a little worse today. CP with Cough. EKG: SR 60-70         Review of Systems:   Review of Systems   Constitutional: Negative. Negative for diaphoresis and fatigue. HENT: Negative. Eyes: Negative. Respiratory: Negative. Negative for cough, chest tightness, shortness of breath, wheezing and stridor. Cardiovascular: Negative. Negative for chest pain, palpitations and leg swelling. Gastrointestinal: Positive for abdominal pain. Negative for blood in stool and nausea. Genitourinary: Negative. Musculoskeletal: Negative. Skin: Negative. Neurological: Negative. Negative for dizziness, syncope, weakness and light-headedness. Hematological: Negative. Psychiatric/Behavioral: Negative. Physical Examination:    BP (!) 149/92   Pulse 74   Temp 98.1 °F (36.7 °C) (Oral)   Resp 18   Ht 5' 8\" (1.727 m)   Wt 220 lb (99.8 kg)   SpO2 99%   BMI 33.45 kg/m²    Physical Exam   Constitutional: He appears healthy. No distress. HENT:   Normal cephalic and Atraumatic   Eyes: Pupils are equal, round, and reactive to light. Neck: Thyroid normal. No JVD present. No neck adenopathy. No thyromegaly present. Cardiovascular: Normal rate, regular rhythm, normal heart sounds, intact distal pulses and normal pulses. Pulmonary/Chest: Effort normal and breath sounds normal. He has no wheezes. He has no rales. He exhibits no tenderness. Abdominal: Soft. Bowel sounds are normal. There is no abdominal tenderness. Musculoskeletal:         General: No tenderness or edema. Normal range of motion. Cervical back: Normal range of motion and neck supple.    Neurological: He is alert and oriented to person, place, and time. Skin: Skin is warm. No cyanosis. Nails show no clubbing.        LABS:  CBC:   Lab Results   Component Value Date    WBC 14.3 08/06/2021    RBC 4.67 08/06/2021    RBC 4.61 01/15/2019    HGB 14.3 08/06/2021    HCT 43.8 08/06/2021    MCV 93.9 08/06/2021    MCH 30.8 08/06/2021    MCHC 32.8 08/06/2021    RDW 14.4 08/06/2021     08/06/2021    MPV 10.9 01/15/2019     CBC with Differential:    Lab Results   Component Value Date    WBC 14.3 08/06/2021    RBC 4.67 08/06/2021    RBC 4.61 01/15/2019    HGB 14.3 08/06/2021    HCT 43.8 08/06/2021     08/06/2021    MCV 93.9 08/06/2021    MCH 30.8 08/06/2021    MCHC 32.8 08/06/2021    RDW 14.4 08/06/2021    NRBC 0.0 01/15/2019    LYMPHOPCT 7.8 08/06/2021    MONOPCT 2.3 08/06/2021    BASOPCT 0.1 08/06/2021    MONOSABS 0.3 08/06/2021    LYMPHSABS 1.1 08/06/2021    EOSABS 0.0 08/06/2021    BASOSABS 0.0 08/06/2021     CMP:    Lab Results   Component Value Date     08/04/2021    K 3.6 08/04/2021     08/04/2021    CO2 22 08/04/2021    BUN 17 08/04/2021    CREATININE 0.91 08/04/2021    GFRAA >60.0 08/04/2021    LABGLOM >60.0 08/04/2021    GLUCOSE 132 08/04/2021    GLUCOSE 153 01/14/2019    PROT 6.3 08/04/2021    LABALBU 4.1 08/04/2021    CALCIUM 8.8 08/04/2021    BILITOT <0.2 08/04/2021    ALKPHOS 82 08/04/2021    AST 19 08/04/2021    ALT 18 08/04/2021     BMP:    Lab Results   Component Value Date     08/04/2021    K 3.6 08/04/2021     08/04/2021    CO2 22 08/04/2021    BUN 17 08/04/2021    LABALBU 4.1 08/04/2021    CREATININE 0.91 08/04/2021    CALCIUM 8.8 08/04/2021    GFRAA >60.0 08/04/2021    LABGLOM >60.0 08/04/2021    GLUCOSE 132 08/04/2021    GLUCOSE 153 01/14/2019     Magnesium:    Lab Results   Component Value Date    MG 2.1 08/03/2021     Troponin:    Lab Results   Component Value Date    TROPONINI <0.010 08/03/2021        Active Hospital Problems    Diagnosis Date Noted    Atelectasis of both lungs [J98.11]      Priority: Low    COPD exacerbation ([address] [J44.1] 08/04/2021     Priority: Low    Chest pain [R07.9] 08/03/2021     Priority: Low    HTN (hypertension) [I10] 08/03/2021     Priority: Low    HLD (hyperlipidemia) [E78.5] 08/03/2021     Priority: Low    Abnormal CT scan, chest [R93.89] 08/03/2021     Priority: Low    COPD (chronic obstructive pulmonary disease) ([facility] [J44.9]      Priority: Low        Assessment/Plan:  1. CP- no ACS  2. Abnormal CT - Pulmonary note reviewed. 3. Echo - EF 60% no Pericardial Effusion. 4. Drug abuser  5. HTN uncontrolled- advance ARB to bid. -  Stable   6. OK for dc from CV point.         Electronically signed by Calista Woodruff MD on 8/6/2021 at 7:16 AM

## 2021-08-06 NOTE — PROGRESS NOTES
[facility] EMERGENCY [facility] AT [place] Respiratory Therapy Evaluation   Current Order:  Carly Willams q4wa      Home Regimen: prn      Ordering Physician: Guera Kat  Re-evaluation Date:  8/9     Diagnosis: chest pain      Patient Status: Stable / Unstable + Physician notified    The following MDI Criteria must be met in order to convert aerosol to MDI with spacer. If unable to meet, MDI will be converted to aerosol:  []  Patient able to demonstrate the ability to use MDI effectively  []  Patient alert and cooperative  []  Patient able to take deep breath with 5-10 second hold  []  Medication(s) available in this delivery method   []  Peak flow greater than or equal to 200 ml/min            Current Order Substituted To  (same drug, same frequency)   Aerosol to MDI [] Albuterol Sulfate 0.083% unit dose by aerosol Albuterol Sulfate MDI 2 puffs by inhalation with spacer    [] Levalbuterol 1.25 mg unit dose by aerosol Levalbuterol MDI 2 puffs by inhalation with spacer    [] Levalbuterol 0.63 mg unit dose by aerosol Levalbuterol MDI 2 puffs by inhalation with spacer    [] Ipratropium Bromide 0.02% unit dose by aerosol Ipratropium Bromide MDI 2 puffs by inhalation with spacer    [] Duoneb (Ipratropium + Albuterol) unit dose by aerosol Ipratropium MDI + Albuterol MDI 2 puffs by inhalation w/spacer   MDI to Aerosol [] Albuterol Sulfate MDI Albuterol Sulfate 0.083% unit dose by aerosol    [] Levalbuterol MDI 2 puffs by inhalation Levalbuterol 1.25 mg unit dose by aerosol    [] Ipratropium Bromide MDI by inhalation Ipratropium Bromide 0.02% unit dose by aerosol    [] Combivent (Ipratropium + Albuterol) MDI by inhalation Duoneb (Ipratropium + Albuterol) unit dose by aerosol       Treatment Assessment [Frequency/Schedule]:  Change frequency to: __________duoneb tid________________________________________per Protocol, P&T, MEC      Points 0 1 2 3 4   Pulmonary Status  Non-Smoker  []   Smoking history   < 20 pack years  []   Smoking history  ?  21 pack years  []   Pulmonary Disorder  (acute or chronic)  [x]   Severe or Chronic w/ Exacerbation  []     Surgical Status No [x]   Surgeries     General []   Surgery Lower []   Abdominal Thoracic or []   Upper Abdominal Thoracic with  PulmonaryDisorder  []     Chest X-ray Clear/Not  Ordered     [x]  Chronic Changes  Results Pending  []  Infiltrates, atelectasis, pleural effusion, or edema  []  Infiltrates in more than one lobe []  Infiltrate + Atelectasis, &/or pleural effusion  []    Respiratory Pattern Regular,  RR = 12-20 [x]  Increased,  RR = 21-25 []  [place], irregular,  or RR = 26-30 []  Decreased FEV1  or RR = 31-35 []  Severe SOB, use  of accessory muscles, or RR ? 35  []    Mental Status Alert, oriented,  Cooperative [x]  Confused but Follows commands []  Lethargic or unable to follow commands []  Obtunded  []  Comatose  []    Breath Sounds Clear to  auscultation  []  Decreased unilaterally or  in bases only []  Decreased  bilaterally  []  Crackles or intermittent wheezes []  Wheezes [x]    Cough Strong, Spontan., & nonproductive [x]  Strong,  spontaneous, &  productive []  Weak,  Nonproductive []  Weak, productive or  with wheezes []  No spontaneous  cough or may require suctioning []    Level of Activity Ambulatory [x]  Ambulatory w/ Assist  []  Non-ambulatory []  Paraplegic []  Quadriplegic []    Total    Score:___7____     Triage Score:____5____      Tri       Triage:     1. (>20) Freq: Q3    2. (16-20) Freq: Q4   3. (11-15) Freq: QID & Albuterol Q2 PRN    4. (6-10) Freq: TID & Albuterol Q2 PRN    5. (0-5) Freq Q4prn

## 2021-08-08 LAB
BLOOD CULTURE, ROUTINE: NORMAL
CULTURE, BLOOD 2: NORMAL

## 2021-08-09 ENCOUNTER — TELEPHONE (OUTPATIENT)
Dept: PRIMARY CARE CLINIC | Age: 51
End: 2021-08-09

## 2021-08-09 ENCOUNTER — CARE COORDINATION (OUTPATIENT)
Dept: CASE MANAGEMENT | Age: 51
End: 2021-08-09

## 2021-08-09 DIAGNOSIS — J44.1 COPD EXACERBATION (HCC): Primary | ICD-10-CM

## 2021-08-09 NOTE — TELEPHONE ENCOUNTER
[facility] Transitions Initial Follow Up Call    Outreach made within 2 business days of discharge: No    Patient: Ruben Chavez Patient : 1970   MRN: [id number]  Reason for Admission: There are no discharge diagnoses documented for the most recent discharge. Discharge Date: 21       Spoke with: FITZ    Discharge department/facility: [facility] Interactive Patient Contact:  Was patient able to fill all prescriptions: Yes  Was patient instructed to bring all medications to the follow-up visit: Yes  Is patient taking all medications as directed in the discharge summary?  Yes  Does patient understand their discharge instructions: Yes  Does patient have questions or concerns that need addressed prior to 7-14 day follow up office visit: no    Scheduled appointment with PCP within 7-14 days    Follow Up  Future Appointments   Date Time Provider [place]   2021  2:45 PM Meme Ledezma, APRN - [address]   2021 10:00 AM Johnny Florez MD [address] [place], [place]

## 2021-08-09 NOTE — CARE COORDINATION
Lila [facility] Transitions Initial Follow Up Call    Call within 2 business days of discharge: Yes    Patient: Clarence Benitez Patient : 1970   MRN: [id number]  Reason for Admission: 8/3/2021 - 2021 COPD, PN. Discharge Date: 21 RARS: Readmission Risk Score: 8  NR  CT    Last Discharge [facility]       Complaint Diagnosis Description Type Department Provider    8/3/21 Shortness of Breath; Chest Pain Chest pain, unspecified type . .. ED to Hosp-Admission (Discharged) (ADMITTED) [facility]; [address] .. Yennifer Love DO (Family Medicine) on 2021; 8AM. Pt canceled. PCP  10:00                  Micaela Hussein NP  2:45    Transitions of Care Initial Call    Was this an external facility discharge? No Discharge Facility:     Challenges to be reviewed by the provider   Additional needs identified to be addressed with provider: No  none             Method of communication with provider : none      Advance Care Planning:   Does patient have an Advance Directive: reviewed and current. Was this a readmission? No  Patient stated reason for admission: Metro Patrick reports occasional cough producing yellow mucus. States no exertional SOB, fevers, chills, or flu-like symptoms. States he has not smoked since last Friday and continues to abstain. Reviewed creating trigger plan, v/u. Is changing pharmacy and once he decides on which one he wants he will notify PCP office. He is checking w/ ins. Will need refills on losartan & albuterol inhaler soon. In good spirits. Denies any home needs. Patients top risk factors for readmission: medical condition-Smoking cessation, COPD, PN. Care Transition Nurse (CTN) contacted the patient by telephone to perform post hospital discharge assessment. Verified name and  with patient as identifiers. Provided introduction to self, and explanation of the CTN role.      CTN reviewed discharge instructions, medical action plan and red flags with patient who verbalized understanding. Patient given an opportunity to ask questions and does not have any further questions or concerns at this time. Were discharge instructions available to patient? Yes. Reviewed appropriate site of care based on symptoms and resources available to patient including: When to call 911. The patient agrees to contact the PCP office for questions related to their healthcare. Medication reconciliation was performed with patient, who verbalizes understanding of administration of home medications. Advised obtaining a 90-day supply of all daily and as-needed medications. CTN provided contact information. Plan for follow-up call in 5-7 days based on severity of symptoms and risk factors.   Plan for next call: symptom management-COPD/PN fu    Care Transitions 24 Hour Call    Do you have any ongoing symptoms?: Yes  Patient-reported symptoms: Cough  Do you have a copy of your discharge instructions?: Yes  Do you have all of your prescriptions and are they filled?: Yes  Have you scheduled your follow up appointment?: Yes  Were you discharged with any Home Care or Post Acute Services: No  Do you feel like you have everything you need to keep you well at home?: Yes  Care Transitions Interventions         Follow Up  Future Appointments   Date Time Provider Johnson Kirby   8/16/2021  2:45 PM Priti Morales   8/25/2021 10:00 AM Dilshad Thorne MD [address] [place] [address]

## 2021-08-16 ENCOUNTER — OFFICE VISIT (OUTPATIENT)
Dept: PULMONOLOGY | Age: 51
End: 2021-08-16
Payer: COMMERCIAL

## 2021-08-16 VITALS
HEIGHT: 68 IN | OXYGEN SATURATION: 96 % | HEART RATE: 80 BPM | TEMPERATURE: 98.6 F | SYSTOLIC BLOOD PRESSURE: 138 MMHG | BODY MASS INDEX: 33.34 KG/M2 | WEIGHT: 220 LBS | DIASTOLIC BLOOD PRESSURE: 86 MMHG

## 2021-08-16 DIAGNOSIS — J44.9 CHRONIC OBSTRUCTIVE PULMONARY DISEASE, UNSPECIFIED COPD TYPE (HCC): Primary | ICD-10-CM

## 2021-08-16 DIAGNOSIS — R06.02 SOB (SHORTNESS OF BREATH): ICD-10-CM

## 2021-08-16 DIAGNOSIS — I10 ESSENTIAL HYPERTENSION: ICD-10-CM

## 2021-08-16 DIAGNOSIS — F17.200 SMOKER: ICD-10-CM

## 2021-08-16 PROCEDURE — 1111F DSCHRG MED/CURRENT MED MERGE: CPT | Performed by: NURSE PRACTITIONER

## 2021-08-16 PROCEDURE — G8417 CALC BMI ABV UP PARAM F/U: HCPCS | Performed by: NURSE PRACTITIONER

## 2021-08-16 PROCEDURE — 99212 OFFICE O/P EST SF 10 MIN: CPT | Performed by: NURSE PRACTITIONER

## 2021-08-16 PROCEDURE — G8926 SPIRO NO PERF OR DOC: HCPCS | Performed by: NURSE PRACTITIONER

## 2021-08-16 PROCEDURE — G8427 DOCREV CUR MEDS BY ELIG CLIN: HCPCS | Performed by: NURSE PRACTITIONER

## 2021-08-16 PROCEDURE — 1036F TOBACCO NON-USER: CPT | Performed by: NURSE PRACTITIONER

## 2021-08-16 PROCEDURE — 3023F SPIROM DOC REV: CPT | Performed by: NURSE PRACTITIONER

## 2021-08-16 RX ORDER — EPINEPHRINE 0.3 MG/.3ML
0.3 INJECTION SUBCUTANEOUS ONCE
Qty: 2 EACH | Refills: 2 | Status: SHIPPED | OUTPATIENT
Start: 2021-08-16 | End: 2021-08-16

## 2021-08-16 RX ORDER — ALBUTEROL SULFATE 90 UG/1
2 AEROSOL, METERED RESPIRATORY (INHALATION) EVERY 6 HOURS PRN
Qty: 18 G | Refills: 5 | Status: SHIPPED | OUTPATIENT
Start: 2021-08-16

## 2021-08-16 RX ORDER — IBUPROFEN 800 MG/1
800 TABLET ORAL 2 TIMES DAILY PRN
Qty: 60 TABLET | Refills: 1 | Status: SHIPPED | OUTPATIENT
Start: 2021-08-16

## 2021-08-16 RX ORDER — TRAZODONE HYDROCHLORIDE 150 MG/1
150 TABLET ORAL NIGHTLY PRN
Qty: 14 TABLET | Refills: 0 | Status: SHIPPED | OUTPATIENT
Start: 2021-08-16

## 2021-08-16 RX ORDER — LOSARTAN POTASSIUM 100 MG/1
100 TABLET ORAL DAILY
Qty: 15 TABLET | Refills: 0 | Status: SHIPPED | OUTPATIENT
Start: 2021-08-16

## 2021-08-16 ASSESSMENT — ENCOUNTER SYMPTOMS
WHEEZING: 0
CHEST TIGHTNESS: 0
VOMITING: 0
NAUSEA: 0
APNEA: 0
ABDOMINAL PAIN: 0
ABDOMINAL DISTENTION: 0
EYES NEGATIVE: 1
SHORTNESS OF BREATH: 0
STRIDOR: 0
COUGH: 0
CHOKING: 0

## 2021-08-18 ENCOUNTER — CARE COORDINATION (OUTPATIENT)
Dept: CASE MANAGEMENT | Age: 51
End: 2021-08-18

## 2021-08-18 NOTE — CARE COORDINATION
Care Transitions Outreach Attempt    Call within 2 business days of discharge: No   Attempted to reach patient for transitions of care follow up. Unable to reach patient. Patient: Nataly Dowd Patient : 1970 MRN: <[id number]>    Last Discharge [facility]       Complaint Diagnosis Description Type Department Provider    8/3/21 Shortness of Breath; Chest Pain Chest pain, unspecified type . .. ED to Hosp-Admission (Discharged) (ADMITTED) [facility] [facility]; Gracelyn Oppenheim,... Attempted to contact patient for Transition Subsequent call. Left HIPAA Compliant message on Voice Mail to call office (number given) with any questions and an update on patient's condition since discharge. Will continue to follow. Chava Bhatti LPN    [phone]  [facility] Coordinator    Was this an external facility discharge?  No Discharge Facility:     Noted following upcoming appointments from discharge chart review:   [facility] follow up appointment(s):   Future Appointments   Date Time Provider Johnson Kirby   2021 11:00 AM [place] [address]   2021 12:00 PM [place] CT ROOM 1 [facility]LOZ CT [facility] Fac RAD   2021 10:00 AM Yoshi Dean  [place]   9/3/2021  1:00 PM Michelle Solomon  [facility]   2021  1:00 PM Darren Robertson APRN - CNP [facility] follow up appointment(s):

## 2021-08-20 ENCOUNTER — HOSPITAL ENCOUNTER (OUTPATIENT)
Dept: CT IMAGING | Age: 51
Discharge: HOME | End: 2021-08-22
Payer: COMMERCIAL

## 2021-08-20 ENCOUNTER — HOSPITAL ENCOUNTER (OUTPATIENT)
Dept: PULMONOLOGY | Age: 51
Discharge: HOME | End: 2021-08-20
Payer: COMMERCIAL

## 2021-08-20 DIAGNOSIS — F17.200 SMOKING: ICD-10-CM

## 2021-08-20 DIAGNOSIS — R91.1 LUNG NODULE: ICD-10-CM

## 2021-08-20 DIAGNOSIS — R93.89 ABNORMAL CT SCAN, CHEST: ICD-10-CM

## 2021-08-20 DIAGNOSIS — J44.9 CHRONIC OBSTRUCTIVE PULMONARY DISEASE, UNSPECIFIED COPD TYPE (HCC): ICD-10-CM

## 2021-08-20 PROCEDURE — 6360000002 HC RX W HCPCS: Performed by: NURSE PRACTITIONER

## 2021-08-20 PROCEDURE — 94726 PLETHYSMOGRAPHY LUNG VOLUMES: CPT

## 2021-08-20 PROCEDURE — 94060 EVALUATION OF WHEEZING: CPT

## 2021-08-20 PROCEDURE — 71250 CT THORAX DX C-: CPT

## 2021-08-20 PROCEDURE — 94729 DIFFUSING CAPACITY: CPT

## 2021-08-20 RX ORDER — ALBUTEROL SULFATE 2.5 MG/3ML
2.5 SOLUTION RESPIRATORY (INHALATION) ONCE
Status: COMPLETED | OUTPATIENT
Start: 2021-08-20 | End: 2021-08-20

## 2021-08-20 RX ADMIN — ALBUTEROL SULFATE 2.5 MG: 2.5 SOLUTION RESPIRATORY (INHALATION) at 10:48

## 2021-08-23 PROCEDURE — 94726 PLETHYSMOGRAPHY LUNG VOLUMES: CPT | Performed by: INTERNAL MEDICINE

## 2021-08-23 PROCEDURE — 94060 EVALUATION OF WHEEZING: CPT | Performed by: INTERNAL MEDICINE

## 2021-08-23 PROCEDURE — 94729 DIFFUSING CAPACITY: CPT | Performed by: INTERNAL MEDICINE

## 2021-08-26 ENCOUNTER — CARE COORDINATION (OUTPATIENT)
Dept: CASE MANAGEMENT | Age: 51
End: 2021-08-26

## 2021-08-26 NOTE — CARE COORDINATION
Care Transitions Outreach Attempt    Call within 2 business days of discharge: No   Attempted to reach patient for transitions of care follow up. Unable to reach patient. Patient: Helga Zaragoza Patient : 1970 MRN: <[id number]>    Last Discharge [facility]       Complaint Diagnosis Description Type Department Provider    8/3/21 Shortness of Breath; Chest Pain Chest pain, unspecified type . .. ED to Hosp-Admission (Discharged) (ADMITTED) [address] [facility], ; Sheldon Gomez,... Attempted to contact patient for Transition Subsequent call. Left HIPAA Compliant message on Voice Mail to call office (number given) with any questions and an update on patient's condition since discharge. Will continue to follow. Jevon Mullins LPN    [phone]  [facility] [place] [address] Coordinator    Was this an external facility discharge?  No Discharge Facility:     Noted following upcoming appointments from discharge chart review:   [facility] follow up appointment(s):   Future Appointments   Date Time Provider Johnson Kirby   9/3/2021  1:00 PM Joaquín Torrez MD [address] [place] [address]   2021  1:00 PM RODERICK Allen - CNP [facility] SURGICAL [facility]     Non-[facility] follow up appointment(s):

## 2021-08-31 ENCOUNTER — CARE COORDINATION (OUTPATIENT)
Dept: CASE MANAGEMENT | Age: 51
End: 2021-08-31

## 2021-08-31 NOTE — CARE COORDINATION
Care Transitions Outreach Attempt    Call within 2 business days of discharge: No   Attempted to reach patient for transitions of care follow up. Unable to reach patient. Patient: Jakob Andres Patient : 1970 MRN: <[id number]>    Last Discharge [facility]       Complaint Diagnosis Description Type Department Provider    8/3/21 Shortness of Breath; Chest Pain Chest pain, unspecified type . .. ED to Hosp-Admission (Discharged) (ADMITTED) [facility], ; Shea Mcnally,... Attempted to contact patient for Transition Subsequent call. Left HIPAA Compliant message on Voice Mail to call office (number given) with any questions and an update on patient's condition since discharge. This is the Final Transition Call. Non Reached X 3 time. Was this an external facility discharge?  No Discharge Facility:     Noted following upcoming appointments from discharge chart review:   [facility] follow up appointment(s):   Future Appointments   Date Time Provider Johnson Kirby   9/3/2021  1:00 PM Huy Brown MD [facility]   2021  1:00 PM RODERICK Tucker - CNP [facility]     Non-[facility] follow up appointment(s):

## 2021-10-20 ENCOUNTER — APPOINTMENT (OUTPATIENT)
Dept: CT IMAGING | Age: 51
End: 2021-10-20
Payer: COMMERCIAL

## 2021-10-20 ENCOUNTER — HOSPITAL ENCOUNTER (EMERGENCY)
Age: 51
Discharge: HOME | End: 2021-10-20
Attending: STUDENT IN AN ORGANIZED HEALTH CARE EDUCATION/TRAINING PROGRAM
Payer: COMMERCIAL

## 2021-10-20 VITALS
SYSTOLIC BLOOD PRESSURE: 146 MMHG | RESPIRATION RATE: 22 BRPM | HEIGHT: 68 IN | BODY MASS INDEX: 33.34 KG/M2 | HEART RATE: 64 BPM | WEIGHT: 220 LBS | TEMPERATURE: 98.7 F | DIASTOLIC BLOOD PRESSURE: 109 MMHG | OXYGEN SATURATION: 98 %

## 2021-10-20 DIAGNOSIS — J44.1 ACUTE EXACERBATION OF CHRONIC OBSTRUCTIVE PULMONARY DISEASE (COPD) (HCC): Primary | ICD-10-CM

## 2021-10-20 DIAGNOSIS — R07.89 CHEST WALL PAIN: ICD-10-CM

## 2021-10-20 LAB
ALBUMIN SERPL-MCNC: 4.3 G/DL (ref 3.5–4.6)
ALP BLD-CCNC: 96 U/L (ref 35–104)
ALT SERPL-CCNC: 23 U/L (ref 0–41)
ANION GAP SERPL CALCULATED.3IONS-SCNC: 14 MEQ/L (ref 9–15)
APTT: 32.2 SEC (ref 24.4–36.8)
AST SERPL-CCNC: 21 U/L (ref 0–40)
BASOPHILS ABSOLUTE: 0 K/UL (ref 0–0.2)
BASOPHILS RELATIVE PERCENT: 0.4 %
BILIRUB SERPL-MCNC: 0.4 MG/DL (ref 0.2–0.7)
BUN BLDV-MCNC: 15 MG/DL (ref 6–20)
C-REACTIVE PROTEIN, HIGH SENSITIVITY: 0.9 MG/L (ref 0–5)
CALCIUM SERPL-MCNC: 8.8 MG/DL (ref 8.5–9.9)
CHLORIDE BLD-SCNC: 103 MEQ/L (ref 95–107)
CK MB: 4.5 NG/ML (ref 0–6.7)
CO2: 21 MEQ/L (ref 20–31)
CREAT SERPL-MCNC: 0.77 MG/DL (ref 0.7–1.2)
CREATINE KINASE-MB INDEX: 1.4 % (ref 0–3.5)
EOSINOPHILS ABSOLUTE: 0.4 K/UL (ref 0–0.7)
EOSINOPHILS RELATIVE PERCENT: 4.4 %
GFR AFRICAN AMERICAN: >60
GFR AFRICAN AMERICAN: >60
GFR NON-AFRICAN AMERICAN: >60
GFR NON-AFRICAN AMERICAN: >60
GLOBULIN: 2.1 G/DL (ref 2.3–3.5)
GLUCOSE BLD-MCNC: 116 MG/DL (ref 70–99)
HCT VFR BLD CALC: 44.6 % (ref 42–52)
HEMOGLOBIN: 15.1 G/DL (ref 14–18)
INR BLD: 1
LACTIC ACID: 1.5 MMOL/L (ref 0.5–2.2)
LYMPHOCYTES ABSOLUTE: 2.2 K/UL (ref 1–4.8)
LYMPHOCYTES RELATIVE PERCENT: 26.2 %
MAGNESIUM: 2.1 MG/DL (ref 1.7–2.4)
MCH RBC QN AUTO: 31.5 PG (ref 27–31.3)
MCHC RBC AUTO-ENTMCNC: 33.9 % (ref 33–37)
MCV RBC AUTO: 93.1 FL (ref 80–100)
MONOCYTES ABSOLUTE: 0.6 K/UL (ref 0.2–0.8)
MONOCYTES RELATIVE PERCENT: 7.1 %
NEUTROPHILS ABSOLUTE: 5.3 K/UL (ref 1.4–6.5)
NEUTROPHILS RELATIVE PERCENT: 61.9 %
PDW BLD-RTO: 14.1 % (ref 11.5–14.5)
PERFORMED ON: NORMAL
PLATELET # BLD: 301 K/UL (ref 130–400)
POC CREATININE WHOLE BLOOD: 0.9
POC CREATININE: 0.9 MG/DL (ref 0.9–1.3)
POC SAMPLE TYPE: NORMAL
POTASSIUM SERPL-SCNC: 3.9 MEQ/L (ref 3.4–4.9)
PRO-BNP: 39 PG/ML
PROCALCITONIN: 0.06 NG/ML (ref 0–0.15)
PROTHROMBIN TIME: 12.9 SEC (ref 12.3–14.9)
RBC # BLD: 4.79 M/UL (ref 4.7–6.1)
SARS-COV-2, NAAT: NOT DETECTED
SODIUM BLD-SCNC: 138 MEQ/L (ref 135–144)
TOTAL CK: 330 U/L (ref 0–190)
TOTAL PROTEIN: 6.4 G/DL (ref 6.3–8)
TROPONIN: <0.01 NG/ML (ref 0–0.01)
TSH SERPL DL<=0.05 MIU/L-ACNC: 0.54 UIU/ML (ref 0.44–3.86)
WBC # BLD: 8.5 K/UL (ref 4.8–10.8)

## 2021-10-20 PROCEDURE — 6370000000 HC RX 637 (ALT 250 FOR IP): Performed by: STUDENT IN AN ORGANIZED HEALTH CARE EDUCATION/TRAINING PROGRAM

## 2021-10-20 PROCEDURE — 84145 PROCALCITONIN (PCT): CPT

## 2021-10-20 PROCEDURE — 99285 EMERGENCY DEPT VISIT HI MDM: CPT

## 2021-10-20 PROCEDURE — 85730 THROMBOPLASTIN TIME PARTIAL: CPT

## 2021-10-20 PROCEDURE — 84443 ASSAY THYROID STIM HORMONE: CPT

## 2021-10-20 PROCEDURE — 80053 COMPREHEN METABOLIC PANEL: CPT

## 2021-10-20 PROCEDURE — 71275 CT ANGIOGRAPHY CHEST: CPT

## 2021-10-20 PROCEDURE — 96375 TX/PRO/DX INJ NEW DRUG ADDON: CPT

## 2021-10-20 PROCEDURE — 87635 SARS-COV-2 COVID-19 AMP PRB: CPT

## 2021-10-20 PROCEDURE — 36415 COLL VENOUS BLD VENIPUNCTURE: CPT

## 2021-10-20 PROCEDURE — 87040 BLOOD CULTURE FOR BACTERIA: CPT

## 2021-10-20 PROCEDURE — 82553 CREATINE MB FRACTION: CPT

## 2021-10-20 PROCEDURE — 93005 ELECTROCARDIOGRAM TRACING: CPT

## 2021-10-20 PROCEDURE — 94640 AIRWAY INHALATION TREATMENT: CPT

## 2021-10-20 PROCEDURE — 96365 THER/PROPH/DIAG IV INF INIT: CPT

## 2021-10-20 PROCEDURE — 85025 COMPLETE CBC W/AUTO DIFF WBC: CPT

## 2021-10-20 PROCEDURE — 82550 ASSAY OF CK (CPK): CPT

## 2021-10-20 PROCEDURE — 86141 C-REACTIVE PROTEIN HS: CPT

## 2021-10-20 PROCEDURE — 85610 PROTHROMBIN TIME: CPT

## 2021-10-20 PROCEDURE — 6360000002 HC RX W HCPCS: Performed by: STUDENT IN AN ORGANIZED HEALTH CARE EDUCATION/TRAINING PROGRAM

## 2021-10-20 PROCEDURE — 84484 ASSAY OF TROPONIN QUANT: CPT

## 2021-10-20 PROCEDURE — 83605 ASSAY OF LACTIC ACID: CPT

## 2021-10-20 PROCEDURE — 83735 ASSAY OF MAGNESIUM: CPT

## 2021-10-20 PROCEDURE — 6360000004 HC RX CONTRAST MEDICATION: Performed by: STUDENT IN AN ORGANIZED HEALTH CARE EDUCATION/TRAINING PROGRAM

## 2021-10-20 PROCEDURE — 83880 ASSAY OF NATRIURETIC PEPTIDE: CPT

## 2021-10-20 RX ORDER — MAGNESIUM SULFATE IN WATER 40 MG/ML
2000 INJECTION, SOLUTION INTRAVENOUS ONCE
Status: COMPLETED | OUTPATIENT
Start: 2021-10-20 | End: 2021-10-20

## 2021-10-20 RX ORDER — SODIUM CHLORIDE 0.9 % (FLUSH) 0.9 %
10 SYRINGE (ML) INJECTION
Status: DISCONTINUED | OUTPATIENT
Start: 2021-10-20 | End: 2021-10-20 | Stop reason: HOSPADM

## 2021-10-20 RX ORDER — BENZONATATE 200 MG/1
200 CAPSULE ORAL 3 TIMES DAILY PRN
Qty: 21 CAPSULE | Refills: 0 | Status: SHIPPED | OUTPATIENT
Start: 2021-10-20 | End: 2021-10-27

## 2021-10-20 RX ORDER — METHYLPREDNISOLONE SODIUM SUCCINATE 125 MG/2ML
125 INJECTION, POWDER, LYOPHILIZED, FOR SOLUTION INTRAMUSCULAR; INTRAVENOUS ONCE
Status: COMPLETED | OUTPATIENT
Start: 2021-10-20 | End: 2021-10-20

## 2021-10-20 RX ORDER — ASPIRIN 81 MG/1
324 TABLET, CHEWABLE ORAL ONCE
Status: DISCONTINUED | OUTPATIENT
Start: 2021-10-20 | End: 2021-10-20 | Stop reason: HOSPADM

## 2021-10-20 RX ORDER — IPRATROPIUM BROMIDE AND ALBUTEROL SULFATE 2.5; .5 MG/3ML; MG/3ML
1 SOLUTION RESPIRATORY (INHALATION) ONCE
Status: COMPLETED | OUTPATIENT
Start: 2021-10-20 | End: 2021-10-20

## 2021-10-20 RX ORDER — PREDNISONE 50 MG/1
50 TABLET ORAL DAILY
Qty: 5 TABLET | Refills: 0 | Status: SHIPPED | OUTPATIENT
Start: 2021-10-20 | End: 2021-10-25

## 2021-10-20 RX ORDER — ALBUTEROL SULFATE 90 UG/1
AEROSOL, METERED RESPIRATORY (INHALATION)
Qty: 18 G | Refills: 0 | Status: SHIPPED | OUTPATIENT
Start: 2021-10-20

## 2021-10-20 RX ADMIN — METHYLPREDNISOLONE SODIUM SUCCINATE 125 MG: 125 INJECTION, POWDER, FOR SOLUTION INTRAMUSCULAR; INTRAVENOUS at 18:42

## 2021-10-20 RX ADMIN — IPRATROPIUM BROMIDE AND ALBUTEROL SULFATE 1 AMPULE: .5; 2.5 SOLUTION RESPIRATORY (INHALATION) at 18:58

## 2021-10-20 RX ADMIN — MAGNESIUM SULFATE HEPTAHYDRATE 2000 MG: 40 INJECTION, SOLUTION INTRAVENOUS at 16:38

## 2021-10-20 RX ADMIN — IOPAMIDOL 100 ML: 755 INJECTION, SOLUTION INTRAVENOUS at 17:01

## 2021-10-20 ASSESSMENT — ENCOUNTER SYMPTOMS
SINUS PRESSURE: 0
TROUBLE SWALLOWING: 0
DIARRHEA: 0
CHEST TIGHTNESS: 1
SHORTNESS OF BREATH: 1
COUGH: 1
ABDOMINAL PAIN: 0
BACK PAIN: 0
VOMITING: 0

## 2021-10-20 ASSESSMENT — PAIN DESCRIPTION - DESCRIPTORS: DESCRIPTORS: SHARP

## 2021-10-20 ASSESSMENT — PAIN DESCRIPTION - ORIENTATION: ORIENTATION: LEFT;ANTERIOR

## 2021-10-20 ASSESSMENT — PAIN SCALES - GENERAL: PAINLEVEL_OUTOF10: 6

## 2021-10-20 ASSESSMENT — PAIN DESCRIPTION - LOCATION: LOCATION: CHEST

## 2021-10-20 ASSESSMENT — PAIN DESCRIPTION - PAIN TYPE: TYPE: ACUTE PAIN

## 2021-10-20 NOTE — ED PROVIDER NOTES
[address] [facility] ED  eMERGENCY dEPARTMENT eNCOUnter      Pt Name: Ari Soto  MRN: [id number]  Alan 1970  Date of evaluation: 10/20/2021  Provider: Toney Whitehead, [address] [place] [address]       Chief Complaint   Patient presents with    Chest Pain     chest pain and shortness of breath x several weeks         HISTORY OF PRESENT ILLNESS   (Location/Symptom, Timing/Onset,Context/Setting, Quality, Duration, Modifying Factors, Severity)  Note limiting factors. Ari Soto is a 54 y.o. male who presents to the emergency department with c/o SOB worsening over the past 2 to 3 weeks. Patient c/o CP worse with cough and inspiration. Sharp in quality. Patient states feels like last time he had pneumonia. Patient initially denied history of blood clots however chart review shows that not only did he have blood clots but he had a history of pulmonary embolism and also a DVT. Patient did quit smoking August 2 but resumed smoking again out of stress. Patient states states that about a week ago he fell asleep near a campfire and his symptoms really worsened. Patient denies any fever or sweating. Patient denies any nausea vomiting or diarrhea. Patient denies any loss of taste or loss of smell. On physical exam the patient is wheezing and harsh rhonchi. The history is provided by the patient. NursingNotes were reviewed. REVIEW OF SYSTEMS    (2-9 systems for level 4, 10 or more for level 5)     Review of Systems   Constitutional: Negative for activity change, appetite change, chills, fever and unexpected weight change. HENT: Negative for drooling, ear pain, nosebleeds, sinus pressure and trouble swallowing. Respiratory: Positive for cough, chest tightness and shortness of breath. Cardiovascular: Positive for chest pain ( With inspiration). Negative for leg swelling. Gastrointestinal: Negative for abdominal pain, diarrhea and vomiting.    Endocrine: Negative for polydipsia and polyphagia. Genitourinary: Negative for dysuria, flank pain and frequency. Musculoskeletal: Negative for back pain and myalgias. Skin: Negative for pallor and rash. Neurological: Negative for syncope, weakness and headaches. Hematological: Does not bruise/bleed easily. All other systems reviewed and are negative. Except as noted above the remainder of the review of systems was reviewed and negative. PAST MEDICAL HISTORY     Past Medical History:   Diagnosis Date    COPD (chronic obstructive pulmonary disease) ([facility] [address]     Exertional dyspnea 3/9/2012    Other chest pain     Personal history of DVT (deep vein thrombosis) 3/9/2012    Personal history of pulmonary embolism 3/9/2012    Smoking 3/9/2012         SURGICALHISTORY       Past Surgical History:   Procedure Laterality Date    HAND SURGERY      right hand         CURRENT MEDICATIONS       Previous Medications    ALBUTEROL SULFATE HFA (VENTOLIN HFA) 108 (90 BASE) MCG/ACT INHALER    Inhale 2 puffs into the lungs every 6 hours as needed for Wheezing    ASPIRIN 81 MG EC TABLET    Take 1 tablet by mouth daily    EPINEPHRINE (EPIPEN) 0.3 MG/0.3ML SOAJ INJECTION    Inject 0.3 mLs into the muscle once for 1 dose    FLUTICASONE-SALMETEROL (ADVAIR) 250-50 MCG/DOSE AEPB    Inhale 1 puff into the lungs every 12 hours    IBUPROFEN (ADVIL;MOTRIN) 800 MG TABLET    Take 1 tablet by mouth 2 times daily as needed for Pain    ISOSORBIDE MONONITRATE (IMDUR) 30 MG EXTENDED RELEASE TABLET    Take 1 tablet by mouth daily    LOSARTAN (COZAAR) 100 MG TABLET    Take 1 tablet by mouth daily    NICOTINE (NICODERM CQ) 21 MG/24HR    Place 1 patch onto the skin daily    TRAZODONE (DESYREL) 150 MG TABLET    Take 1 tablet by mouth nightly as needed for Sleep       ALLERGIES     Bee venom, Lisinopril, and Codeine    FAMILY HISTORY     History reviewed. No pertinent family history.        SOCIAL HISTORY       Social History     Socioeconomic History    Marital status:      Spouse name: None    Number of children: None    Years of education: None    Highest education level: None   Occupational History    None   Tobacco Use    Smoking status: Former Smoker     Packs/day: 1.00     Years: 30.00     Pack years: 30.00     Quit date: 2021     Years since quittin.2    Smokeless tobacco: Former User     Quit date: 1988   Substance and Sexual Activity    Alcohol use: Yes     Comment: socially    Drug use: No    Sexual activity: Yes   Other Topics Concern    None   Social History Narrative    None     Social Determinants of Health     Financial Resource Strain:     Difficulty of Paying Living Expenses:    Food Insecurity:     Worried About Running Out of Food in the Last Year:     [address] in the Last Year:    Transportation Needs:     Lack of Transportation (Medical):  Lack of Transportation (Non-Medical):    Physical Activity:     Days of Exercise per Week:     Minutes of Exercise per Session:    Stress:     Feeling of Stress :    Social Connections:     Frequency of Communication with Friends and Family:     Frequency of Social Gatherings with Friends and Family:     Attends Confucianism Services:     Active Member of Clubs or Organizations:     Attends Club or Organization Meetings:     Marital Status:    Intimate Partner Violence:     Fear of Current or Ex-Partner:     Emotionally Abused:     Physically Abused:     Sexually Abused:        SCREENINGS      @FLOW([id number])@      PHYSICAL EXAM    (up to 7 for level 4, 8 or more for level 5)     ED Triage Vitals [10/20/21 1601]   BP Temp Temp Source Pulse Resp SpO2 Height Weight   132/84 98.7 °F (37.1 °C) Oral 67 22 94 % 5' 8\" (1.727 m) 220 lb (99.8 kg)       Physical Exam  Vitals and nursing note reviewed. Constitutional:       General: He is awake. He is in acute distress ( Due to frequent cough and wheezing). Appearance: Normal appearance.  He is well-developed and normal weight. He is not ill-appearing, toxic-appearing or diaphoretic. Comments: No photophobia. No phonophobia. HENT:      Head: Normocephalic and atraumatic. No Christopher's sign. Right Ear: External ear normal.      Left Ear: External ear normal.      Nose: Nose normal. No congestion or rhinorrhea. Mouth/Throat:      Mouth: Mucous membranes are moist.      Pharynx: Oropharynx is clear. No oropharyngeal exudate or posterior oropharyngeal erythema. Eyes:      General: No scleral icterus. Right eye: No foreign body or discharge. Left eye: No discharge. Extraocular Movements: Extraocular movements intact. Conjunctiva/sclera: Conjunctivae normal.      Left eye: No exudate. Pupils: Pupils are equal, round, and reactive to light. Neck:      Vascular: No JVD. Trachea: No tracheal deviation. Comments: No meningismus. Cardiovascular:      Rate and Rhythm: Normal rate and regular rhythm. Pulses: Normal pulses. Heart sounds: Normal heart sounds. Heart sounds not distant. No murmur heard. No friction rub. No gallop. Pulmonary:      Effort: Pulmonary effort is normal. Tachypnea present. No accessory muscle usage, prolonged expiration or respiratory distress. Breath sounds: No stridor. Examination of the right-middle field reveals wheezing. Examination of the left-middle field reveals wheezing. Examination of the right-lower field reveals wheezing and rhonchi. Examination of the left-lower field reveals wheezing and rhonchi. Wheezing and rhonchi present. No rales. Chest:      Chest wall: No tenderness. Abdominal:      General: Abdomen is flat. Bowel sounds are normal. There is no distension. Palpations: Abdomen is soft. There is no mass. Tenderness: There is no abdominal tenderness. There is no right CVA tenderness, left CVA tenderness, guarding or rebound. Hernia: No hernia is present.    Musculoskeletal:         General: No swelling, tenderness, deformity or signs of injury. Normal range of motion. Cervical back: Normal range of motion and neck supple. No rigidity. Lymphadenopathy:      Head:      Right side of head: No submental adenopathy. Left side of head: No submental adenopathy. Skin:     General: Skin is warm and dry. Capillary Refill: Capillary refill takes less than 2 seconds. Coloration: Skin is not jaundiced or pale. Findings: No bruising, erythema, lesion or rash. Neurological:      General: No focal deficit present. Mental Status: He is alert and oriented to person, place, and time. Mental status is at baseline. Cranial Nerves: No cranial nerve deficit. Sensory: No sensory deficit. Motor: No weakness. Coordination: Coordination normal.      Deep Tendon Reflexes: Reflexes are normal and symmetric. Psychiatric:         Mood and Affect: Mood normal.         Behavior: Behavior normal. Behavior is cooperative. Thought Content: Thought content normal.         Judgment: Judgment normal.         DIAGNOSTIC RESULTS     EKG: All EKG's are interpreted by the Emergency Department Physician who either signs or Co-signsthis chart in the absence of a cardiologist.    EKG: Normal sinus rhythm at 63 bpm.  T wave inversions in room numeral 1 and aVL. There is also T wave inversion in V6. Normal axis. Borderline LVH voltage. QT interval of 430 ms. Same as EKG dated 8/3/2021 at 0 2:28 AM.    RADIOLOGY:   Non-plain filmimages such as CT, Ultrasound and MRI are read by the radiologist. Plain radiographic images are visualized and preliminarily interpreted by the emergency physician with the below findings:        Interpretation per the Radiologist below, if available at the time ofthis note:    CTA CHEST W WO CONTRAST   Final Result      No pulmonary embolism.       Small ground glass opacities of the dependent right lower lobe likely represent atelectasis unless the patient has signs/symptoms of pneumonia. All CT scans at this facility use dose modulation, iterative reconstruction, and/or weight based dosing when appropriate to reduce radiation dose to as low as reasonably achievable. ED BEDSIDE ULTRASOUND:   Performed by ED Physician - none    LABS:  Labs Reviewed   CBC WITH AUTO DIFFERENTIAL - Abnormal; Notable for the following components:       Result Value    MCH 31.5 (*)     All other components within normal limits   CK - Abnormal; Notable for the following components: Total  (*)     All other components within normal limits   COMPREHENSIVE METABOLIC PANEL - Abnormal; Notable for the following components:    Glucose 116 (*)     Globulin 2.1 (*)     All other components within normal limits   POCT CREATININE - URINE - Normal   COVID-19, RAPID   CULTURE, BLOOD 1   CULTURE, BLOOD 2   PROCALCITONIN   APTT   BRAIN NATRIURETIC PEPTIDE   HIGH SENSITIVITY CRP   LACTIC ACID, PLASMA   MAGNESIUM   PROTIME-INR   TROPONIN   TSH WITHOUT REFLEX   CKMB & RELATIVE PERCENT   URINE RT REFLEX TO CULTURE       All other labs were within normal range or not returned as of this dictation. EMERGENCY DEPARTMENT COURSE and DIFFERENTIAL DIAGNOSIS/MDM:   Vitals:    Vitals:    10/20/21 1601 10/20/21 1732 10/20/21 1838   BP: 132/84 (!) 147/96 (!) 146/109   Pulse: 67 58 64   Resp: 22 20 22   Temp: 98.7 °F (37.1 °C)     TempSrc: Oral     SpO2: 94% 96% 96%   Weight: 220 lb (99.8 kg)     Height: 5' 8\" (1.727 m)             MDM   Patient has CP with inspiration, tachypnea and prior Hx of PE and DVT. Patient also smoker. CTA shows no PE. Ground glass infiltrate but negative COVID test and negative Procalcitonin. Patient received aerosolized breathing treatments, and IV steroid. On reexam the findings were discussed with the patient in detail. The findings were discussed with the patient. The patient was invited to return  to the ER if worse symptoms.   The patient verbalized understanding of the care and they have no further questions. CONSULTS:  None    PROCEDURES:  Unless otherwise noted below, none     Procedures    FINAL IMPRESSION      1. Acute exacerbation of chronic obstructive pulmonary disease (COPD) ([address]    2. Chest wall pain          DISPOSITION/PLAN   DISPOSITION Discharge - Pending Orders Complete 10/20/2021 06:28:20 PM      PATIENT REFERRED TO:  Asher Purvis MD  [address] [place] [id number]  [phone]    Call in 1 day      RODERICK Tucker - [address] [place] [address] [facility] [id number] [phone]    Call in 1 day        DISCHARGE MEDICATIONS:  New Prescriptions    ALBUTEROL SULFATE HFA (PROAIR HFA) 108 (90 BASE) MCG/ACT INHALER    2 puffs; Use every 4 hours while awake for 7-10 days then PRN wheezing  Dispense with SPACER and Instruct on use. May sub Ventolin or Proventil as needed per [employer].     BENZONATATE (TESSALON) 200 MG CAPSULE    Take 1 capsule by mouth 3 times daily as needed for Cough    PREDNISONE (DELTASONE) 50 MG TABLET    Take 1 tablet by mouth daily for 5 days          (Please note that portions of this note were completed with a voice recognition program.  Efforts were made to edit the dictations but occasionally words are mis-transcribed.)    Isidra Oneil DO (electronically signed)  Attending Emergency Physician          Isidra Oneil DO  10/20/21 2284

## 2021-10-20 NOTE — ED NOTES
Bed: 11  Expected date:   Expected time:   Means of arrival:   Comments:     [id number] Hailey Aguilera V RN  10/20/21 0501

## 2021-10-20 NOTE — ED TRIAGE NOTES
Patient under police custody, presents with complaints of chest pain and shortness of breath for several weeks, states he was recently treated for pneumonia about three months ago. No distress noted on arrival. Patient alert and oriented. Skin warm and dry.  No distress noted on arrival.

## 2021-10-22 LAB
EKG ATRIAL RATE: 63 BPM
EKG P AXIS: 70 DEGREES
EKG P-R INTERVAL: 148 MS
EKG Q-T INTERVAL: 430 MS
EKG QRS DURATION: 84 MS
EKG QTC CALCULATION (BAZETT): 440 MS
EKG R AXIS: -24 DEGREES
EKG T AXIS: 145 DEGREES
EKG VENTRICULAR RATE: 63 BPM

## 2021-10-25 LAB
BLOOD CULTURE, ROUTINE: NORMAL
CULTURE, BLOOD 2: NORMAL

## 2023-03-16 NOTE — PROGRESS NOTES
Darren Baldwin is a 54 y.o. male who complains today of:     Chief Complaint   Patient presents with    Follow-Up from Hospital     COPD       HPI  Patient is being seen for follow-up from the hospital for COPD. Patient was sent home on prednisone and antibiotics. Patient states he is a couple days left of the low-dose prednisone and antibiotics have been completed. Patient states he is feeling better, he is still short of breath at times and is using his albuterol inhaler 5 or 6 times a week. Patient stop smoking has not had a cigarette since discharge. Patient also changed his diet and has been trying to get out and exercise. Patient has no edema, denies chest pain, no heartburn, no nausea no vomiting. Allergies:  Bee venom, Lisinopril, and Codeine  Past Medical History:   Diagnosis Date    COPD (chronic obstructive pulmonary disease) ([address]     Exertional dyspnea 3/9/2012    Other chest pain     Personal history of DVT (deep vein thrombosis) 3/9/2012    Personal history of pulmonary embolism 3/9/2012    Smoking 3/9/2012     Past Surgical History:   Procedure Laterality Date    HAND SURGERY      right hand     No family history on file.   Social History     Socioeconomic History    Marital status:      Spouse name: Not on file    Number of children: Not on file    Years of education: Not on file    Highest education level: Not on file   Occupational History    Not on file   Tobacco Use    Smoking status: Former Smoker     Packs/day: 1.00     Years: 30.00     Pack years: 30.00     Quit date: 2021     Years since quittin.0    Smokeless tobacco: Former User     Quit date: 1988   Substance and Sexual Activity    Alcohol use: Yes     Comment: socially    Drug use: No    Sexual activity: Yes   Other Topics Concern    Not on file   Social History Narrative    Not on file     Social Determinants of Health     Financial Resource Strain:     Difficulty of Paying Living Expenses:    Food Insecurity:     Worried About Running Out of Food in the Last Year:     [address] in the Last Year:    Transportation Needs:     Lack of Transportation (Medical):  Lack of Transportation (Non-Medical):    Physical Activity:     Days of Exercise per Week:     Minutes of Exercise per Session:    Stress:     Feeling of Stress :    Social Connections:     Frequency of Communication with Friends and Family:     Frequency of Social Gatherings with Friends and Family:     Attends Voodoo Services:     Active Member of Clubs or Organizations:     Attends Club or Organization Meetings:     Marital Status:    Intimate Partner Violence:     Fear of Current or Ex-Partner:     Emotionally Abused:     Physically Abused:     Sexually Abused:          Review of Systems   Constitutional: Negative for chills and fever. HENT: Negative for congestion and postnasal drip. Eyes: Negative. Respiratory: Negative for apnea, cough, choking, chest tightness, shortness of breath, wheezing and stridor. Cardiovascular: Negative for chest pain and leg swelling. Gastrointestinal: Negative for abdominal distention, abdominal pain, nausea and vomiting. Genitourinary: Negative. Skin: Negative. Neurological: Negative for dizziness and weakness. Psychiatric/Behavioral: Negative. Objective:     Vitals:    08/16/21 1435   BP: 138/86   Pulse: 80   Temp: 98.6 °F (37 °C)   SpO2: 96%   Weight: 220 lb (99.8 kg)   Height: 5' 8\" (1.727 m)         Physical Exam  HENT:      Head: Normocephalic. Cardiovascular:      Rate and Rhythm: Normal rate and regular rhythm. Pulmonary:      Effort: Pulmonary effort is normal.      Breath sounds: Normal breath sounds. Abdominal:      General: Abdomen is flat. Bowel sounds are normal.      Palpations: Abdomen is soft. Musculoskeletal:         General: Normal range of motion. Skin:     General: Skin is warm and dry.       Capillary Refill: Jodie Marie appointment + labs    Refill Due:  [facility] to deliver refill on 3/20.    Bessie Salas, Pharmacy Student   [facility]   [phone]     Capillary refill takes less than 2 seconds. Neurological:      Mental Status: He is alert and oriented to person, place, and time. Psychiatric:         Mood and Affect: Mood normal.             Assessment:      Diagnosis Orders   1. Chronic obstructive pulmonary disease, unspecified COPD type ([facility] [address]     2. SOB (shortness of breath)  albuterol sulfate HFA (VENTOLIN HFA) 108 (90 Base) MCG/ACT inhaler   3. Smoker  albuterol sulfate HFA (VENTOLIN HFA) 108 (90 Base) MCG/ACT inhaler   4. Essential hypertension  losartan (COZAAR) 100 MG tablet     · Added Advair  · Continue NicoDerm patches as needed  · Continue albuterol inhaler as needed      Plan:     No orders of the defined types were placed in this encounter. Orders Placed This Encounter   Medications    albuterol sulfate HFA (VENTOLIN HFA) 108 (90 Base) MCG/ACT inhaler     Sig: Inhale 2 puffs into the lungs every 6 hours as needed for Wheezing     Dispense:  18 g     Refill:  5    EPINEPHrine (EPIPEN) 0.3 MG/0.3ML SOAJ injection     Sig: Inject 0.3 mLs into the muscle once for 1 dose     Dispense:  2 each     Refill:  2    ibuprofen (ADVIL;MOTRIN) 800 MG tablet     Sig: Take 1 tablet by mouth 2 times daily as needed for Pain     Dispense:  60 tablet     Refill:  1    losartan (COZAAR) 100 MG tablet     Sig: Take 1 tablet by mouth daily     Dispense:  15 tablet     Refill:  0    traZODone (DESYREL) 150 MG tablet     Sig: Take 1 tablet by mouth nightly as needed for Sleep     Dispense:  14 tablet     Refill:  0    fluticasone-salmeterol (ADVAIR) 250-50 MCG/DOSE AEPB     Sig: Inhale 1 puff into the lungs every 12 hours     Dispense:  60 each     Refill:  3     Patient is still using his rescue inhaler 5 or 6 times a week, so added Advair for patient. Patient has used Advair in the past and stated that it worked well for him. Patient is going for PFT and CT of his chest on the 20th of this month.     Patient does not see his primary care until the 25th, and he was out of some of his medications. Reordered those medications for just 2 weeks worth, explained to patient that he has to go through his primary care for those. He voiced an understanding    Do a follow-up in 6 weeks after his PFT and CT of his chest.    Patient stop smoking has not had a cigarette since discharge. Congratulated and patient has changed his diet and started exercising more. Return in about 6 weeks (around 9/27/2021).       Mindy Chavis, APRDARRICK - CNP

## 2024-12-07 ENCOUNTER — APPOINTMENT (OUTPATIENT)
Dept: CARDIOLOGY | Facility: HOSPITAL | Age: 54
End: 2024-12-07
Payer: COMMERCIAL

## 2024-12-07 ENCOUNTER — APPOINTMENT (OUTPATIENT)
Dept: RADIOLOGY | Facility: HOSPITAL | Age: 54
End: 2024-12-07
Payer: COMMERCIAL

## 2024-12-07 ENCOUNTER — HOSPITAL ENCOUNTER (INPATIENT)
Facility: HOSPITAL | Age: 54
End: 2024-12-07
Attending: EMERGENCY MEDICINE | Admitting: HOSPITALIST
Payer: COMMERCIAL

## 2024-12-07 DIAGNOSIS — R79.89 ELEVATED TROPONIN: ICD-10-CM

## 2024-12-07 DIAGNOSIS — I10 PRIMARY HYPERTENSION: ICD-10-CM

## 2024-12-07 DIAGNOSIS — I21.4 NSTEMI (NON-ST ELEVATED MYOCARDIAL INFARCTION) (MULTI): ICD-10-CM

## 2024-12-07 DIAGNOSIS — J44.1 COPD EXACERBATION (MULTI): ICD-10-CM

## 2024-12-07 DIAGNOSIS — I20.9 ANGINA PECTORIS, UNSPECIFIED: ICD-10-CM

## 2024-12-07 DIAGNOSIS — F17.200 TOBACCO USE DISORDER: ICD-10-CM

## 2024-12-07 DIAGNOSIS — J18.9 PNEUMONIA OF BOTH LOWER LOBES DUE TO INFECTIOUS ORGANISM: ICD-10-CM

## 2024-12-07 DIAGNOSIS — R06.00 DYSPNEA, UNSPECIFIED TYPE: Primary | ICD-10-CM

## 2024-12-07 LAB
ALBUMIN SERPL BCP-MCNC: 4 G/DL (ref 3.4–5)
ALP SERPL-CCNC: 76 U/L (ref 33–120)
ALT SERPL W P-5'-P-CCNC: 19 U/L (ref 10–52)
AMPHETAMINES UR QL SCN: ABNORMAL
ANION GAP SERPL CALC-SCNC: 12 MMOL/L (ref 10–20)
APPEARANCE UR: CLEAR
AST SERPL W P-5'-P-CCNC: 19 U/L (ref 9–39)
ATRIAL RATE: 80 BPM
ATRIAL RATE: 86 BPM
BARBITURATES UR QL SCN: ABNORMAL
BASOPHILS # BLD AUTO: 0.02 X10*3/UL (ref 0–0.1)
BASOPHILS NFR BLD AUTO: 0.2 %
BENZODIAZ UR QL SCN: ABNORMAL
BILIRUB SERPL-MCNC: 0.6 MG/DL (ref 0–1.2)
BILIRUB UR STRIP.AUTO-MCNC: NEGATIVE MG/DL
BNP SERPL-MCNC: 224 PG/ML (ref 0–99)
BUN SERPL-MCNC: 11 MG/DL (ref 6–23)
BZE UR QL SCN: ABNORMAL
CALCIUM SERPL-MCNC: 8.4 MG/DL (ref 8.6–10.3)
CANNABINOIDS UR QL SCN: ABNORMAL
CARDIAC TROPONIN I PNL SERPL HS: 88 NG/L (ref 0–20)
CARDIAC TROPONIN I PNL SERPL HS: 93 NG/L (ref 0–20)
CHLORIDE SERPL-SCNC: 103 MMOL/L (ref 98–107)
CHOLEST SERPL-MCNC: 154 MG/DL (ref 0–199)
CHOLESTEROL/HDL RATIO: 5
CO2 SERPL-SCNC: 26 MMOL/L (ref 21–32)
COLOR UR: YELLOW
CREAT SERPL-MCNC: 0.83 MG/DL (ref 0.5–1.3)
D DIMER PPP FEU-MCNC: 1129 NG/ML FEU
EGFRCR SERPLBLD CKD-EPI 2021: >90 ML/MIN/1.73M*2
EOSINOPHIL # BLD AUTO: 0.06 X10*3/UL (ref 0–0.7)
EOSINOPHIL NFR BLD AUTO: 0.7 %
ERYTHROCYTE [DISTWIDTH] IN BLOOD BY AUTOMATED COUNT: 12.8 % (ref 11.5–14.5)
FENTANYL+NORFENTANYL UR QL SCN: ABNORMAL
FLUAV RNA RESP QL NAA+PROBE: NOT DETECTED
FLUBV RNA RESP QL NAA+PROBE: NOT DETECTED
GLUCOSE BLD MANUAL STRIP-MCNC: 130 MG/DL (ref 74–99)
GLUCOSE SERPL-MCNC: 120 MG/DL (ref 74–99)
GLUCOSE UR STRIP.AUTO-MCNC: NORMAL MG/DL
HCT VFR BLD AUTO: 43 % (ref 41–52)
HDLC SERPL-MCNC: 30.5 MG/DL
HGB BLD-MCNC: 14.7 G/DL (ref 13.5–17.5)
HOLD SPECIMEN: NORMAL
IMM GRANULOCYTES # BLD AUTO: 0.04 X10*3/UL (ref 0–0.7)
IMM GRANULOCYTES NFR BLD AUTO: 0.4 % (ref 0–0.9)
KETONES UR STRIP.AUTO-MCNC: ABNORMAL MG/DL
LACTATE SERPL-SCNC: 1.6 MMOL/L (ref 0.4–2)
LDLC SERPL CALC-MCNC: 103 MG/DL
LEUKOCYTE ESTERASE UR QL STRIP.AUTO: NEGATIVE
LYMPHOCYTES # BLD AUTO: 1.2 X10*3/UL (ref 1.2–4.8)
LYMPHOCYTES NFR BLD AUTO: 13.2 %
MAGNESIUM SERPL-MCNC: 1.73 MG/DL (ref 1.6–2.4)
MCH RBC QN AUTO: 31.1 PG (ref 26–34)
MCHC RBC AUTO-ENTMCNC: 34.2 G/DL (ref 32–36)
MCV RBC AUTO: 91 FL (ref 80–100)
METHADONE UR QL SCN: ABNORMAL
MONOCYTES # BLD AUTO: 0.77 X10*3/UL (ref 0.1–1)
MONOCYTES NFR BLD AUTO: 8.5 %
NEUTROPHILS # BLD AUTO: 6.97 X10*3/UL (ref 1.2–7.7)
NEUTROPHILS NFR BLD AUTO: 77 %
NITRITE UR QL STRIP.AUTO: NEGATIVE
NON HDL CHOLESTEROL: 124 MG/DL (ref 0–149)
NRBC BLD-RTO: 0 /100 WBCS (ref 0–0)
OPIATES UR QL SCN: ABNORMAL
OXYCODONE+OXYMORPHONE UR QL SCN: ABNORMAL
P AXIS: 59 DEGREES
P AXIS: 64 DEGREES
P OFFSET: 185 MS
P OFFSET: 204 MS
P ONSET: 138 MS
P ONSET: 150 MS
PCP UR QL SCN: ABNORMAL
PH UR STRIP.AUTO: 6.5 [PH]
PLATELET # BLD AUTO: 214 X10*3/UL (ref 150–450)
POTASSIUM SERPL-SCNC: 3.7 MMOL/L (ref 3.5–5.3)
PR INTERVAL: 140 MS
PR INTERVAL: 144 MS
PROT SERPL-MCNC: 7.3 G/DL (ref 6.4–8.2)
PROT UR STRIP.AUTO-MCNC: ABNORMAL MG/DL
Q ONSET: 208 MS
Q ONSET: 222 MS
QRS COUNT: 13 BEATS
QRS COUNT: 15 BEATS
QRS DURATION: 102 MS
QRS DURATION: 92 MS
QT INTERVAL: 374 MS
QT INTERVAL: 380 MS
QTC CALCULATION(BAZETT): 438 MS
QTC CALCULATION(BAZETT): 447 MS
QTC FREDERICIA: 418 MS
QTC FREDERICIA: 421 MS
R AXIS: -53 DEGREES
R AXIS: -55 DEGREES
RBC # BLD AUTO: 4.73 X10*6/UL (ref 4.5–5.9)
RBC # UR STRIP.AUTO: NEGATIVE /UL
RBC #/AREA URNS AUTO: NORMAL /HPF
SARS-COV-2 RNA RESP QL NAA+PROBE: NOT DETECTED
SODIUM SERPL-SCNC: 137 MMOL/L (ref 136–145)
SP GR UR STRIP.AUTO: 1.03
T AXIS: 104 DEGREES
T AXIS: 106 DEGREES
T OFFSET: 395 MS
T OFFSET: 412 MS
TRIGL SERPL-MCNC: 101 MG/DL (ref 0–149)
UFH PPP CHRO-ACNC: 0.1 IU/ML
UROBILINOGEN UR STRIP.AUTO-MCNC: ABNORMAL MG/DL
VENTRICULAR RATE: 80 BPM
VENTRICULAR RATE: 86 BPM
VLDL: 20 MG/DL (ref 0–40)
WBC # BLD AUTO: 9.1 X10*3/UL (ref 4.4–11.3)
WBC #/AREA URNS AUTO: NORMAL /HPF

## 2024-12-07 PROCEDURE — 2500000001 HC RX 250 WO HCPCS SELF ADMINISTERED DRUGS (ALT 637 FOR MEDICARE OP): Performed by: HOSPITALIST

## 2024-12-07 PROCEDURE — S4991 NICOTINE PATCH NONLEGEND: HCPCS | Performed by: HOSPITALIST

## 2024-12-07 PROCEDURE — 99223 1ST HOSP IP/OBS HIGH 75: CPT | Performed by: HOSPITALIST

## 2024-12-07 PROCEDURE — 87636 SARSCOV2 & INF A&B AMP PRB: CPT | Performed by: REGISTERED NURSE

## 2024-12-07 PROCEDURE — 36415 COLL VENOUS BLD VENIPUNCTURE: CPT | Performed by: REGISTERED NURSE

## 2024-12-07 PROCEDURE — 83605 ASSAY OF LACTIC ACID: CPT | Performed by: REGISTERED NURSE

## 2024-12-07 PROCEDURE — 71046 X-RAY EXAM CHEST 2 VIEWS: CPT

## 2024-12-07 PROCEDURE — 80349 CANNABINOIDS NATURAL: CPT | Performed by: REGISTERED NURSE

## 2024-12-07 PROCEDURE — 85025 COMPLETE CBC W/AUTO DIFF WBC: CPT | Performed by: REGISTERED NURSE

## 2024-12-07 PROCEDURE — 80053 COMPREHEN METABOLIC PANEL: CPT | Performed by: REGISTERED NURSE

## 2024-12-07 PROCEDURE — 71275 CT ANGIOGRAPHY CHEST: CPT | Performed by: RADIOLOGY

## 2024-12-07 PROCEDURE — 2500000002 HC RX 250 W HCPCS SELF ADMINISTERED DRUGS (ALT 637 FOR MEDICARE OP, ALT 636 FOR OP/ED): Performed by: HOSPITALIST

## 2024-12-07 PROCEDURE — 96372 THER/PROPH/DIAG INJ SC/IM: CPT | Performed by: REGISTERED NURSE

## 2024-12-07 PROCEDURE — 2500000002 HC RX 250 W HCPCS SELF ADMINISTERED DRUGS (ALT 637 FOR MEDICARE OP, ALT 636 FOR OP/ED): Performed by: INTERNAL MEDICINE

## 2024-12-07 PROCEDURE — 93005 ELECTROCARDIOGRAM TRACING: CPT

## 2024-12-07 PROCEDURE — 82947 ASSAY GLUCOSE BLOOD QUANT: CPT

## 2024-12-07 PROCEDURE — 96367 TX/PROPH/DG ADDL SEQ IV INF: CPT

## 2024-12-07 PROCEDURE — 99255 IP/OBS CONSLTJ NEW/EST HI 80: CPT | Performed by: INTERNAL MEDICINE

## 2024-12-07 PROCEDURE — 80307 DRUG TEST PRSMV CHEM ANLYZR: CPT | Performed by: REGISTERED NURSE

## 2024-12-07 PROCEDURE — 2550000001 HC RX 255 CONTRASTS: Performed by: EMERGENCY MEDICINE

## 2024-12-07 PROCEDURE — 84484 ASSAY OF TROPONIN QUANT: CPT | Performed by: REGISTERED NURSE

## 2024-12-07 PROCEDURE — 83718 ASSAY OF LIPOPROTEIN: CPT | Performed by: HOSPITALIST

## 2024-12-07 PROCEDURE — 2500000004 HC RX 250 GENERAL PHARMACY W/ HCPCS (ALT 636 FOR OP/ED): Performed by: REGISTERED NURSE

## 2024-12-07 PROCEDURE — 99285 EMERGENCY DEPT VISIT HI MDM: CPT | Mod: 25 | Performed by: EMERGENCY MEDICINE

## 2024-12-07 PROCEDURE — 2500000001 HC RX 250 WO HCPCS SELF ADMINISTERED DRUGS (ALT 637 FOR MEDICARE OP): Performed by: INTERNAL MEDICINE

## 2024-12-07 PROCEDURE — 85379 FIBRIN DEGRADATION QUANT: CPT | Performed by: REGISTERED NURSE

## 2024-12-07 PROCEDURE — 2500000001 HC RX 250 WO HCPCS SELF ADMINISTERED DRUGS (ALT 637 FOR MEDICARE OP): Performed by: REGISTERED NURSE

## 2024-12-07 PROCEDURE — 87040 BLOOD CULTURE FOR BACTERIA: CPT | Mod: ELYLAB | Performed by: REGISTERED NURSE

## 2024-12-07 PROCEDURE — 94640 AIRWAY INHALATION TREATMENT: CPT

## 2024-12-07 PROCEDURE — 2500000002 HC RX 250 W HCPCS SELF ADMINISTERED DRUGS (ALT 637 FOR MEDICARE OP, ALT 636 FOR OP/ED): Performed by: REGISTERED NURSE

## 2024-12-07 PROCEDURE — 71275 CT ANGIOGRAPHY CHEST: CPT

## 2024-12-07 PROCEDURE — 85520 HEPARIN ASSAY: CPT | Performed by: HOSPITALIST

## 2024-12-07 PROCEDURE — 83735 ASSAY OF MAGNESIUM: CPT | Performed by: REGISTERED NURSE

## 2024-12-07 PROCEDURE — 83880 ASSAY OF NATRIURETIC PEPTIDE: CPT | Performed by: REGISTERED NURSE

## 2024-12-07 PROCEDURE — 81001 URINALYSIS AUTO W/SCOPE: CPT | Performed by: REGISTERED NURSE

## 2024-12-07 PROCEDURE — 2500000004 HC RX 250 GENERAL PHARMACY W/ HCPCS (ALT 636 FOR OP/ED): Performed by: HOSPITALIST

## 2024-12-07 PROCEDURE — 96365 THER/PROPH/DIAG IV INF INIT: CPT | Mod: 59

## 2024-12-07 PROCEDURE — 71046 X-RAY EXAM CHEST 2 VIEWS: CPT | Performed by: RADIOLOGY

## 2024-12-07 PROCEDURE — 2060000001 HC INTERMEDIATE ICU ROOM DAILY

## 2024-12-07 PROCEDURE — 93005 ELECTROCARDIOGRAM TRACING: CPT | Mod: 59

## 2024-12-07 RX ORDER — ACETAMINOPHEN 160 MG/5ML
650 SOLUTION ORAL EVERY 4 HOURS PRN
Status: ACTIVE | OUTPATIENT
Start: 2024-12-07

## 2024-12-07 RX ORDER — ACETAMINOPHEN 325 MG/1
650 TABLET ORAL EVERY 4 HOURS PRN
Status: ACTIVE | OUTPATIENT
Start: 2024-12-07

## 2024-12-07 RX ORDER — LABETALOL HYDROCHLORIDE 5 MG/ML
20 INJECTION, SOLUTION INTRAVENOUS EVERY 6 HOURS PRN
Status: DISPENSED | OUTPATIENT
Start: 2024-12-07

## 2024-12-07 RX ORDER — ATORVASTATIN CALCIUM 20 MG/1
40 TABLET, FILM COATED ORAL NIGHTLY
Status: DISPENSED | OUTPATIENT
Start: 2024-12-07

## 2024-12-07 RX ORDER — IPRATROPIUM BROMIDE AND ALBUTEROL SULFATE 2.5; .5 MG/3ML; MG/3ML
3 SOLUTION RESPIRATORY (INHALATION) EVERY 20 MIN
Status: COMPLETED | OUTPATIENT
Start: 2024-12-07 | End: 2024-12-07

## 2024-12-07 RX ORDER — VALSARTAN 160 MG/1
160 TABLET ORAL DAILY
Status: DISCONTINUED | OUTPATIENT
Start: 2024-12-07 | End: 2024-12-08

## 2024-12-07 RX ORDER — ACETAMINOPHEN 650 MG/1
650 SUPPOSITORY RECTAL EVERY 4 HOURS PRN
Status: ACTIVE | OUTPATIENT
Start: 2024-12-07

## 2024-12-07 RX ORDER — IPRATROPIUM BROMIDE AND ALBUTEROL SULFATE 2.5; .5 MG/3ML; MG/3ML
3 SOLUTION RESPIRATORY (INHALATION)
Status: DISCONTINUED | OUTPATIENT
Start: 2024-12-07 | End: 2024-12-08

## 2024-12-07 RX ORDER — ONDANSETRON 4 MG/1
4 TABLET, FILM COATED ORAL EVERY 8 HOURS PRN
Status: ACTIVE | OUTPATIENT
Start: 2024-12-07

## 2024-12-07 RX ORDER — NAPROXEN SODIUM 220 MG/1
81 TABLET, FILM COATED ORAL DAILY
Status: DISPENSED | OUTPATIENT
Start: 2024-12-08

## 2024-12-07 RX ORDER — IBUPROFEN 200 MG
1 TABLET ORAL DAILY
Status: DISPENSED | OUTPATIENT
Start: 2024-12-07

## 2024-12-07 RX ORDER — HYDROCHLOROTHIAZIDE 12.5 MG/1
12.5 TABLET ORAL DAILY
Status: DISCONTINUED | OUTPATIENT
Start: 2024-12-07 | End: 2024-12-08

## 2024-12-07 RX ORDER — AMLODIPINE BESYLATE 5 MG/1
10 TABLET ORAL DAILY
Status: DISPENSED | OUTPATIENT
Start: 2024-12-08

## 2024-12-07 RX ORDER — POLYETHYLENE GLYCOL 3350 17 G/17G
17 POWDER, FOR SOLUTION ORAL DAILY PRN
Status: ACTIVE | OUTPATIENT
Start: 2024-12-07

## 2024-12-07 RX ORDER — AMLODIPINE BESYLATE 5 MG/1
10 TABLET ORAL ONCE
Status: COMPLETED | OUTPATIENT
Start: 2024-12-07 | End: 2024-12-07

## 2024-12-07 RX ORDER — NAPROXEN SODIUM 220 MG/1
324 TABLET, FILM COATED ORAL ONCE
Status: COMPLETED | OUTPATIENT
Start: 2024-12-07 | End: 2024-12-07

## 2024-12-07 RX ORDER — ISOSORBIDE MONONITRATE 30 MG/1
30 TABLET, EXTENDED RELEASE ORAL DAILY
Status: DISPENSED | OUTPATIENT
Start: 2024-12-07

## 2024-12-07 RX ORDER — HEPARIN SODIUM 10000 [USP'U]/100ML
0-4000 INJECTION, SOLUTION INTRAVENOUS CONTINUOUS
Status: DISCONTINUED | OUTPATIENT
Start: 2024-12-07 | End: 2024-12-08

## 2024-12-07 RX ORDER — ONDANSETRON HYDROCHLORIDE 2 MG/ML
4 INJECTION, SOLUTION INTRAVENOUS EVERY 8 HOURS PRN
Status: ACTIVE | OUTPATIENT
Start: 2024-12-07

## 2024-12-07 RX ORDER — AZITHROMYCIN 250 MG/1
500 TABLET, FILM COATED ORAL
Status: DISPENSED | OUTPATIENT
Start: 2024-12-08 | End: 2024-12-10

## 2024-12-07 RX ADMIN — HEPARIN SODIUM 1000 UNITS/HR: 10000 INJECTION, SOLUTION INTRAVENOUS at 14:56

## 2024-12-07 RX ADMIN — IPRATROPIUM BROMIDE AND ALBUTEROL SULFATE 3 ML: 2.5; .5 SOLUTION RESPIRATORY (INHALATION) at 10:41

## 2024-12-07 RX ADMIN — HYDROCHLOROTHIAZIDE 12.5 MG: 12.5 TABLET ORAL at 18:16

## 2024-12-07 RX ADMIN — ASPIRIN 324 MG: 81 TABLET, CHEWABLE ORAL at 13:10

## 2024-12-07 RX ADMIN — ATORVASTATIN CALCIUM 40 MG: 20 TABLET, FILM COATED ORAL at 20:06

## 2024-12-07 RX ADMIN — NICOTINE 1 PATCH: 21 PATCH, EXTENDED RELEASE TRANSDERMAL at 15:24

## 2024-12-07 RX ADMIN — VALSARTAN 160 MG: 160 TABLET, FILM COATED ORAL at 18:16

## 2024-12-07 RX ADMIN — ISOSORBIDE MONONITRATE 30 MG: 30 TABLET, EXTENDED RELEASE ORAL at 18:16

## 2024-12-07 RX ADMIN — AMLODIPINE BESYLATE 10 MG: 5 TABLET ORAL at 11:10

## 2024-12-07 RX ADMIN — IPRATROPIUM BROMIDE AND ALBUTEROL SULFATE 3 ML: 2.5; .5 SOLUTION RESPIRATORY (INHALATION) at 10:42

## 2024-12-07 RX ADMIN — IPRATROPIUM BROMIDE AND ALBUTEROL SULFATE 3 ML: 2.5; .5 SOLUTION RESPIRATORY (INHALATION) at 10:44

## 2024-12-07 RX ADMIN — IOHEXOL 75 ML: 350 INJECTION, SOLUTION INTRAVENOUS at 11:31

## 2024-12-07 RX ADMIN — IPRATROPIUM BROMIDE AND ALBUTEROL SULFATE 3 ML: 2.5; .5 SOLUTION RESPIRATORY (INHALATION) at 21:15

## 2024-12-07 RX ADMIN — METHYLPREDNISOLONE SODIUM SUCCINATE 125 MG: 125 INJECTION, POWDER, FOR SOLUTION INTRAMUSCULAR; INTRAVENOUS at 10:42

## 2024-12-07 RX ADMIN — CEFTRIAXONE 2 G: 2 INJECTION, POWDER, FOR SOLUTION INTRAMUSCULAR; INTRAVENOUS at 12:31

## 2024-12-07 RX ADMIN — AZITHROMYCIN MONOHYDRATE 500 MG: 500 INJECTION, POWDER, LYOPHILIZED, FOR SOLUTION INTRAVENOUS at 12:53

## 2024-12-07 RX ADMIN — LABETALOL HYDROCHLORIDE 20 MG: 5 INJECTION, SOLUTION INTRAVENOUS at 15:24

## 2024-12-07 RX ADMIN — IPRATROPIUM BROMIDE AND ALBUTEROL SULFATE 3 ML: 2.5; .5 SOLUTION RESPIRATORY (INHALATION) at 14:47

## 2024-12-07 RX ADMIN — METHYLPREDNISOLONE SODIUM SUCCINATE 40 MG: 40 INJECTION, POWDER, FOR SOLUTION INTRAMUSCULAR; INTRAVENOUS at 20:06

## 2024-12-07 RX ADMIN — HEPARIN SODIUM 1200 UNITS/HR: 10000 INJECTION, SOLUTION INTRAVENOUS at 21:07

## 2024-12-07 SDOH — ECONOMIC STABILITY: TRANSPORTATION INSECURITY: IN THE PAST 12 MONTHS, HAS LACK OF TRANSPORTATION KEPT YOU FROM MEDICAL APPOINTMENTS OR FROM GETTING MEDICATIONS?: NO

## 2024-12-07 SDOH — SOCIAL STABILITY: SOCIAL INSECURITY: ABUSE: ADULT

## 2024-12-07 SDOH — ECONOMIC STABILITY: FOOD INSECURITY: WITHIN THE PAST 12 MONTHS, YOU WORRIED THAT YOUR FOOD WOULD RUN OUT BEFORE YOU GOT THE MONEY TO BUY MORE.: NEVER TRUE

## 2024-12-07 SDOH — SOCIAL STABILITY: SOCIAL INSECURITY: ARE YOU MARRIED, WIDOWED, DIVORCED, SEPARATED, NEVER MARRIED, OR LIVING WITH A PARTNER?: PATIENT DECLINED

## 2024-12-07 SDOH — ECONOMIC STABILITY: HOUSING INSECURITY: AT ANY TIME IN THE PAST 12 MONTHS, WERE YOU HOMELESS OR LIVING IN A SHELTER (INCLUDING NOW)?: NO

## 2024-12-07 SDOH — HEALTH STABILITY: PHYSICAL HEALTH
HOW OFTEN DO YOU NEED TO HAVE SOMEONE HELP YOU WHEN YOU READ INSTRUCTIONS, PAMPHLETS, OR OTHER WRITTEN MATERIAL FROM YOUR DOCTOR OR PHARMACY?: NEVER

## 2024-12-07 SDOH — HEALTH STABILITY: MENTAL HEALTH
DO YOU FEEL STRESS - TENSE, RESTLESS, NERVOUS, OR ANXIOUS, OR UNABLE TO SLEEP AT NIGHT BECAUSE YOUR MIND IS TROUBLED ALL THE TIME - THESE DAYS?: NOT AT ALL

## 2024-12-07 SDOH — ECONOMIC STABILITY: HOUSING INSECURITY: IN THE LAST 12 MONTHS, WAS THERE A TIME WHEN YOU WERE NOT ABLE TO PAY THE MORTGAGE OR RENT ON TIME?: NO

## 2024-12-07 SDOH — SOCIAL STABILITY: SOCIAL INSECURITY
WITHIN THE LAST YEAR, HAVE YOU BEEN KICKED, HIT, SLAPPED, OR OTHERWISE PHYSICALLY HURT BY YOUR PARTNER OR EX-PARTNER?: NO

## 2024-12-07 SDOH — SOCIAL STABILITY: SOCIAL INSECURITY
WITHIN THE LAST YEAR, HAVE YOU BEEN RAPED OR FORCED TO HAVE ANY KIND OF SEXUAL ACTIVITY BY YOUR PARTNER OR EX-PARTNER?: NO

## 2024-12-07 SDOH — SOCIAL STABILITY: SOCIAL INSECURITY: DOES ANYONE TRY TO KEEP YOU FROM HAVING/CONTACTING OTHER FRIENDS OR DOING THINGS OUTSIDE YOUR HOME?: NO

## 2024-12-07 SDOH — SOCIAL STABILITY: SOCIAL NETWORK: HOW OFTEN DO YOU GET TOGETHER WITH FRIENDS OR RELATIVES?: NEVER

## 2024-12-07 SDOH — ECONOMIC STABILITY: INCOME INSECURITY: IN THE PAST 12 MONTHS HAS THE ELECTRIC, GAS, OIL, OR WATER COMPANY THREATENED TO SHUT OFF SERVICES IN YOUR HOME?: NO

## 2024-12-07 SDOH — SOCIAL STABILITY: SOCIAL INSECURITY: ARE YOU OR HAVE YOU BEEN THREATENED OR ABUSED PHYSICALLY, EMOTIONALLY, OR SEXUALLY BY ANYONE?: NO

## 2024-12-07 SDOH — HEALTH STABILITY: PHYSICAL HEALTH
ON AVERAGE, HOW MANY DAYS PER WEEK DO YOU ENGAGE IN MODERATE TO STRENUOUS EXERCISE (LIKE A BRISK WALK)?: PATIENT DECLINED

## 2024-12-07 SDOH — SOCIAL STABILITY: SOCIAL NETWORK: HOW OFTEN DO YOU ATTEND CHURCH OR RELIGIOUS SERVICES?: NEVER

## 2024-12-07 SDOH — SOCIAL STABILITY: SOCIAL INSECURITY: HAVE YOU HAD ANY THOUGHTS OF HARMING ANYONE ELSE?: NO

## 2024-12-07 SDOH — SOCIAL STABILITY: SOCIAL INSECURITY: HAS ANYONE EVER THREATENED TO HURT YOUR FAMILY OR YOUR PETS?: NO

## 2024-12-07 SDOH — ECONOMIC STABILITY: FOOD INSECURITY: WITHIN THE PAST 12 MONTHS, THE FOOD YOU BOUGHT JUST DIDN'T LAST AND YOU DIDN'T HAVE MONEY TO GET MORE.: NEVER TRUE

## 2024-12-07 SDOH — ECONOMIC STABILITY: HOUSING INSECURITY: IN THE PAST 12 MONTHS, HOW MANY TIMES HAVE YOU MOVED WHERE YOU WERE LIVING?: 1

## 2024-12-07 SDOH — SOCIAL STABILITY: SOCIAL INSECURITY: WITHIN THE LAST YEAR, HAVE YOU BEEN HUMILIATED OR EMOTIONALLY ABUSED IN OTHER WAYS BY YOUR PARTNER OR EX-PARTNER?: NO

## 2024-12-07 SDOH — SOCIAL STABILITY: SOCIAL INSECURITY: WITHIN THE LAST YEAR, HAVE YOU BEEN AFRAID OF YOUR PARTNER OR EX-PARTNER?: NO

## 2024-12-07 SDOH — ECONOMIC STABILITY: FOOD INSECURITY: HOW HARD IS IT FOR YOU TO PAY FOR THE VERY BASICS LIKE FOOD, HOUSING, MEDICAL CARE, AND HEATING?: NOT HARD AT ALL

## 2024-12-07 SDOH — SOCIAL STABILITY: SOCIAL NETWORK
DO YOU BELONG TO ANY CLUBS OR ORGANIZATIONS SUCH AS CHURCH GROUPS, UNIONS, FRATERNAL OR ATHLETIC GROUPS, OR SCHOOL GROUPS?: NO

## 2024-12-07 SDOH — SOCIAL STABILITY: SOCIAL NETWORK: IN A TYPICAL WEEK, HOW MANY TIMES DO YOU TALK ON THE PHONE WITH FAMILY, FRIENDS, OR NEIGHBORS?: NEVER

## 2024-12-07 SDOH — SOCIAL STABILITY: SOCIAL INSECURITY: HAVE YOU HAD THOUGHTS OF HARMING ANYONE ELSE?: NO

## 2024-12-07 SDOH — SOCIAL STABILITY: SOCIAL INSECURITY: DO YOU FEEL UNSAFE GOING BACK TO THE PLACE WHERE YOU ARE LIVING?: NO

## 2024-12-07 SDOH — HEALTH STABILITY: PHYSICAL HEALTH: ON AVERAGE, HOW MANY MINUTES DO YOU ENGAGE IN EXERCISE AT THIS LEVEL?: PATIENT DECLINED

## 2024-12-07 SDOH — SOCIAL STABILITY: SOCIAL INSECURITY: DO YOU FEEL ANYONE HAS EXPLOITED OR TAKEN ADVANTAGE OF YOU FINANCIALLY OR OF YOUR PERSONAL PROPERTY?: NO

## 2024-12-07 SDOH — SOCIAL STABILITY: SOCIAL NETWORK: HOW OFTEN DO YOU ATTEND MEETINGS OF THE CLUBS OR ORGANIZATIONS YOU BELONG TO?: NEVER

## 2024-12-07 SDOH — SOCIAL STABILITY: SOCIAL INSECURITY: WERE YOU ABLE TO COMPLETE ALL THE BEHAVIORAL HEALTH SCREENINGS?: YES

## 2024-12-07 SDOH — SOCIAL STABILITY: SOCIAL INSECURITY: ARE THERE ANY APPARENT SIGNS OF INJURIES/BEHAVIORS THAT COULD BE RELATED TO ABUSE/NEGLECT?: NO

## 2024-12-07 ASSESSMENT — LIFESTYLE VARIABLES
AUDIT-C TOTAL SCORE: 0
HOW OFTEN DO YOU HAVE A DRINK CONTAINING ALCOHOL: NEVER
HOW MANY STANDARD DRINKS CONTAINING ALCOHOL DO YOU HAVE ON A TYPICAL DAY: PATIENT DOES NOT DRINK
SUBSTANCE_ABUSE_PAST_12_MONTHS: NO
SKIP TO QUESTIONS 9-10: 1
HAVE YOU EVER FELT YOU SHOULD CUT DOWN ON YOUR DRINKING: NO
EVER FELT BAD OR GUILTY ABOUT YOUR DRINKING: NO
AUDIT-C TOTAL SCORE: 0
TOTAL SCORE: 0
HAVE PEOPLE ANNOYED YOU BY CRITICIZING YOUR DRINKING: NO
EVER HAD A DRINK FIRST THING IN THE MORNING TO STEADY YOUR NERVES TO GET RID OF A HANGOVER: NO
HOW OFTEN DO YOU HAVE 6 OR MORE DRINKS ON ONE OCCASION: NEVER
PRESCIPTION_ABUSE_PAST_12_MONTHS: NO

## 2024-12-07 ASSESSMENT — ACTIVITIES OF DAILY LIVING (ADL)
JUDGMENT_ADEQUATE_SAFELY_COMPLETE_DAILY_ACTIVITIES: YES
ADEQUATE_TO_COMPLETE_ADL: YES
HEARING - RIGHT EAR: FUNCTIONAL
LACK_OF_TRANSPORTATION: NO
LACK_OF_TRANSPORTATION: NO
WALKS IN HOME: INDEPENDENT
FEEDING YOURSELF: INDEPENDENT
GROOMING: INDEPENDENT
TOILETING: INDEPENDENT
BATHING: INDEPENDENT
HEARING - LEFT EAR: FUNCTIONAL
PATIENT'S MEMORY ADEQUATE TO SAFELY COMPLETE DAILY ACTIVITIES?: YES
DRESSING YOURSELF: INDEPENDENT

## 2024-12-07 ASSESSMENT — COGNITIVE AND FUNCTIONAL STATUS - GENERAL
PATIENT BASELINE BEDBOUND: NO
DAILY ACTIVITIY SCORE: 24
MOBILITY SCORE: 24
DAILY ACTIVITIY SCORE: 24
MOBILITY SCORE: 24

## 2024-12-07 ASSESSMENT — COLUMBIA-SUICIDE SEVERITY RATING SCALE - C-SSRS
6. HAVE YOU EVER DONE ANYTHING, STARTED TO DO ANYTHING, OR PREPARED TO DO ANYTHING TO END YOUR LIFE?: NO
2. HAVE YOU ACTUALLY HAD ANY THOUGHTS OF KILLING YOURSELF?: NO
1. IN THE PAST MONTH, HAVE YOU WISHED YOU WERE DEAD OR WISHED YOU COULD GO TO SLEEP AND NOT WAKE UP?: NO

## 2024-12-07 ASSESSMENT — PAIN DESCRIPTION - PROGRESSION: CLINICAL_PROGRESSION: NOT CHANGED

## 2024-12-07 ASSESSMENT — PATIENT HEALTH QUESTIONNAIRE - PHQ9
1. LITTLE INTEREST OR PLEASURE IN DOING THINGS: NOT AT ALL
2. FEELING DOWN, DEPRESSED OR HOPELESS: NOT AT ALL
SUM OF ALL RESPONSES TO PHQ9 QUESTIONS 1 & 2: 0

## 2024-12-07 ASSESSMENT — PAIN SCALES - GENERAL
PAINLEVEL_OUTOF10: 0 - NO PAIN

## 2024-12-07 ASSESSMENT — PAIN - FUNCTIONAL ASSESSMENT
PAIN_FUNCTIONAL_ASSESSMENT: 0-10
PAIN_FUNCTIONAL_ASSESSMENT: 0-10

## 2024-12-07 NOTE — H&P
History Of Present Illness  Gustavo Foreman is a 54 y.o. male with history of HTN presented to the ED with HTN on and off for the past few weeks which has progressed to shortness breath and wheezing. He was exposed to pneumonia from his daughter and came in to see if this is what he had. No fevers or chills but he is sweating. He has not seen a PCP in years and currently is not on any medications. He smokes 2-3 packs a week. In the ED he was visibly sob and sweating. CT chest confirmed pneumonia. He was given abx, steroids and nebs with some improvement and subsequently admitted for further evaluation and treatment.      Past Medical History  HTN     Surgical History  He has a past surgical history that includes Hand surgery (02/09/2016).     Social History  Smokes 2 packs of cigarettes daily, no history of alcohol or illicit drug use    Family History  CAD     10 point review of systems negative except per HPI      Last Recorded Vitals  BP (!) 185/99   Pulse 80   Temp 36.4 °C (97.5 °F)   Resp 20   Wt 102 kg (225 lb)   SpO2 95%     Physical Exam   Gen: sob with speaking, diaphoretic  HEENT: EOM, MMM  CV: RRR, no murmurs rubs or gallops  Resp: coarse rhonchi and wheezing b/l   Abdomen: soft, NT,+BS  LE: No edema    Relevant Results  Labs Reviewed   COMPREHENSIVE METABOLIC PANEL - Abnormal       Result Value    Glucose 120 (*)     Sodium 137      Potassium 3.7      Chloride 103      Bicarbonate 26      Anion Gap 12      Urea Nitrogen 11      Creatinine 0.83      eGFR >90      Calcium 8.4 (*)     Albumin 4.0      Alkaline Phosphatase 76      Total Protein 7.3      AST 19      Bilirubin, Total 0.6      ALT 19     D-DIMER, VTE EXCLUSION - Abnormal    D-Dimer, Quantitative VTE Exclusion 1,129 (*)     Narrative:     The VTE Exclusion D-Dimer assay is reported in ng/mL Fibrinogen Equivalent Units (FEU).    Per 's instructions for use, a value of less than 500 ng/mL (FEU) may help to exclude DVT or PE in  outpatients when the assay is used with a clinical pretest probability assessment.(AEMR must utilize and document eCalc 'Wells Score Deep Vein Thrombosis Risk' for DVT exclusion only. Emergency Department should utilize  Guidelines for Emergency Department Use of the VTE Exclusion D-Dimer and Clinical Pretest probability assessment model for DVT or PE exclusion.)   DRUG SCREEN, URINE WITH REFLEX TO CONFIRMATION - Abnormal    Amphetamine Screen, Urine Presumptive Negative      Barbiturate Screen, Urine Presumptive Negative      Benzodiazepines Screen, Urine Presumptive Negative      Cannabinoid Screen, Urine Presumptive Positive (*)     Cocaine Metabolite Screen, Urine Presumptive Negative      Fentanyl Screen, Urine Presumptive Negative      Opiate Screen, Urine Presumptive Negative      Oxycodone Screen, Urine Presumptive Negative      PCP Screen, Urine Presumptive Negative      Methadone Screen, Urine Presumptive Negative      Narrative:     Drug screen results are presumptive and should not be used to assess   compliance with prescribed medication. Definitive confirmatory drug testing   has been added to this sample for any positive screen result and will be  reported separately.     Toxicology screening results are reported qualitatively. The concentration must  be greater than or equal to the cutoff to be reported as positive. The concentration   at which the screening test can detect an individual drug or metabolite varies.   The absence of expected drug(s) and/or drug metabolite(s) may indicate non-compliance,   inappropriate timing of specimen collection relative to drug administration, poor drug   absorption, diluted/adulterated urine, or limitations of testing. For medical purposes   only; not valid for forensic use.    Interpretive questions should be directed to the laboratory medical directors.   SERIAL TROPONIN-INITIAL - Abnormal    Troponin I, High Sensitivity 93 (*)     Narrative:     Less than  99th percentile of normal range cutoff-  Female and children under 18 years old <14 ng/L; Male <21 ng/L: Negative  Repeat testing should be performed if clinically indicated.     Female and children under 18 years old 14-50 ng/L; Male 21-50 ng/L:  Consistent with possible cardiac damage and possible increased clinical   risk. Serial measurements may help to assess extent of myocardial damage.     >50 ng/L: Consistent with cardiac damage, increased clinical risk and  myocardial infarction. Serial measurements may help assess extent of   myocardial damage.      NOTE: Children less than 1 year old may have higher baseline troponin   levels and results should be interpreted in conjunction with the overall   clinical context.     NOTE: Troponin I testing is performed using a different   testing methodology at [facility] than at other   [facility]. Direct result comparisons should only   be made within the same method.   SERIAL TROPONIN, 1 HOUR - Abnormal    Troponin I, High Sensitivity 88 (*)     Narrative:     Less than 99th percentile of normal range cutoff-  Female and children under 18 years old <14 ng/L; Male <21 ng/L: Negative  Repeat testing should be performed if clinically indicated.     Female and children under 18 years old 14-50 ng/L; Male 21-50 ng/L:  Consistent with possible cardiac damage and possible increased clinical   risk. Serial measurements may help to assess extent of myocardial damage.     >50 ng/L: Consistent with cardiac damage, increased clinical risk and  myocardial infarction. Serial measurements may help assess extent of   myocardial damage.      NOTE: Children less than 1 year old may have higher baseline troponin   levels and results should be interpreted in conjunction with the overall   clinical context.     NOTE: Troponin I testing is performed using a different   testing methodology at [facility] than at other   [facility]. Direct result  comparisons should only   be made within the same method.   URINALYSIS WITH REFLEX CULTURE AND MICROSCOPIC - Abnormal    Color, Urine Yellow      Appearance, Urine Clear      Specific Gravity, Urine 1.032      pH, Urine 6.5      Protein, Urine 30 (1+) (*)     Glucose, Urine Normal      Blood, Urine NEGATIVE      Ketones, Urine 10 (1+) (*)     Bilirubin, Urine NEGATIVE      Urobilinogen, Urine 4 (2+) (*)     Nitrite, Urine NEGATIVE      Leukocyte Esterase, Urine NEGATIVE     B-TYPE NATRIURETIC PEPTIDE - Abnormal     (*)     Narrative:        <100 pg/mL - Heart failure unlikely  100-299 pg/mL - Intermediate probability of acute heart                  failure exacerbation. Correlate with clinical                  context and patient history.    >=300 pg/mL - Heart Failure likely. Correlate with clinical                  context and patient history.    BNP testing is performed using different testing methodology at [facility] than at [facility]. Direct result comparisons should only be made within the same method.      POCT GLUCOSE - Abnormal    POCT Glucose 130 (*)    MAGNESIUM - Normal    Magnesium 1.73     SARS-COV-2 PCR - Normal    Coronavirus 2019, PCR Not Detected      Narrative:     This assay has received FDA Emergency Use Authorization (EUA) and is only authorized for the duration of time that circumstances exist to justify the authorization of the emergency use of in vitro diagnostic tests for the detection of SARS-CoV-2 virus and/or diagnosis of COVID-19 infection under section 564(b)(1) of the Act, 21 U.S.C. 360bbb-3(b)(1). This assay is an in vitro diagnostic nucleic acid amplification test for the qualitative detection of SARS-CoV-2 from nasopharyngeal specimens and has been validated for use at [facility]. Negative results do not preclude COVID-19 infections and should not be used as the sole basis for diagnosis, treatment, or other management  decisions.     INFLUENZA A AND B PCR - Normal    Flu A Result Not Detected      Flu B Result Not Detected      Narrative:     This assay is an in vitro diagnostic multiplex nucleic acid amplification test for the detection and discrimination of Influenza A & B from nasopharyngeal specimens, and has been validated for use at [facility]. Negative results do not preclude Influenza A/B infections, and should not be used as the sole basis for diagnosis, treatment, or other management decisions. If Influenza A/B and RSV PCR results are negative, testing for Parainfluenza virus, Adenovirus and Metapneumovirus is routinely performed for [facility] pediatric oncology and intensive care inpatients, and is available on other patients by placing an add-on request.   LACTATE - Normal    Lactate 1.6      Narrative:     Venipuncture immediately after or during the administration of Metamizole may lead to falsely low results. Testing should be performed immediately prior to Metamizole dosing.   URINALYSIS MICROSCOPIC WITH REFLEX CULTURE - Normal    WBC, Urine 1-5      RBC, Urine 1-2     BLOOD CULTURE   BLOOD CULTURE   TROPONIN SERIES- (INITIAL, 1 HR)    Narrative:     The following orders were created for panel order Troponin I Series, High Sensitivity (0, 1 HR).  Procedure                               Abnormality         Status                     ---------                               -----------         ------                     Troponin I, High Sensiti...[[id number]]  Abnormal            Final result               Troponin, High Sensitivi...[[id number]]  Abnormal            Final result                 Please view results for these tests on the individual orders.   CBC WITH AUTO DIFFERENTIAL    WBC 9.1      nRBC 0.0      RBC 4.73      Hemoglobin 14.7      Hematocrit 43.0      MCV 91      MCH 31.1      MCHC 34.2      RDW 12.8      Platelets 214      Neutrophils % 77.0      Immature Granulocytes %, Automated 0.4       Lymphocytes % 13.2      Monocytes % 8.5      Eosinophils % 0.7      Basophils % 0.2      Neutrophils Absolute 6.97      Immature Granulocytes Absolute, Automated 0.04      Lymphocytes Absolute 1.20      Monocytes Absolute 0.77      Eosinophils Absolute 0.06      Basophils Absolute 0.02     URINALYSIS WITH REFLEX CULTURE AND MICROSCOPIC    Narrative:     The following orders were created for panel order Urinalysis with Reflex Culture and Microscopic.  Procedure                               Abnormality         Status                     ---------                               -----------         ------                     Urinalysis with Reflex C...[[id number]]  Abnormal            Final result               Extra Urine Gray Tube[[id number]]                            In process                   Please view results for these tests on the individual orders.   EXTRA URINE GRAY TUBE   THC (MARIJUANA), URINE, CONFIRMATION     CT angio chest for pulmonary embolism   Final Result   CARITO CONSOLIDATION EXTENDING FROM THE HILUS THROUGH THE MEDIAL   SEGMENT MIDDLE LOBE SUSPECTED PNEUMONIA        OTHER LESS CONSOLIDATIVE AIRSPACE DISEASE IN ALL OF THE OTHER LOBES   AND AT THE PERIPHERY OF THE MIDDLE LOBE CONSOLIDATION. THESE FINDINGS   ARE CLUSTERS OF VARIABLY SIZED SOLID AND GROUND-GLASS COMPOSITION   NODULES WITH INTERVENING HAZY GROUND-GLASS AIRSPACE DISEASE   THROUGHOUT THE REGIONS OF THE CLUSTER OF NODULES, SUSPECT FOR   PNEUMONIA IF A DIFFERENT VARIETY OR PERHAPS ONLY EARLY CONSOLIDATIVE        NO OTHER ACUTE DISEASE IN THE CHEST        NO ACUTE PULMONARY EMBOLISM THROUGH THE LOBAR BRANCH LEVEL        NO AORTIC DISSECTION OR OTHER ACUTE THORACIC AORTIC FINDINGS        NO PLEURAL OR PERICARDIAL EFFUSION        NO PNEUMOTHORAX        MACRO:   None        Signed by: Valente Walters 12/7/2024 11:54 AM   Dictation workstation:   [id number]      XR chest 2 views   Final Result   Bilateral perihilar infiltrates.        MACRO:    None        Signed by: Lizy Gamez 12/7/2024 10:31 AM   Dictation workstation:   [id number]        Assessment/Plan 54 year old male admitted with dyspnea secondary to acute COPD exacerbation, community acquired pneumonia and nstemi    -continue IV abx, nebs and steroids  -currently on RA    nstemi: prior to feeling sob has had chest pain on and off for the past few weeks  -monitor on tele, trend troponins   -cardiology consulted, continue aspirin and heparin gtt  -started on statin     HTN urgency: amlodipine started, allergy to ace, prn labetalol     Nicotine abuse: smokes 2-3 packs a day, trying to cut back  -nicotine patch here     DVT ppx: heparin gtt      Bud Haddad MD

## 2024-12-07 NOTE — ED PROVIDER NOTES
HPI   Chief Complaint   Patient presents with    Shortness of Breath     Pt thinks he has pneumonia cough, congestion sob and coughing up blood      54-year-old male with past medical history significant for hypertension who has not been on his blood pressure medications for several years presents emergency department today for evaluation of shortness of breath.  Patient tells me since Thursday he has been experiencing shortness of breath.  Patient tells me earlier in the week his daughter had been diagnosed with pneumonia.  Patient tells me he is concerned that he also has pneumonia.  Patient reports shortness of breath.  Patient denies chest pain.  Patient denies fever or chills but tells me while he was sitting there he broke out in a sweat.  Patient endorses smoking about a pack of cigarettes per day.  Patient tells me he has been experiencing a productive cough that started to have blood streaks in it today.  Patient denies abdominal pain, nausea vomiting or diarrhea.  Patient tells me due to not having insurance he has been off his blood pressure medicines for several years.  He tells me he got insurance back a couple months ago but has not been to a primary care doctor to reinitiate his medications.  Patient denies headache.  Patient denies dizziness or lightheadedness.  Patient has no other complaints.      History provided by:  Patient and medical records   used: No            Patient History   Past Medical History:   Diagnosis Date    Personal history of other diseases of the respiratory system     History of chronic obstructive lung disease    Personal history of other infectious and parasitic diseases     History of Legionnaire's disease    Personal history of other venous thrombosis and embolism     History of deep venous thrombosis    Personal history of pulmonary embolism     History of pulmonary embolism    Personal history of suicidal behavior     History of suicide attempt      Past Surgical History:   Procedure Laterality Date    HAND SURGERY  02/09/2016    Hand Surgery                                                                                                                                                           No family history on file.  Social History     Tobacco Use    Smoking status: Not on file    Smokeless tobacco: Not on file   Substance Use Topics    Alcohol use: Not on file    Drug use: Not on file       Physical Exam   ED Triage Vitals [12/07/24 0947]   Temperature Heart Rate Respirations BP   36.4 °C (97.5 °F) 93 20 (!) 237/121      Pulse Ox Temp src Heart Rate Source Patient Position   94 % -- -- --      BP Location FiO2 (%)     -- --       Physical Exam  Vitals and nursing note reviewed.   Constitutional:       Appearance: He is well-developed.   Cardiovascular:      Rate and Rhythm: Normal rate and regular rhythm.   Pulmonary:      Breath sounds: Decreased breath sounds, wheezing and rhonchi present.   Skin:     General: Skin is warm and dry.      Capillary Refill: Capillary refill takes less than 2 seconds.   Neurological:      General: No focal deficit present.      Mental Status: He is alert and oriented to person, place, and time.   Psychiatric:         Mood and Affect: Mood normal.         Behavior: Behavior normal.           ED Course & MDM   Diagnoses as of 12/07/24 1304   Dyspnea, unspecified type   Pneumonia of both lower lobes due to infectious organism   Elevated troponin                 No data recorded     [place] Coma Scale Score: 15 (12/07/24 0944 : Essence Archuleta RN)                           Medical Decision Making    Patient seen examined emergency department; patient is tachypneic on exam.  Patient has a moist productive cough on exam.  Respirations are unlabored.  Patient has diminished lung sounds with wheezing and rhonchi.  Heart regular rate and rhythm.  Patient is diaphoretic he tells me he just darted to break out in a sweat as he  was sitting in the ER room.  Abdomen soft rounded nontender.  Patient is satting 94% on room air.  I did have respiratory come give patient DuoNebs x 3.  Patient also given IV Solu-Medrol.    Will order chest x-ray, EKG and troponin to rule out acute ACS.  Will also order a D-dimer to evaluate the need for PE study as a contributing factor to patient's symptoms.  Will also order basic labs and urinalysis.    EKG at 10:08 with ventricular rate of 86, as interpreted by me, shows a normal rate and rhythm with left anterior fascicular block, normal axis, normal intervals. And nonspecific T wave abnormality with no evidence of acute ischemia or other acute findings.  T wave abnormality is also noted on EKG performed at 9/4/2023.    Repeat EKG at 11:42 with ventricular rate of 80, as interpreted by me, shows a normal rate and rhythm left anterior fascicular block, normal axis, normal intervals. And normal ST and T wave pattern with no evidence of acute ischemia or other acute findings    Patient's D-dimer came back elevated at 1001 129 therefore PE study was ordered.  CBC without leukocytosis.  Magnesium is 1.73.  CMP with a glucose of 120 otherwise unremarkable.  Micro swabs are negative.  BNP is 222.  Initial high sensitive troponin is 93, with a repeat of 88, patient given 325 of oral aspirin.  CT angio of the chest is negative for PE study but does show willi consolidation extending from the ileus to the medial segment suggesting pneumonia.    Given the patient does appear to have pneumonia lactate, blood cultures ordered.  Patient also given IV azithromycin and IV ceftriaxone.    Upon reevaluation patient still has audible wheezes although his rhonchi has somewhat improved.  Patient I discussed his elevated troponins and need for admission for further cardiac workup.  I also discussed with him given how wheezy he is he is going to need subsequent doses and of IV Solu-Medrol and breathing treatments.  Patient is in  agreement for admission at this time.  I did reach out to Dr. Owens who is on call for the hospitalist team and discussed patient's presentation.  Patient accepted for admission under observation status at this time.  Procedure  Procedures     Ivis Saba, VIJAY-HIRAL  12/07/24 1305

## 2024-12-07 NOTE — CONSULTS
Cardiology Consult Note  Patient: Gustavo Foreman  Unit/Bed: 810/810-A  YOB: 1970  MRN: [id number]  Acct: [id number]   Admitting Diagnosis: Elevated troponin [R79.89]  Pneumonia of both lower lobes due to infectious organism [J18.9]  Dyspnea, unspecified type [R06.00]  Date:  12/7/2024  Hospital Day: 0  Attending: Bud Haddad MD    Rounding cardiologist:  Nicholas Keenan MD,    Consultant: Dr. Nicholas Keenan     Primary Cardiologist:  None       Complaint:  Chief Complaint   Patient presents with    Shortness of Breath     Pt thinks he has pneumonia cough, congestion sob and coughing up blood         History of Present Illness:  54-year-old man without known coronary artery disease emergency department earlier today with shortness of breath and cough.  Family member recently diagnosed with pneumonia.  Also with diaphoresis.  Continues to smoke a pack of cigarettes a day.  Potentially some hemoptysis.  CT of the chest did not show evidence of PE but significant pneumonia consolidation of the right lobe.    Review of ECG: Lateral T inversion unchanged in comparison to study of 1 year ago.  Sinus rhythm    Troponin 85-95 Plateau/ BNP slight elevation to 24.  LDL cholesterol 103 normal renal and liver function.  Normal electrolytes normal CBC  Tox screen with evidence of marijuana otherwise negative    Currently with cough wheezing slightly diaphoretic ever since he presented.  He said on Thursday he suddenly felt very poorly coughing hard to breathe chest pain.  He also notes anterior precordial tightness if he does very much.  He works doing drywall and construction sites.  He says lately he has had to move slower.  He will have a crescendo decrescendo chest pressure resolves when he does not work quite as hard.  He also has not been on blood pressure medicines for quite some time.  He has had no palpitation, lightheadedness or syncope.  He has not had known prior myocardial infarction, stroke or  peripheral vascular disease.  He hurt his ankle when he fell through a hole in a construction site but has had no claudication.    Past cardiac history  Hypertension  2021 hospitalized at [facility] with chest discomfort.  At that time describes having extensive drug addiction history meth and cocaine just a few months prior to that admission.  Was smoking at that time as well.  ECG showed lateral T inversion negative troponins.  Blood pressure was poorly controlled cardiac enzymes were negative it was felt chest pain noncardiac in etiology.  Remote DVT/PE  Echocardiogram  mild LVH otherwise unremarkable    Past medical history  Prior drug abuse cocaine/meth  Tobacco abuse  COPD pulmonary function studies  FEV1 74% predicted.    Fam Hx  Maternal grandparents mother relatives with coronary disease  Father  in his 40s from aortic rupture  Hypertension    Social history  Continues to smoke, no longer abuse illicit drugs      Allergies:  Allergies   Allergen Reactions    Bee Venom Protein (Honey Bee) Anaphylaxis    Lisinopril Unknown     Nausea    Codeine Rash        PMHx:  Past Medical History:   Diagnosis Date    Personal history of other diseases of the respiratory system     History of chronic obstructive lung disease    Personal history of other infectious and parasitic diseases     History of Legionnaire's disease    Personal history of other venous thrombosis and embolism     History of deep venous thrombosis    Personal history of pulmonary embolism     History of pulmonary embolism    Personal history of suicidal behavior     History of suicide attempt       PSHx:  Past Surgical History:   Procedure Laterality Date    HAND SURGERY  2016    Hand Surgery                                                                                                                                                             Social Hx:  Social History     Socioeconomic History    Marital  status:    Tobacco Use    Smoking status: Every Day     Current packs/day: 2.00     Average packs/day: 2.0 packs/day for 30.9 years (61.9 ttl pk-yrs)     Types: Cigarettes     Start date: 1994    Smokeless tobacco: Never     Social Drivers of Health     Financial Resource Strain: Low Risk  (12/7/2024)    Overall Financial Resource Strain (CARDIA)     Difficulty of Paying Living Expenses: Not hard at all   Food Insecurity: No Food Insecurity (12/7/2024)    Hunger Vital Sign     Worried About Running Out of Food in the Last Year: Never true     Ran Out of Food in the Last Year: Never true   Transportation Needs: No Transportation Needs (12/7/2024)    PRAPARE - Transportation     Lack of Transportation (Medical): No     Lack of Transportation (Non-Medical): No   Physical Activity: Patient Declined (12/7/2024)    Exercise Vital Sign     Days of Exercise per Week: Patient declined     Minutes of Exercise per Session: Patient declined   Stress: No Stress Concern Present (12/7/2024)    [place] [employer] - Occupational Stress Questionnaire     Feeling of Stress : Not at all   Social Connections: Unknown (12/7/2024)    Social Connection and Isolation Panel [NHANES]     Frequency of Communication with Friends and Family: Never     Frequency of Social Gatherings with Friends and Family: Never     Attends Taoist Services: Never     Active Member of Clubs or Organizations: No     Attends Club or Organization Meetings: Never     Marital Status: Patient declined   Intimate Partner Violence: Not At Risk (12/7/2024)    Humiliation, Afraid, Rape, and Kick questionnaire     Fear of Current or Ex-Partner: No     Emotionally Abused: No     Physically Abused: No     Sexually Abused: No   Housing Stability: Low Risk  (12/7/2024)    Housing Stability Vital Sign     Unable to Pay for Housing in the Last Year: No     Number of Times Moved in the Last Year: 1     Homeless in the Last Year: No       Family  "Hx:  No family history on file.    Review of Systems:   Review of Systems    Vitals:    12/07/24 1345 12/07/24 1404 12/07/24 1423 12/07/24 1521   BP: (!) 172/95 (!) 190/125 (!) 190/125 (!) 184/96   BP Location:   Right arm    Patient Position:   Sitting    Pulse: 78 82 85 75   Resp: 18  18    Temp:  35 °C (95 °F) 36.5 °C (97.7 °F)    TempSrc:   Temporal    SpO2: 96% 94% 94%    Weight:   103 kg (226 lb 13.7 oz)    Height:   1.727 m (5' 7.99\")        Intake/Output Summary (Last 24 hours) at 12/7/2024 1557  Last data filed at 12/7/2024 1336  Gross per 24 hour   Intake 300 ml   Output --   Net 300 ml      Wt Readings from Last 4 Encounters:   12/07/24 103 kg (226 lb 13.7 oz)      Physical Examination:       Physical Exam    LABS:  CBC:   Results from last 7 days   Lab Units 12/07/24  1032   WBC AUTO x10*3/uL 9.1   RBC AUTO x10*6/uL 4.73   HEMOGLOBIN g/dL 14.7   HEMATOCRIT % 43.0   MCV fL 91   MCH pg 31.1   MCHC g/dL 34.2   RDW % 12.8   PLATELETS AUTO x10*3/uL 214     CMP:    Results from last 7 days   Lab Units 12/07/24  1032   SODIUM mmol/L 137   POTASSIUM mmol/L 3.7   CHLORIDE mmol/L 103   CO2 mmol/L 26   BUN mg/dL 11   CREATININE mg/dL 0.83   GLUCOSE mg/dL 120*   PROTEIN TOTAL g/dL 7.3   CALCIUM mg/dL 8.4*   BILIRUBIN TOTAL mg/dL 0.6   ALK PHOS U/L 76   AST U/L 19   ALT U/L 19     BMP:    Results from last 7 days   Lab Units 12/07/24  1032   SODIUM mmol/L 137   POTASSIUM mmol/L 3.7   CHLORIDE mmol/L 103   CO2 mmol/L 26   BUN mg/dL 11   CREATININE mg/dL 0.83   CALCIUM mg/dL 8.4*   GLUCOSE mg/dL 120*     Magnesium:  Results from last 7 days   Lab Units 12/07/24  1032   MAGNESIUM mg/dL 1.73     Troponin:    Results from last 7 days   Lab Units 12/07/24  1207 12/07/24  1032   TROPHS ng/L 88* 93*     BNP:   Results from last 7 days   Lab Units 12/07/24  1032   BNP pg/mL 224*     Lipid Panel:  Results from last 7 days   Lab Units 12/07/24  1032   HDL mg/dL 30.5   CHOLESTEROL/HDL RATIO  5.0   VLDL mg/dL 20   TRIGLYCERIDES " mg/dL 101   NON HDL CHOL. mg/dL 124        Current Medications:  [START ON 12/8/2024] amLODIPine, 10 mg, oral, Daily  [START ON 12/8/2024] aspirin, 81 mg, oral, Daily  atorvastatin, 40 mg, oral, Nightly  [START ON 12/8/2024] azithromycin, 500 mg, oral, q24h STERLING  [START ON 12/8/2024] cefTRIAXone, 2 g, intravenous, q24h  ipratropium-albuteroL, 3 mL, nebulization, q6h  methylPREDNISolone sodium succinate (PF), 40 mg, intravenous, q12h  nicotine, 1 patch, transdermal, Daily       heparin, 0-4,000 Units/hr, Last Rate: 1,000 Units/hr (12/07/24 1456)       No current outpatient medications     CT angio chest for pulmonary embolism    Result Date: 12/7/2024  Interpreted By:  Valente Walters, STUDY: CT ANGIO CHEST FOR PULMONARY EMBOLISM;  12/7/2024 11:35 am   INDICATION: Signs/Symptoms:SOB elevated dimer.     COMPARISON: Two view chest radiograph, same day, 7 December 2024; CT chest with contrast 4 September 2023; other chest CTs back to the oldest available, with contrast 5 July 2008   ACCESSION NUMBER(S): [id number]   ORDERING CLINICIAN: AKYLYNN XAVIER   TECHNIQUE: Pulmonary arterial phase CT chest after the uneventful administration of 75 mL IV contrast (Omnipaque 350).   Three dimensional maximum intensity projection (3-D MIPs) image/s were created on a separate dedicated workstation, reviewed and saved   FINDINGS: CARDIOVASCULAR:   Acute pulmonary embolism: Negative through the  lobar (second order) branch level. Substantial sized branches from segmental (third order) branch and distally cannot be evaluated Acute right heart strain: Negative. No CT evidence of acute right heart strain Cardiac thrombus:  Negative; no obvious right heart or other cardiac thrombus is seen Pulmonary arteries ectasia:  Negative   Heart size:  Within normal limits Pericardial effusion:  Negative   Thoracic aortic aneurysm:  Negative Aortic dissection:  Negative   Heart failure change:  Negative.  No sign of interstitial or alveolar edema.  Other:  n/a   NONVASCULAR MEDIASTINUM: Esophagus:  Grossly normal by CT Mediastinal Mass:  Negative Hiatal hernia:  None Other:  n/a   LYMPH NODES: No thoracic adenopathy   LUNGS / AIRSPACES / AIRWAYS:   LARGE AIRWAYS Filling defect: Negative Wall thickening: Negative Bronchiectasis: Negative Other: N/A   AIRSPACES Fibrosis: Negative Emphysema: Negative Consolidation: In the middle lobe from the hilus through some of the medial segment Ground glass airspace disease: Patchy clustered nodules with surrounding airspace ground-glass involving all lobes and at the periphery of the middle lobe consolidation Edema: Negative Nodule / Mass: Negative Other: N/A   PLEURA: Effusion:  Both sides negative Pneumothorax:  Both sides negative Other:  n/a   CHEST WALL: Soft tissues of the chest wall are unremarkable   SKELETON: No acute or contributory abnormality   UPPER ABDOMEN: 3 cm enhancing right lobe liver lesion series 401, image 315 for example morphologically is reminiscent of a hemangioma, at a level potentially not included in the field of view on any of the prior chest CTs. There is also a small, simple hepatic cyst on same series image 175, unchanged from prior CT       CARITO CONSOLIDATION EXTENDING FROM THE HILUS THROUGH THE MEDIAL SEGMENT MIDDLE LOBE SUSPECTED PNEUMONIA   OTHER LESS CONSOLIDATIVE AIRSPACE DISEASE IN ALL OF THE OTHER LOBES AND AT THE PERIPHERY OF THE MIDDLE LOBE CONSOLIDATION. THESE FINDINGS ARE CLUSTERS OF VARIABLY SIZED SOLID AND GROUND-GLASS COMPOSITION NODULES WITH INTERVENING HAZY GROUND-GLASS AIRSPACE DISEASE THROUGHOUT THE REGIONS OF THE CLUSTER OF NODULES, SUSPECT FOR PNEUMONIA IF A DIFFERENT VARIETY OR PERHAPS ONLY EARLY CONSOLIDATIVE   NO OTHER ACUTE DISEASE IN THE CHEST   NO ACUTE PULMONARY EMBOLISM THROUGH THE LOBAR BRANCH LEVEL   NO AORTIC DISSECTION OR OTHER ACUTE THORACIC AORTIC FINDINGS   NO PLEURAL OR PERICARDIAL EFFUSION   NO PNEUMOTHORAX   MACRO: None   Signed by: Valente Walters  12/7/2024 11:54 AM Dictation workstation:   [id number]    ECG 12 lead    Result Date: 12/7/2024  Normal sinus rhythm Left anterior fascicular block Left ventricular hypertrophy with repolarization abnormality Cannot rule out Septal infarct (cited on or before 07-DEC-2024) Abnormal ECG When compared with ECG of 07-DEC-2024 10:08, (unconfirmed) Serial changes of Septal infarct Present    ECG 12 lead    Result Date: 12/7/2024  Normal sinus rhythm Left anterior fascicular block Septal infarct (cited on or before 07-DEC-2024) T wave abnormality, consider lateral ischemia Abnormal ECG When compared with ECG of 04-SEP-2023 22:12, Previous ECG has undetermined rhythm, needs review Left anterior fascicular block is now Present Serial changes of Septal infarct Present    XR chest 2 views    Result Date: 12/7/2024  Interpreted By:  Lizy Gamez, STUDY: XR CHEST 2 VIEWS;  12/7/2024 10:20 am   INDICATION: Signs/Symptoms:Chest Pain.   COMPARISON: 01/13/2019   ACCESSION NUMBER(S): [id number]   ORDERING CLINICIAN: KAYLYNN XAVIER   FINDINGS: CARDIOMEDIASTINAL SILHOUETTE: Cardiomediastinal silhouette is normal in size and configuration.     LUNGS: There are bilateral perihilar infiltrates. There is no pleural fluid.   ABDOMEN: No remarkable upper abdominal findings.     BONES: No acute osseous changes.       Bilateral perihilar infiltrates.   MACRO: None   Signed by: Lizy Gamez 12/7/2024 10:31 AM Dictation workstation:   [id number]       No results found for this or any previous visit from the past 1095 days.                 Encounter Date: 12/07/24   ECG 12 lead   Result Value    Ventricular Rate 80    Atrial Rate 80    AL Interval 144    QRS Duration 102    QT Interval 380    QTC Calculation(Bazett) 438    P Axis 59    R Axis -53    T Axis 106    QRS Count 13    Q Onset 222    P Onset 150    P Offset 204    T Offset 412    QTC Fredericia 418    Narrative    Normal sinus rhythm  Left anterior fascicular block  Left  ventricular hypertrophy with repolarization abnormality  Cannot rule out Septal infarct (cited on or before 07-DEC-2024)  Abnormal ECG  When compared with ECG of 07-DEC-2024 10:08, (unconfirmed)  Serial changes of Septal infarct Present        Tele monitoring: Normal sinus rhythm average 75        Assessment:    Patient Active Problem List   Diagnosis    Dyspnea, unspecified type          Plan:  Angina: He has chronic angina over the last several months.  Currently with some chest pain but some of this is pleuritic related to his pneumonia.  Some of the angina may also be related to marked hypertension from not taking medications.  Will adjust medications for better blood pressure control.  He has significant wheezing and will avoid beta-blocker in the short-term.  Will obtain echocardiogram to help assess for any segmental wall motion normality the study in 2021 was unremarkable.  Once the pneumonia and blood pressure are controlled we will consider coronary angiography this coming week  Elevated troponins: It is a plateau pattern.  Type II MI secondary to probable hypoxia from his pneumonia.  No EKG changes to suggest acute plaque rupture and the pattern is not suggestive of acute plaque rupture.  Agree with heparin for now given that he does have intermittent resting chest discomfort.  Hypertension: Longstanding with hospitalizations in the past.  Would utilize AR-2 blocker/diuretic/calcium blocker for now.  Obtain echo to assess for LV function.  Currently amlodipine just begun.  Would add valsartan/HCTZ.    Will also add nitrate for his angina.  Continue statin aspirin  COPD/tobacco abuse per medicine service              Electronically signed by Nicholas Keenan MD on 12/7/2024 at 3:57 PM

## 2024-12-07 NOTE — H&P (VIEW-ONLY)
Cardiology Consult Note  Patient: Gustavo Foreman  Unit/Bed: 810/810-A  YOB: 1970  MRN: [id number]  Acct: [id number]   Admitting Diagnosis: Elevated troponin [R79.89]  Pneumonia of both lower lobes due to infectious organism [J18.9]  Dyspnea, unspecified type [R06.00]  Date:  12/7/2024  Hospital Day: 0  Attending: Bud Haddad MD    Rounding cardiologist:  Nicholas Keenan MD,    Consultant: Dr. Nicholas Keenan     Primary Cardiologist:  None       Complaint:  Chief Complaint   Patient presents with    Shortness of Breath     Pt thinks he has pneumonia cough, congestion sob and coughing up blood         History of Present Illness:  54-year-old man without known coronary artery disease emergency department earlier today with shortness of breath and cough.  Family member recently diagnosed with pneumonia.  Also with diaphoresis.  Continues to smoke a pack of cigarettes a day.  Potentially some hemoptysis.  CT of the chest did not show evidence of PE but significant pneumonia consolidation of the right lobe.    Review of ECG: Lateral T inversion unchanged in comparison to study of 1 year ago.  Sinus rhythm    Troponin 85-95 Plateau/ BNP slight elevation to 24.  LDL cholesterol 103 normal renal and liver function.  Normal electrolytes normal CBC  Tox screen with evidence of marijuana otherwise negative    Currently with cough wheezing slightly diaphoretic ever since he presented.  He said on Thursday he suddenly felt very poorly coughing hard to breathe chest pain.  He also notes anterior precordial tightness if he does very much.  He works doing drywall and construction sites.  He says lately he has had to move slower.  He will have a crescendo decrescendo chest pressure resolves when he does not work quite as hard.  He also has not been on blood pressure medicines for quite some time.  He has had no palpitation, lightheadedness or syncope.  He has not had known prior myocardial infarction, stroke or  peripheral vascular disease.  He hurt his ankle when he fell through a hole in a construction site but has had no claudication.    Past cardiac history  Hypertension  2021 hospitalized at [facility] with chest discomfort.  At that time describes having extensive drug addiction history meth and cocaine just a few months prior to that admission.  Was smoking at that time as well.  ECG showed lateral T inversion negative troponins.  Blood pressure was poorly controlled cardiac enzymes were negative it was felt chest pain noncardiac in etiology.  Remote DVT/PE  Echocardiogram  mild LVH otherwise unremarkable    Past medical history  Prior drug abuse cocaine/meth  Tobacco abuse  COPD pulmonary function studies  FEV1 74% predicted.    Fam Hx  Maternal grandparents mother relatives with coronary disease  Father  in his 40s from aortic rupture  Hypertension    Social history  Continues to smoke, no longer abuse illicit drugs      Allergies:  Allergies   Allergen Reactions    Bee Venom Protein (Honey Bee) Anaphylaxis    Lisinopril Unknown     Nausea    Codeine Rash        PMHx:  Past Medical History:   Diagnosis Date    Personal history of other diseases of the respiratory system     History of chronic obstructive lung disease    Personal history of other infectious and parasitic diseases     History of Legionnaire's disease    Personal history of other venous thrombosis and embolism     History of deep venous thrombosis    Personal history of pulmonary embolism     History of pulmonary embolism    Personal history of suicidal behavior     History of suicide attempt       PSHx:  Past Surgical History:   Procedure Laterality Date    HAND SURGERY  2016    Hand Surgery                                                                                                                                                             Social Hx:  Social History     Socioeconomic History    Marital  status:    Tobacco Use    Smoking status: Every Day     Current packs/day: 2.00     Average packs/day: 2.0 packs/day for 30.9 years (61.9 ttl pk-yrs)     Types: Cigarettes     Start date: 1994    Smokeless tobacco: Never     Social Drivers of Health     Financial Resource Strain: Low Risk  (12/7/2024)    Overall Financial Resource Strain (CARDIA)     Difficulty of Paying Living Expenses: Not hard at all   Food Insecurity: No Food Insecurity (12/7/2024)    Hunger Vital Sign     Worried About Running Out of Food in the Last Year: Never true     Ran Out of Food in the Last Year: Never true   Transportation Needs: No Transportation Needs (12/7/2024)    PRAPARE - Transportation     Lack of Transportation (Medical): No     Lack of Transportation (Non-Medical): No   Physical Activity: Patient Declined (12/7/2024)    Exercise Vital Sign     Days of Exercise per Week: Patient declined     Minutes of Exercise per Session: Patient declined   Stress: No Stress Concern Present (12/7/2024)    [employer] - Occupational Stress Questionnaire     Feeling of Stress : Not at all   Social Connections: Unknown (12/7/2024)    Social Connection and Isolation Panel [NHANES]     Frequency of Communication with Friends and Family: Never     Frequency of Social Gatherings with Friends and Family: Never     Attends Alevism Services: Never     Active Member of Clubs or Organizations: No     Attends Club or Organization Meetings: Never     Marital Status: Patient declined   Intimate Partner Violence: Not At Risk (12/7/2024)    Humiliation, Afraid, Rape, and Kick questionnaire     Fear of Current or Ex-Partner: No     Emotionally Abused: No     Physically Abused: No     Sexually Abused: No   Housing Stability: Low Risk  (12/7/2024)    Housing Stability Vital Sign     Unable to Pay for Housing in the Last Year: No     Number of Times Moved in the Last Year: 1     Homeless in the Last Year: No       Family  "Hx:  No family history on file.    Review of Systems:   Review of Systems    Vitals:    12/07/24 1345 12/07/24 1404 12/07/24 1423 12/07/24 1521   BP: (!) 172/95 (!) 190/125 (!) 190/125 (!) 184/96   BP Location:   Right arm    Patient Position:   Sitting    Pulse: 78 82 85 75   Resp: 18  18    Temp:  35 °C (95 °F) 36.5 °C (97.7 °F)    TempSrc:   Temporal    SpO2: 96% 94% 94%    Weight:   103 kg (226 lb 13.7 oz)    Height:   1.727 m (5' 7.99\")        Intake/Output Summary (Last 24 hours) at 12/7/2024 1557  Last data filed at 12/7/2024 1336  Gross per 24 hour   Intake 300 ml   Output --   Net 300 ml      Wt Readings from Last 4 Encounters:   12/07/24 103 kg (226 lb 13.7 oz)      Physical Examination:       Physical Exam    LABS:  CBC:   Results from last 7 days   Lab Units 12/07/24  1032   WBC AUTO x10*3/uL 9.1   RBC AUTO x10*6/uL 4.73   HEMOGLOBIN g/dL 14.7   HEMATOCRIT % 43.0   MCV fL 91   MCH pg 31.1   MCHC g/dL 34.2   RDW % 12.8   PLATELETS AUTO x10*3/uL 214     CMP:    Results from last 7 days   Lab Units 12/07/24  1032   SODIUM mmol/L 137   POTASSIUM mmol/L 3.7   CHLORIDE mmol/L 103   CO2 mmol/L 26   BUN mg/dL 11   CREATININE mg/dL 0.83   GLUCOSE mg/dL 120*   PROTEIN TOTAL g/dL 7.3   CALCIUM mg/dL 8.4*   BILIRUBIN TOTAL mg/dL 0.6   ALK PHOS U/L 76   AST U/L 19   ALT U/L 19     BMP:    Results from last 7 days   Lab Units 12/07/24  1032   SODIUM mmol/L 137   POTASSIUM mmol/L 3.7   CHLORIDE mmol/L 103   CO2 mmol/L 26   BUN mg/dL 11   CREATININE mg/dL 0.83   CALCIUM mg/dL 8.4*   GLUCOSE mg/dL 120*     Magnesium:  Results from last 7 days   Lab Units 12/07/24  1032   MAGNESIUM mg/dL 1.73     Troponin:    Results from last 7 days   Lab Units 12/07/24  1207 12/07/24  1032   TROPHS ng/L 88* 93*     BNP:   Results from last 7 days   Lab Units 12/07/24  1032   BNP pg/mL 224*     Lipid Panel:  Results from last 7 days   Lab Units 12/07/24  1032   HDL mg/dL 30.5   CHOLESTEROL/HDL RATIO  5.0   VLDL mg/dL 20   TRIGLYCERIDES " mg/dL 101   NON HDL CHOL. mg/dL 124        Current Medications:  [START ON 12/8/2024] amLODIPine, 10 mg, oral, Daily  [START ON 12/8/2024] aspirin, 81 mg, oral, Daily  atorvastatin, 40 mg, oral, Nightly  [START ON 12/8/2024] azithromycin, 500 mg, oral, q24h STERLING  [START ON 12/8/2024] cefTRIAXone, 2 g, intravenous, q24h  ipratropium-albuteroL, 3 mL, nebulization, q6h  methylPREDNISolone sodium succinate (PF), 40 mg, intravenous, q12h  nicotine, 1 patch, transdermal, Daily       heparin, 0-4,000 Units/hr, Last Rate: 1,000 Units/hr (12/07/24 1456)       No current outpatient medications     CT angio chest for pulmonary embolism    Result Date: 12/7/2024  Interpreted By:  Valente Walters, STUDY: CT ANGIO CHEST FOR PULMONARY EMBOLISM;  12/7/2024 11:35 am   INDICATION: Signs/Symptoms:SOB elevated dimer.     COMPARISON: Two view chest radiograph, same day, 7 December 2024; CT chest with contrast 4 September 2023; other chest CTs back to the oldest available, with contrast 5 July 2008   ACCESSION NUMBER(S): [id number]   ORDERING CLINICIAN: KAYLYNN XAVIER   TECHNIQUE: Pulmonary arterial phase CT chest after the uneventful administration of 75 mL IV contrast (Omnipaque 350).   Three dimensional maximum intensity projection (3-D MIPs) image/s were created on a separate dedicated workstation, reviewed and saved   FINDINGS: CARDIOVASCULAR:   Acute pulmonary embolism: Negative through the  lobar (second order) branch level. Substantial sized branches from segmental (third order) branch and distally cannot be evaluated Acute right heart strain: Negative. No CT evidence of acute right heart strain Cardiac thrombus:  Negative; no obvious right heart or other cardiac thrombus is seen Pulmonary arteries ectasia:  Negative   Heart size:  Within normal limits Pericardial effusion:  Negative   Thoracic aortic aneurysm:  Negative Aortic dissection:  Negative   Heart failure change:  Negative.  No sign of interstitial or alveolar edema.  Other:  n/a   NONVASCULAR MEDIASTINUM: Esophagus:  Grossly normal by CT Mediastinal Mass:  Negative Hiatal hernia:  None Other:  n/a   LYMPH NODES: No thoracic adenopathy   LUNGS / AIRSPACES / AIRWAYS:   LARGE AIRWAYS Filling defect: Negative Wall thickening: Negative Bronchiectasis: Negative Other: N/A   AIRSPACES Fibrosis: Negative Emphysema: Negative Consolidation: In the middle lobe from the hilus through some of the medial segment Ground glass airspace disease: Patchy clustered nodules with surrounding airspace ground-glass involving all lobes and at the periphery of the middle lobe consolidation Edema: Negative Nodule / Mass: Negative Other: N/A   PLEURA: Effusion:  Both sides negative Pneumothorax:  Both sides negative Other:  n/a   CHEST WALL: Soft tissues of the chest wall are unremarkable   SKELETON: No acute or contributory abnormality   UPPER ABDOMEN: 3 cm enhancing right lobe liver lesion series 401, image 315 for example morphologically is reminiscent of a hemangioma, at a level potentially not included in the field of view on any of the prior chest CTs. There is also a small, simple hepatic cyst on same series image 175, unchanged from prior CT       CARITO CONSOLIDATION EXTENDING FROM THE HILUS THROUGH THE MEDIAL SEGMENT MIDDLE LOBE SUSPECTED PNEUMONIA   OTHER LESS CONSOLIDATIVE AIRSPACE DISEASE IN ALL OF THE OTHER LOBES AND AT THE PERIPHERY OF THE MIDDLE LOBE CONSOLIDATION. THESE FINDINGS ARE CLUSTERS OF VARIABLY SIZED SOLID AND GROUND-GLASS COMPOSITION NODULES WITH INTERVENING HAZY GROUND-GLASS AIRSPACE DISEASE THROUGHOUT THE REGIONS OF THE CLUSTER OF NODULES, SUSPECT FOR PNEUMONIA IF A DIFFERENT VARIETY OR PERHAPS ONLY EARLY CONSOLIDATIVE   NO OTHER ACUTE DISEASE IN THE CHEST   NO ACUTE PULMONARY EMBOLISM THROUGH THE LOBAR BRANCH LEVEL   NO AORTIC DISSECTION OR OTHER ACUTE THORACIC AORTIC FINDINGS   NO PLEURAL OR PERICARDIAL EFFUSION   NO PNEUMOTHORAX   MACRO: None   Signed by: Valente Walters  12/7/2024 11:54 AM Dictation workstation:   [id number]    ECG 12 lead    Result Date: 12/7/2024  Normal sinus rhythm Left anterior fascicular block Left ventricular hypertrophy with repolarization abnormality Cannot rule out Septal infarct (cited on or before 07-DEC-2024) Abnormal ECG When compared with ECG of 07-DEC-2024 10:08, (unconfirmed) Serial changes of Septal infarct Present    ECG 12 lead    Result Date: 12/7/2024  Normal sinus rhythm Left anterior fascicular block Septal infarct (cited on or before 07-DEC-2024) T wave abnormality, consider lateral ischemia Abnormal ECG When compared with ECG of 04-SEP-2023 22:12, Previous ECG has undetermined rhythm, needs review Left anterior fascicular block is now Present Serial changes of Septal infarct Present    XR chest 2 views    Result Date: 12/7/2024  Interpreted By:  Lizy Gamez, STUDY: XR CHEST 2 VIEWS;  12/7/2024 10:20 am   INDICATION: Signs/Symptoms:Chest Pain.   COMPARISON: 01/13/2019   ACCESSION NUMBER(S): [id number]   ORDERING CLINICIAN: KAYLYNN XAVIER   FINDINGS: CARDIOMEDIASTINAL SILHOUETTE: Cardiomediastinal silhouette is normal in size and configuration.     LUNGS: There are bilateral perihilar infiltrates. There is no pleural fluid.   ABDOMEN: No remarkable upper abdominal findings.     BONES: No acute osseous changes.       Bilateral perihilar infiltrates.   MACRO: None   Signed by: Lizy Gamez 12/7/2024 10:31 AM Dictation workstation:   [id number]       No results found for this or any previous visit from the past 1095 days.                 Encounter Date: 12/07/24   ECG 12 lead   Result Value    Ventricular Rate 80    Atrial Rate 80    NV Interval 144    QRS Duration 102    QT Interval 380    QTC Calculation(Bazett) 438    P Axis 59    R Axis -53    T Axis 106    QRS Count 13    Q Onset 222    P Onset 150    P Offset 204    T Offset 412    QTC Fredericia 418    Narrative    Normal sinus rhythm  Left anterior fascicular block  Left  ventricular hypertrophy with repolarization abnormality  Cannot rule out Septal infarct (cited on or before 07-DEC-2024)  Abnormal ECG  When compared with ECG of 07-DEC-2024 10:08, (unconfirmed)  Serial changes of Septal infarct Present        Tele monitoring: Normal sinus rhythm average 75        Assessment:    Patient Active Problem List   Diagnosis    Dyspnea, unspecified type          Plan:  Angina: He has chronic angina over the last several months.  Currently with some chest pain but some of this is pleuritic related to his pneumonia.  Some of the angina may also be related to marked hypertension from not taking medications.  Will adjust medications for better blood pressure control.  He has significant wheezing and will avoid beta-blocker in the short-term.  Will obtain echocardiogram to help assess for any segmental wall motion normality the study in 2021 was unremarkable.  Once the pneumonia and blood pressure are controlled we will consider coronary angiography this coming week  Elevated troponins: It is a plateau pattern.  Type II MI secondary to probable hypoxia from his pneumonia.  No EKG changes to suggest acute plaque rupture and the pattern is not suggestive of acute plaque rupture.  Agree with heparin for now given that he does have intermittent resting chest discomfort.  Hypertension: Longstanding with hospitalizations in the past.  Would utilize AR-2 blocker/diuretic/calcium blocker for now.  Obtain echo to assess for LV function.  Currently amlodipine just begun.  Would add valsartan/HCTZ.    Will also add nitrate for his angina.  Continue statin aspirin  COPD/tobacco abuse per medicine service              Electronically signed by Nicholas Keenan MD on 12/7/2024 at 3:57 PM

## 2024-12-07 NOTE — CARE PLAN
The patient's goals for the shift include      The clinical goals for the shift include patient will remain hemodynamically stable

## 2024-12-07 NOTE — Clinical Note
Report was give by FRANCHESCA Cole to FRANCHESCA Mims in [facility].

## 2024-12-08 ENCOUNTER — APPOINTMENT (OUTPATIENT)
Dept: CARDIOLOGY | Facility: HOSPITAL | Age: 54
End: 2024-12-08
Payer: COMMERCIAL

## 2024-12-08 VITALS
TEMPERATURE: 98.2 F | RESPIRATION RATE: 18 BRPM | WEIGHT: 226.85 LBS | SYSTOLIC BLOOD PRESSURE: 157 MMHG | HEIGHT: 68 IN | HEART RATE: 65 BPM | BODY MASS INDEX: 34.38 KG/M2 | OXYGEN SATURATION: 95 % | DIASTOLIC BLOOD PRESSURE: 93 MMHG

## 2024-12-08 LAB
ANION GAP SERPL CALC-SCNC: 11 MMOL/L (ref 10–20)
ATRIAL RATE: 75 BPM
BACTERIA BLD CULT: NORMAL
BACTERIA BLD CULT: NORMAL
BUN SERPL-MCNC: 20 MG/DL (ref 6–23)
CALCIUM SERPL-MCNC: 9 MG/DL (ref 8.6–10.3)
CARDIAC TROPONIN I PNL SERPL HS: 37 NG/L (ref 0–20)
CHLORIDE SERPL-SCNC: 103 MMOL/L (ref 98–107)
CO2 SERPL-SCNC: 26 MMOL/L (ref 21–32)
CREAT SERPL-MCNC: 0.8 MG/DL (ref 0.5–1.3)
EGFRCR SERPLBLD CKD-EPI 2021: >90 ML/MIN/1.73M*2
ERYTHROCYTE [DISTWIDTH] IN BLOOD BY AUTOMATED COUNT: 13.2 % (ref 11.5–14.5)
GLUCOSE SERPL-MCNC: 291 MG/DL (ref 74–99)
HCT VFR BLD AUTO: 38.7 % (ref 41–52)
HGB BLD-MCNC: 13.3 G/DL (ref 13.5–17.5)
MAGNESIUM SERPL-MCNC: 2.12 MG/DL (ref 1.6–2.4)
MCH RBC QN AUTO: 30.9 PG (ref 26–34)
MCHC RBC AUTO-ENTMCNC: 34.4 G/DL (ref 32–36)
MCV RBC AUTO: 90 FL (ref 80–100)
NRBC BLD-RTO: 0 /100 WBCS (ref 0–0)
P AXIS: 71 DEGREES
P OFFSET: 206 MS
P ONSET: 150 MS
PLATELET # BLD AUTO: 232 X10*3/UL (ref 150–450)
POTASSIUM SERPL-SCNC: 3.5 MMOL/L (ref 3.5–5.3)
PR INTERVAL: 136 MS
Q ONSET: 218 MS
QRS COUNT: 13 BEATS
QRS DURATION: 100 MS
QT INTERVAL: 414 MS
QTC CALCULATION(BAZETT): 462 MS
QTC FREDERICIA: 445 MS
R AXIS: -27 DEGREES
RBC # BLD AUTO: 4.3 X10*6/UL (ref 4.5–5.9)
SODIUM SERPL-SCNC: 136 MMOL/L (ref 136–145)
T AXIS: 123 DEGREES
T OFFSET: 425 MS
UFH PPP CHRO-ACNC: 0.1 IU/ML
UFH PPP CHRO-ACNC: 0.1 IU/ML
VENTRICULAR RATE: 75 BPM
WBC # BLD AUTO: 10.7 X10*3/UL (ref 4.4–11.3)

## 2024-12-08 PROCEDURE — 2500000004 HC RX 250 GENERAL PHARMACY W/ HCPCS (ALT 636 FOR OP/ED): Performed by: HOSPITALIST

## 2024-12-08 PROCEDURE — 85027 COMPLETE CBC AUTOMATED: CPT | Performed by: HOSPITALIST

## 2024-12-08 PROCEDURE — 93005 ELECTROCARDIOGRAM TRACING: CPT | Mod: 59

## 2024-12-08 PROCEDURE — 36415 COLL VENOUS BLD VENIPUNCTURE: CPT | Performed by: HOSPITALIST

## 2024-12-08 PROCEDURE — 2500000002 HC RX 250 W HCPCS SELF ADMINISTERED DRUGS (ALT 637 FOR MEDICARE OP, ALT 636 FOR OP/ED): Performed by: HOSPITALIST

## 2024-12-08 PROCEDURE — 99233 SBSQ HOSP IP/OBS HIGH 50: CPT | Performed by: INTERNAL MEDICINE

## 2024-12-08 PROCEDURE — 82374 ASSAY BLOOD CARBON DIOXIDE: CPT | Performed by: HOSPITALIST

## 2024-12-08 PROCEDURE — 2060000001 HC INTERMEDIATE ICU ROOM DAILY

## 2024-12-08 PROCEDURE — 93010 ELECTROCARDIOGRAM REPORT: CPT | Performed by: INTERNAL MEDICINE

## 2024-12-08 PROCEDURE — 2500000004 HC RX 250 GENERAL PHARMACY W/ HCPCS (ALT 636 FOR OP/ED): Performed by: INTERNAL MEDICINE

## 2024-12-08 PROCEDURE — 80048 BASIC METABOLIC PNL TOTAL CA: CPT | Performed by: HOSPITALIST

## 2024-12-08 PROCEDURE — S4991 NICOTINE PATCH NONLEGEND: HCPCS | Performed by: HOSPITALIST

## 2024-12-08 PROCEDURE — 2500000002 HC RX 250 W HCPCS SELF ADMINISTERED DRUGS (ALT 637 FOR MEDICARE OP, ALT 636 FOR OP/ED): Performed by: INTERNAL MEDICINE

## 2024-12-08 PROCEDURE — 2500000001 HC RX 250 WO HCPCS SELF ADMINISTERED DRUGS (ALT 637 FOR MEDICARE OP): Performed by: HOSPITALIST

## 2024-12-08 PROCEDURE — 84484 ASSAY OF TROPONIN QUANT: CPT | Performed by: HOSPITALIST

## 2024-12-08 PROCEDURE — 94640 AIRWAY INHALATION TREATMENT: CPT

## 2024-12-08 PROCEDURE — 85520 HEPARIN ASSAY: CPT | Performed by: STUDENT IN AN ORGANIZED HEALTH CARE EDUCATION/TRAINING PROGRAM

## 2024-12-08 PROCEDURE — 83735 ASSAY OF MAGNESIUM: CPT | Performed by: HOSPITALIST

## 2024-12-08 PROCEDURE — 2500000001 HC RX 250 WO HCPCS SELF ADMINISTERED DRUGS (ALT 637 FOR MEDICARE OP): Performed by: INTERNAL MEDICINE

## 2024-12-08 PROCEDURE — 36415 COLL VENOUS BLD VENIPUNCTURE: CPT | Performed by: STUDENT IN AN ORGANIZED HEALTH CARE EDUCATION/TRAINING PROGRAM

## 2024-12-08 RX ORDER — FLUTICASONE FUROATE AND VILANTEROL 100; 25 UG/1; UG/1
1 POWDER RESPIRATORY (INHALATION) DAILY
Status: DISPENSED | OUTPATIENT
Start: 2024-12-08

## 2024-12-08 RX ORDER — VALSARTAN 160 MG/1
320 TABLET ORAL DAILY
Status: ACTIVE | OUTPATIENT
Start: 2024-12-09

## 2024-12-08 RX ORDER — VALSARTAN 160 MG/1
160 TABLET ORAL ONCE
Status: COMPLETED | OUTPATIENT
Start: 2024-12-08 | End: 2024-12-08

## 2024-12-08 RX ORDER — IPRATROPIUM BROMIDE AND ALBUTEROL SULFATE 2.5; .5 MG/3ML; MG/3ML
3 SOLUTION RESPIRATORY (INHALATION) 3 TIMES DAILY
Status: DISCONTINUED | OUTPATIENT
Start: 2024-12-08 | End: 2024-12-08

## 2024-12-08 RX ORDER — CEFTRIAXONE 1 G/50ML
1 INJECTION, SOLUTION INTRAVENOUS EVERY 24 HOURS
Status: DISPENSED | OUTPATIENT
Start: 2024-12-08

## 2024-12-08 RX ORDER — IPRATROPIUM BROMIDE AND ALBUTEROL SULFATE 2.5; .5 MG/3ML; MG/3ML
3 SOLUTION RESPIRATORY (INHALATION) EVERY 6 HOURS PRN
Status: DISPENSED | OUTPATIENT
Start: 2024-12-08

## 2024-12-08 RX ORDER — HYDROCHLOROTHIAZIDE 25 MG/1
25 TABLET ORAL DAILY
Status: ACTIVE | OUTPATIENT
Start: 2024-12-09

## 2024-12-08 RX ADMIN — VALSARTAN 160 MG: 160 TABLET, FILM COATED ORAL at 12:27

## 2024-12-08 RX ADMIN — CEFTRIAXONE SODIUM 1 G: 1 INJECTION, SOLUTION INTRAVENOUS at 12:27

## 2024-12-08 RX ADMIN — LABETALOL HYDROCHLORIDE 20 MG: 5 INJECTION, SOLUTION INTRAVENOUS at 19:09

## 2024-12-08 RX ADMIN — IPRATROPIUM BROMIDE AND ALBUTEROL SULFATE 3 ML: 2.5; .5 SOLUTION RESPIRATORY (INHALATION) at 02:01

## 2024-12-08 RX ADMIN — ASPIRIN 81 MG: 81 TABLET, CHEWABLE ORAL at 08:26

## 2024-12-08 RX ADMIN — ISOSORBIDE MONONITRATE 30 MG: 30 TABLET, EXTENDED RELEASE ORAL at 08:25

## 2024-12-08 RX ADMIN — NICOTINE 1 PATCH: 21 PATCH, EXTENDED RELEASE TRANSDERMAL at 08:26

## 2024-12-08 RX ADMIN — ATORVASTATIN CALCIUM 40 MG: 20 TABLET, FILM COATED ORAL at 20:10

## 2024-12-08 RX ADMIN — AMLODIPINE BESYLATE 10 MG: 5 TABLET ORAL at 08:25

## 2024-12-08 RX ADMIN — METHYLPREDNISOLONE SODIUM SUCCINATE 40 MG: 40 INJECTION, POWDER, FOR SOLUTION INTRAMUSCULAR; INTRAVENOUS at 08:25

## 2024-12-08 RX ADMIN — IPRATROPIUM BROMIDE AND ALBUTEROL SULFATE 3 ML: 2.5; .5 SOLUTION RESPIRATORY (INHALATION) at 19:28

## 2024-12-08 RX ADMIN — AZITHROMYCIN 500 MG: 250 TABLET, FILM COATED ORAL at 08:25

## 2024-12-08 RX ADMIN — IPRATROPIUM BROMIDE AND ALBUTEROL SULFATE 3 ML: 2.5; .5 SOLUTION RESPIRATORY (INHALATION) at 07:50

## 2024-12-08 RX ADMIN — METHYLPREDNISOLONE SODIUM SUCCINATE 40 MG: 40 INJECTION, POWDER, FOR SOLUTION INTRAMUSCULAR; INTRAVENOUS at 20:10

## 2024-12-08 RX ADMIN — FLUTICASONE FUROATE AND VILANTEROL TRIFENATATE 1 PUFF: 100; 25 POWDER RESPIRATORY (INHALATION) at 12:26

## 2024-12-08 RX ADMIN — HEPARIN SODIUM 1400 UNITS/HR: 10000 INJECTION, SOLUTION INTRAVENOUS at 05:28

## 2024-12-08 RX ADMIN — VALSARTAN 160 MG: 160 TABLET, FILM COATED ORAL at 08:26

## 2024-12-08 RX ADMIN — HYDROCHLOROTHIAZIDE 12.5 MG: 12.5 TABLET ORAL at 08:25

## 2024-12-08 ASSESSMENT — COGNITIVE AND FUNCTIONAL STATUS - GENERAL
MOBILITY SCORE: 24
DAILY ACTIVITIY SCORE: 24
DAILY ACTIVITIY SCORE: 24
MOBILITY SCORE: 24

## 2024-12-08 ASSESSMENT — PAIN SCALES - GENERAL: PAINLEVEL_OUTOF10: 0 - NO PAIN

## 2024-12-08 NOTE — CARE PLAN
The patient's goals for the shift include rest     The clinical goals for the shift include patient will remain hemodynamically stable throughout shift

## 2024-12-08 NOTE — CARE PLAN
The patient's goals for the shift include      The clinical goals for the shift include patient will remain free from injury

## 2024-12-08 NOTE — PROGRESS NOTES
Cardiology Progress Note  Patient: Gustavo Foreman  Unit/Bed: 810/810-A  YOB: 1970  MRN: [id number]  Acct: [id number]   Admitting Diagnosis: Elevated troponin [R79.89]  Pneumonia of both lower lobes due to infectious organism [J18.9]  Dyspnea, unspecified type [R06.00]  Date:  12/7/2024  Hospital Day: 1  Attending: Efren Storey MD   Rounding Cardiologist:  Nicholas Keenan MD,     Primary Cardiologist:  None       Complaint:  Chief Complaint   Patient presents with    Shortness of Breath     Pt thinks he has pneumonia cough, congestion sob and coughing up blood         SUBJECTIVE    7/25/2024 feels much better today.  Does not describe angina at this point in time.  Still with considerable wheezing rhonchorous breath is able to mobilize secretions better.  No fever or chills.      7/24 initial cardiology consultation: Presents with right middle lobe pneumonia slight hemoptysis.  Chronic angina over the prior several months as well.  Severely hypertensive on presentation as well.  Felt to have type II MI secondary to hypoxia from his pneumonia.  No ECG changes to suggest plaque rupture.  Felt likely to need cath prior to discharge.      VITALS      Vitals:    12/07/24 1924 12/07/24 2345 12/08/24 0444 12/08/24 0752   BP: (!) 166/98 147/80 152/88 (!) 166/91   BP Location: Right arm Right arm Right arm    Patient Position: Sitting Lying Lying    Pulse: 73 71 71 65   Resp: 18 18 18    Temp: 36 °C (96.8 °F) 36.6 °C (97.9 °F) 36.8 °C (98.2 °F) 36 °C (96.8 °F)   TempSrc: Temporal Temporal Temporal    SpO2: 96% 93% 95% 92%   Weight:       Height:           Intake/Output Summary (Last 24 hours) at 12/8/2024 0923  Last data filed at 12/7/2024 2234  Gross per 24 hour   Intake 379.23 ml   Output --   Net 379.23 ml      Wt Readings from Last 4 Encounters:   12/07/24 103 kg (226 lb 13.7 oz)        Allergies:  Allergies   Allergen Reactions    Bee Venom Protein (Honey Bee) Anaphylaxis    Lisinopril Unknown      Nausea    Codeine Rash        PHYSICAL EXAM   Physical Exam  Constitutional:       General: He is not in acute distress.  HENT:      Mouth/Throat:      Mouth: Mucous membranes are moist.   Eyes:      Pupils: Pupils are equal, round, and reactive to light.   Cardiovascular:      Rate and Rhythm: Normal rate and regular rhythm.      Heart sounds: No murmur heard.  Pulmonary:      Breath sounds: Wheezing and rhonchi present.   Abdominal:      Palpations: Abdomen is soft.   Musculoskeletal:      Cervical back: Normal range of motion.      Right lower leg: No edema.      Left lower leg: No edema.   Neurological:      Mental Status: He is alert.           LABS   CBC:   Results from last 7 days   Lab Units 12/08/24  0449 12/07/24  1032   WBC AUTO x10*3/uL 10.7 9.1   RBC AUTO x10*6/uL 4.30* 4.73   HEMOGLOBIN g/dL 13.3* 14.7   HEMATOCRIT % 38.7* 43.0   MCV fL 90 91   MCH pg 30.9 31.1   MCHC g/dL 34.4 34.2   RDW % 13.2 12.8   PLATELETS AUTO x10*3/uL 232 214     CMP:    Results from last 7 days   Lab Units 12/08/24  0450 12/07/24  1032   SODIUM mmol/L 136 137   POTASSIUM mmol/L 3.5 3.7   CHLORIDE mmol/L 103 103   CO2 mmol/L 26 26   BUN mg/dL 20 11   CREATININE mg/dL 0.80 0.83   GLUCOSE mg/dL 291* 120*   PROTEIN TOTAL g/dL  --  7.3   CALCIUM mg/dL 9.0 8.4*   BILIRUBIN TOTAL mg/dL  --  0.6   ALK PHOS U/L  --  76   AST U/L  --  19   ALT U/L  --  19     BMP:    Results from last 7 days   Lab Units 12/08/24  0450 12/07/24  1032   SODIUM mmol/L 136 137   POTASSIUM mmol/L 3.5 3.7   CHLORIDE mmol/L 103 103   CO2 mmol/L 26 26   BUN mg/dL 20 11   CREATININE mg/dL 0.80 0.83   CALCIUM mg/dL 9.0 8.4*   GLUCOSE mg/dL 291* 120*     Magnesium:  Results from last 7 days   Lab Units 12/08/24  0450 12/07/24  1032   MAGNESIUM mg/dL 2.12 1.73     Troponin:    Results from last 7 days   Lab Units 12/08/24  0450 12/07/24  1207 12/07/24  1032   TROPHS ng/L 37* 88* 93*     BNP:   Results from last 7 days   Lab Units 12/07/24  1032   BNP pg/mL 224*      Lipid Panel:  Results from last 7 days   Lab Units 12/07/24  1032   HDL mg/dL 30.5   CHOLESTEROL/HDL RATIO  5.0   VLDL mg/dL 20   TRIGLYCERIDES mg/dL 101   NON HDL CHOL. mg/dL 124        amLODIPine, 10 mg, oral, Daily  aspirin, 81 mg, oral, Daily  atorvastatin, 40 mg, oral, Nightly  azithromycin, 500 mg, oral, q24h STERLING  cefTRIAXone, 1 g, intravenous, q24h  fluticasone furoate-vilanteroL, 1 puff, inhalation, Daily  hydroCHLOROthiazide, 12.5 mg, oral, Daily  isosorbide mononitrate ER, 30 mg, oral, Daily  methylPREDNISolone sodium succinate (PF), 40 mg, intravenous, q12h  nicotine, 1 patch, transdermal, Daily  valsartan, 160 mg, oral, Daily       heparin, 0-4,000 Units/hr, Last Rate: 1,400 Units/hr (12/08/24 0528)       No current outpatient medications     CT angio chest for pulmonary embolism    Result Date: 12/7/2024  Interpreted By:  Valente Walters, STUDY: CT ANGIO CHEST FOR PULMONARY EMBOLISM;  12/7/2024 11:35 am   INDICATION: Signs/Symptoms:SOB elevated dimer.     COMPARISON: Two view chest radiograph, same day, 7 December 2024; CT chest with contrast 4 September 2023; other chest CTs back to the oldest available, with contrast 5 July 2008   ACCESSION NUMBER(S): [id number]   ORDERING CLINICIAN: KAYLYNN XAVIER   TECHNIQUE: Pulmonary arterial phase CT chest after the uneventful administration of 75 mL IV contrast (Omnipaque 350).   Three dimensional maximum intensity projection (3-D MIPs) image/s were created on a separate dedicated workstation, reviewed and saved   FINDINGS: CARDIOVASCULAR:   Acute pulmonary embolism: Negative through the  lobar (second order) branch level. Substantial sized branches from segmental (third order) branch and distally cannot be evaluated Acute right heart strain: Negative. No CT evidence of acute right heart strain Cardiac thrombus:  Negative; no obvious right heart or other cardiac thrombus is seen Pulmonary arteries ectasia:  Negative   Heart size:  Within normal limits  Pericardial effusion:  Negative   Thoracic aortic aneurysm:  Negative Aortic dissection:  Negative   Heart failure change:  Negative.  No sign of interstitial or alveolar edema. Other:  n/a   NONVASCULAR MEDIASTINUM: Esophagus:  Grossly normal by CT Mediastinal Mass:  Negative Hiatal hernia:  None Other:  n/a   LYMPH NODES: No thoracic adenopathy   LUNGS / AIRSPACES / AIRWAYS:   LARGE AIRWAYS Filling defect: Negative Wall thickening: Negative Bronchiectasis: Negative Other: N/A   AIRSPACES Fibrosis: Negative Emphysema: Negative Consolidation: In the middle lobe from the hilus through some of the medial segment Ground glass airspace disease: Patchy clustered nodules with surrounding airspace ground-glass involving all lobes and at the periphery of the middle lobe consolidation Edema: Negative Nodule / Mass: Negative Other: N/A   PLEURA: Effusion:  Both sides negative Pneumothorax:  Both sides negative Other:  n/a   CHEST WALL: Soft tissues of the chest wall are unremarkable   SKELETON: No acute or contributory abnormality   UPPER ABDOMEN: 3 cm enhancing right lobe liver lesion series 401, image 315 for example morphologically is reminiscent of a hemangioma, at a level potentially not included in the field of view on any of the prior chest CTs. There is also a small, simple hepatic cyst on same series image 175, unchanged from prior CT       CARITO CONSOLIDATION EXTENDING FROM THE HILUS THROUGH THE MEDIAL SEGMENT MIDDLE LOBE SUSPECTED PNEUMONIA   OTHER LESS CONSOLIDATIVE AIRSPACE DISEASE IN ALL OF THE OTHER LOBES AND AT THE PERIPHERY OF THE MIDDLE LOBE CONSOLIDATION. THESE FINDINGS ARE CLUSTERS OF VARIABLY SIZED SOLID AND GROUND-GLASS COMPOSITION NODULES WITH INTERVENING HAZY GROUND-GLASS AIRSPACE DISEASE THROUGHOUT THE REGIONS OF THE CLUSTER OF NODULES, SUSPECT FOR PNEUMONIA IF A DIFFERENT VARIETY OR PERHAPS ONLY EARLY CONSOLIDATIVE   NO OTHER ACUTE DISEASE IN THE CHEST   NO ACUTE PULMONARY EMBOLISM THROUGH THE LOBAR  BRANCH LEVEL   NO AORTIC DISSECTION OR OTHER ACUTE THORACIC AORTIC FINDINGS   NO PLEURAL OR PERICARDIAL EFFUSION   NO PNEUMOTHORAX   MACRO: None   Signed by: Valente Romeo 12/7/2024 11:54 AM Dictation workstation:   [id number]    ECG 12 lead    Result Date: 12/7/2024  Normal sinus rhythm Left anterior fascicular block Left ventricular hypertrophy with repolarization abnormality Cannot rule out Septal infarct (cited on or before 07-DEC-2024) Abnormal ECG When compared with ECG of 07-DEC-2024 10:08, (unconfirmed) Serial changes of Septal infarct Present    ECG 12 lead    Result Date: 12/7/2024  Normal sinus rhythm Left anterior fascicular block Septal infarct (cited on or before 07-DEC-2024) T wave abnormality, consider lateral ischemia Abnormal ECG When compared with ECG of 04-SEP-2023 22:12, Previous ECG has undetermined rhythm, needs review Left anterior fascicular block is now Present Serial changes of Septal infarct Present    XR chest 2 views    Result Date: 12/7/2024  Interpreted By:  Lizy Gamez, STUDY: XR CHEST 2 VIEWS;  12/7/2024 10:20 am   INDICATION: Signs/Symptoms:Chest Pain.   COMPARISON: 01/13/2019   ACCESSION NUMBER(S): [id number]   ORDERING CLINICIAN: KAYLYNN XAVIER   FINDINGS: CARDIOMEDIASTINAL SILHOUETTE: Cardiomediastinal silhouette is normal in size and configuration.     LUNGS: There are bilateral perihilar infiltrates. There is no pleural fluid.   ABDOMEN: No remarkable upper abdominal findings.     BONES: No acute osseous changes.       Bilateral perihilar infiltrates.   MACRO: None   Signed by: Lizy Gamez 12/7/2024 10:31 AM Dictation workstation:   [id number]       No results found for this or any previous visit from the past 1095 days.                 Encounter Date: 12/07/24   ECG 12 lead   Result Value    Ventricular Rate 80    Atrial Rate 80    WY Interval 144    QRS Duration 102    QT Interval 380    QTC Calculation(Bazett) 438    P Axis 59    R Axis -53    T Axis 106     QRS Count 13    Q Onset 222    P Onset 150    P Offset 204    T Offset 412    QTC Fredericia 418    Narrative    Normal sinus rhythm  Left anterior fascicular block  Left ventricular hypertrophy with repolarization abnormality  Cannot rule out Septal infarct (cited on or before 07-DEC-2024)  Abnormal ECG  When compared with ECG of 07-DEC-2024 10:08, (unconfirmed)  Serial changes of Septal infarct Present        Tele Monitoring: Normal sinus rhythm    Assessment     Patient Active Problem List   Diagnosis    Dyspnea, unspecified type          Plan:  Prior angina: None currently.  Troponin pattern is not suspicious for any acute plaque rupture event and EKG did not show acute ST changes.  Discontinue heparin at this time.  I believe he definitely has coronary disease may consider coronary angiography prior to discharge depending upon clinical course  Hypertension: Remains poorly controlled increase valsartan to 320 thiazide diuretic to 25 as he is actively wheezing avoid beta-blocker for now no absolute indication  Hyperlipidemia: Continue statin therapy  Could be on telemetry from cardiac standpoint  Pneumonia/COPD: Considerable bronchospasm still.  Check ecg am  Echo to be done tomorrow          Electronically signed by Nicholas Keenan MD on 12/8/2024 at 9:23 AM

## 2024-12-08 NOTE — PROGRESS NOTES
ASSESSMENT & PLAN:     54 year old male admitted with dyspnea secondary to acute COPD exacerbation, community acquired pneumonia and nstemi     CAP  COPDE  -continue ctx, azithro, IV steroids  -start breo-ellipta, PRN nebs  -currently on RA  -needs to quit smoking     NSTEMI  -prior to feeling sob has had chest pain on and off for the past few weeks  -trop peaked 88, down trended to 37. Currently CP free  -cardiology consulted, rec for possible cath this admission  -hep gtt  -ASA 81, high intensity statin  -Imdur  -avoiding BB for now due to acute COPDE  -TTE pending  -tele     Hypertensive urgency  -started on amlodipine 10, hydrochlorothiazide 12.5, valsartan 160, imdur 30     Nicotine abuse: smokes 2-3 packs a day, trying to cut back  -nicotine patch here     DVT ppx: heparin gtt  Dispo is home when ready    Efren Storey MD    SUBJECTIVE     NAEON. Breathing feeling better than admission. Denies CP.       OBJECTIVE:       Last Recorded Vitals:  Vitals:    12/07/24 1924 12/07/24 2345 12/08/24 0444 12/08/24 0752   BP: (!) 166/98 147/80 152/88 (!) 166/91   BP Location: Right arm Right arm Right arm    Patient Position: Sitting Lying Lying    Pulse: 73 71 71 65   Resp: 18 18 18    Temp: 36 °C (96.8 °F) 36.6 °C (97.9 °F) 36.8 °C (98.2 °F) 36 °C (96.8 °F)   TempSrc: Temporal Temporal Temporal    SpO2: 96% 93% 95% 92%   Weight:       Height:           Last I/O:  I/O last 3 completed shifts:  In: 379.2 (3.7 mL/kg) [I.V.:79.2 (0.8 mL/kg); IV Piggyback:300]  Out: - (0 mL/kg)   Weight: 102.9 kg     Physical Exam:  GEN: appearing, appears stated age, NAD  CV: RRR, no m/r/g, no LE edema  LUNGS: diffuse b/l coarse exp wheezing, no acute resp distress, on room air  ABD: soft, NT, ND, NBS  SKIN: no rashes  MSK; no gross deformities, normal joints  NEURO: A+Ox3, no FND  PSYCH: appropriate mood, affec    Inpatient Medications:  amLODIPine, 10 mg, oral, Daily  aspirin, 81 mg, oral, Daily  atorvastatin, 40 mg, oral,  Nightly  azithromycin, 500 mg, oral, q24h STERLING  cefTRIAXone, 1 g, intravenous, q24h  hydroCHLOROthiazide, 12.5 mg, oral, Daily  ipratropium-albuteroL, 3 mL, nebulization, q6h  isosorbide mononitrate ER, 30 mg, oral, Daily  methylPREDNISolone sodium succinate (PF), 40 mg, intravenous, q12h  nicotine, 1 patch, transdermal, Daily  valsartan, 160 mg, oral, Daily        PRN Medications  PRN medications: acetaminophen **OR** acetaminophen **OR** acetaminophen, acetaminophen **OR** acetaminophen **OR** acetaminophen, heparin, labetaloL, ondansetron **OR** ondansetron, polyethylene glycol    Continuous Medications:  heparin, 0-4,000 Units/hr, Last Rate: 1,400 Units/hr (12/08/24 0528)          LABS AND IMAGING:     Labs:  Results for orders placed or performed during the hospital encounter of 12/07/24 (from the past 24 hours)   POCT GLUCOSE   Result Value Ref Range    POCT Glucose 130 (H) 74 - 99 mg/dL   CBC and Auto Differential   Result Value Ref Range    WBC 9.1 4.4 - 11.3 x10*3/uL    nRBC 0.0 0.0 - 0.0 /100 WBCs    RBC 4.73 4.50 - 5.90 x10*6/uL    Hemoglobin 14.7 13.5 - 17.5 g/dL    Hematocrit 43.0 41.0 - 52.0 %    MCV 91 80 - 100 fL    MCH 31.1 26.0 - 34.0 pg    MCHC 34.2 32.0 - 36.0 g/dL    RDW 12.8 11.5 - 14.5 %    Platelets 214 150 - 450 x10*3/uL    Neutrophils % 77.0 40.0 - 80.0 %    Immature Granulocytes %, Automated 0.4 0.0 - 0.9 %    Lymphocytes % 13.2 13.0 - 44.0 %    Monocytes % 8.5 2.0 - 10.0 %    Eosinophils % 0.7 0.0 - 6.0 %    Basophils % 0.2 0.0 - 2.0 %    Neutrophils Absolute 6.97 1.20 - 7.70 x10*3/uL    Immature Granulocytes Absolute, Automated 0.04 0.00 - 0.70 x10*3/uL    Lymphocytes Absolute 1.20 1.20 - 4.80 x10*3/uL    Monocytes Absolute 0.77 0.10 - 1.00 x10*3/uL    Eosinophils Absolute 0.06 0.00 - 0.70 x10*3/uL    Basophils Absolute 0.02 0.00 - 0.10 x10*3/uL   Comprehensive Metabolic Panel   Result Value Ref Range    Glucose 120 (H) 74 - 99 mg/dL    Sodium 137 136 - 145 mmol/L    Potassium 3.7 3.5 -  5.3 mmol/L    Chloride 103 98 - 107 mmol/L    Bicarbonate 26 21 - 32 mmol/L    Anion Gap 12 10 - 20 mmol/L    Urea Nitrogen 11 6 - 23 mg/dL    Creatinine 0.83 0.50 - 1.30 mg/dL    eGFR >90 >60 mL/min/1.73m*2    Calcium 8.4 (L) 8.6 - 10.3 mg/dL    Albumin 4.0 3.4 - 5.0 g/dL    Alkaline Phosphatase 76 33 - 120 U/L    Total Protein 7.3 6.4 - 8.2 g/dL    AST 19 9 - 39 U/L    Bilirubin, Total 0.6 0.0 - 1.2 mg/dL    ALT 19 10 - 52 U/L   Magnesium   Result Value Ref Range    Magnesium 1.73 1.60 - 2.40 mg/dL   D-Dimer, Quantitative VTE Exclusion   Result Value Ref Range    D-Dimer, Quantitative VTE Exclusion 1,129 (H) <=500 ng/mL FEU   Troponin I, High Sensitivity, Initial   Result Value Ref Range    Troponin I, High Sensitivity 93 (HH) 0 - 20 ng/L   B-type natriuretic peptide   Result Value Ref Range     (H) 0 - 99 pg/mL   Lipid panel   Result Value Ref Range    Cholesterol 154 0 - 199 mg/dL    HDL-Cholesterol 30.5 mg/dL    Cholesterol/HDL Ratio 5.0     LDL Calculated 103 (H) <=99 mg/dL    VLDL 20 0 - 40 mg/dL    Triglycerides 101 0 - 149 mg/dL    Non HDL Cholesterol 124 0 - 149 mg/dL   Sars-CoV-2 PCR   Result Value Ref Range    Coronavirus 2019, PCR Not Detected Not Detected   Influenza A, and B PCR   Result Value Ref Range    Flu A Result Not Detected Not Detected    Flu B Result Not Detected Not Detected   ECG 12 lead   Result Value Ref Range    Ventricular Rate 86 BPM    Atrial Rate 86 BPM    WA Interval 140 ms    QRS Duration 92 ms    QT Interval 374 ms    QTC Calculation(Bazett) 447 ms    P Axis 64 degrees    R Axis -55 degrees    T Axis 104 degrees    QRS Count 15 beats    Q Onset 208 ms    P Onset 138 ms    P Offset 185 ms    T Offset 395 ms    QTC Fredericia 421 ms   ECG 12 lead   Result Value Ref Range    Ventricular Rate 80 BPM    Atrial Rate 80 BPM    WA Interval 144 ms    QRS Duration 102 ms    QT Interval 380 ms    QTC Calculation(Bazett) 438 ms    P Axis 59 degrees    R Axis -53 degrees    T Axis 106  degrees    QRS Count 13 beats    Q Onset 222 ms    P Onset 150 ms    P Offset 204 ms    T Offset 412 ms    QTC Fredericia 418 ms   Drug Screen, Urine With Reflex to Confirmation   Result Value Ref Range    Amphetamine Screen, Urine Presumptive Negative Presumptive Negative    Barbiturate Screen, Urine Presumptive Negative Presumptive Negative    Benzodiazepines Screen, Urine Presumptive Negative Presumptive Negative    Cannabinoid Screen, Urine Presumptive Positive (A) Presumptive Negative    Cocaine Metabolite Screen, Urine Presumptive Negative Presumptive Negative    Fentanyl Screen, Urine Presumptive Negative Presumptive Negative    Opiate Screen, Urine Presumptive Negative Presumptive Negative    Oxycodone Screen, Urine Presumptive Negative Presumptive Negative    PCP Screen, Urine Presumptive Negative Presumptive Negative    Methadone Screen, Urine Presumptive Negative Presumptive Negative   Troponin, High Sensitivity, 1 Hour   Result Value Ref Range    Troponin I, High Sensitivity 88 (HH) 0 - 20 ng/L   Urinalysis with Reflex Culture and Microscopic   Result Value Ref Range    Color, Urine Yellow Light-Yellow, Yellow, Dark-Yellow    Appearance, Urine Clear Clear    Specific Gravity, Urine 1.032 1.005 - 1.035    pH, Urine 6.5 5.0, 5.5, 6.0, 6.5, 7.0, 7.5, 8.0    Protein, Urine 30 (1+) (A) NEGATIVE, 10 (TRACE), 20 (TRACE) mg/dL    Glucose, Urine Normal Normal mg/dL    Blood, Urine NEGATIVE NEGATIVE    Ketones, Urine 10 (1+) (A) NEGATIVE mg/dL    Bilirubin, Urine NEGATIVE NEGATIVE    Urobilinogen, Urine 4 (2+) (A) Normal mg/dL    Nitrite, Urine NEGATIVE NEGATIVE    Leukocyte Esterase, Urine NEGATIVE NEGATIVE   Extra Urine Gray Tube   Result Value Ref Range    Extra Tube Hold for add-ons.    Lactate   Result Value Ref Range    Lactate 1.6 0.4 - 2.0 mmol/L   Urinalysis Microscopic   Result Value Ref Range    WBC, Urine 1-5 1-5, NONE /HPF    RBC, Urine 1-2 NONE, 1-2, 3-5 /HPF   Blood Culture    Specimen: Peripheral  Venipuncture; Blood culture   Result Value Ref Range    Blood Culture Loaded on Instrument - Culture in progress    Blood Culture    Specimen: Peripheral Venipuncture; Blood culture   Result Value Ref Range    Blood Culture Loaded on Instrument - Culture in progress    Heparin Assay   Result Value Ref Range    Heparin Unfractionated 0.1 See Comment Below for Therapeutic Ranges IU/mL   CBC   Result Value Ref Range    WBC 10.7 4.4 - 11.3 x10*3/uL    nRBC 0.0 0.0 - 0.0 /100 WBCs    RBC 4.30 (L) 4.50 - 5.90 x10*6/uL    Hemoglobin 13.3 (L) 13.5 - 17.5 g/dL    Hematocrit 38.7 (L) 41.0 - 52.0 %    MCV 90 80 - 100 fL    MCH 30.9 26.0 - 34.0 pg    MCHC 34.4 32.0 - 36.0 g/dL    RDW 13.2 11.5 - 14.5 %    Platelets 232 150 - 450 x10*3/uL   Basic Metabolic Panel   Result Value Ref Range    Glucose 291 (H) 74 - 99 mg/dL    Sodium 136 136 - 145 mmol/L    Potassium 3.5 3.5 - 5.3 mmol/L    Chloride 103 98 - 107 mmol/L    Bicarbonate 26 21 - 32 mmol/L    Anion Gap 11 10 - 20 mmol/L    Urea Nitrogen 20 6 - 23 mg/dL    Creatinine 0.80 0.50 - 1.30 mg/dL    eGFR >90 >60 mL/min/1.73m*2    Calcium 9.0 8.6 - 10.3 mg/dL   Magnesium   Result Value Ref Range    Magnesium 2.12 1.60 - 2.40 mg/dL   Troponin I, High Sensitivity   Result Value Ref Range    Troponin I, High Sensitivity 37 (H) 0 - 20 ng/L   Heparin Assay   Result Value Ref Range    Heparin Unfractionated 0.1 See Comment Below for Therapeutic Ranges IU/mL        Imaging:  CT angio chest for pulmonary embolism  Narrative: Interpreted By:  Valente Walters,   STUDY:  CT ANGIO CHEST FOR PULMONARY EMBOLISM;  12/7/2024 11:35 am      INDICATION:  Signs/Symptoms:SOB elevated dimer.          COMPARISON:  Two view chest radiograph, same day, 7 December 2024; CT chest with  contrast 4 September 2023; other chest CTs back to the oldest  available, with contrast 5 July 2008      ACCESSION NUMBER(S):  [id number]      ORDERING CLINICIAN:  KAYLYNN XAVIER      TECHNIQUE:  Pulmonary arterial phase CT  chest after the uneventful administration  of 75 mL IV contrast (Omnipaque 350).      Three dimensional maximum intensity projection (3-D MIPs) image/s  were created on a separate dedicated workstation, reviewed and saved      FINDINGS:  CARDIOVASCULAR:      Acute pulmonary embolism: Negative through the  lobar (second order)  branch level. Substantial sized branches from segmental (third order)  branch and distally cannot be evaluated Acute right heart strain:  Negative. No CT evidence of acute right heart strain Cardiac  thrombus:  Negative; no obvious right heart or other cardiac thrombus  is seen Pulmonary arteries ectasia:  Negative      Heart size:  Within normal limits  Pericardial effusion:  Negative      Thoracic aortic aneurysm:  Negative  Aortic dissection:  Negative      Heart failure change:  Negative.  No sign of interstitial or alveolar  edema. Other:  n/a      NONVASCULAR MEDIASTINUM:  Esophagus:  Grossly normal by CT  Mediastinal Mass:  Negative  Hiatal hernia:  None  Other:  n/a      LYMPH NODES:  No thoracic adenopathy      LUNGS / AIRSPACES / AIRWAYS:      LARGE AIRWAYS  Filling defect: Negative  Wall thickening: Negative  Bronchiectasis: Negative  Other: N/A      AIRSPACES  Fibrosis: Negative  Emphysema: Negative  Consolidation: In the middle lobe from the hilus through some of the  medial segment Ground glass airspace disease: Patchy clustered  nodules with surrounding airspace ground-glass involving all lobes  and at the periphery of the middle lobe consolidation Edema: Negative  Nodule / Mass: Negative  Other: N/A      PLEURA:  Effusion:  Both sides negative  Pneumothorax:  Both sides negative  Other:  n/a      CHEST WALL:  Soft tissues of the chest wall are unremarkable      SKELETON:  No acute or contributory abnormality      UPPER ABDOMEN:  3 cm enhancing right lobe liver lesion series 401, image 315 for  example morphologically is reminiscent of a hemangioma, at a level  potentially not  included in the field of view on any of the prior  chest CTs. There is also a small, simple hepatic cyst on same series  image 175, unchanged from prior CT      Impression: CARITO CONSOLIDATION EXTENDING FROM THE HILUS THROUGH THE MEDIAL  SEGMENT MIDDLE LOBE SUSPECTED PNEUMONIA      OTHER LESS CONSOLIDATIVE AIRSPACE DISEASE IN ALL OF THE OTHER LOBES  AND AT THE PERIPHERY OF THE MIDDLE LOBE CONSOLIDATION. THESE FINDINGS  ARE CLUSTERS OF VARIABLY SIZED SOLID AND GROUND-GLASS COMPOSITION  NODULES WITH INTERVENING HAZY GROUND-GLASS AIRSPACE DISEASE  THROUGHOUT THE REGIONS OF THE CLUSTER OF NODULES, SUSPECT FOR  PNEUMONIA IF A DIFFERENT VARIETY OR PERHAPS ONLY EARLY CONSOLIDATIVE      NO OTHER ACUTE DISEASE IN THE CHEST      NO ACUTE PULMONARY EMBOLISM THROUGH THE LOBAR BRANCH LEVEL      NO AORTIC DISSECTION OR OTHER ACUTE THORACIC AORTIC FINDINGS      NO PLEURAL OR PERICARDIAL EFFUSION      NO PNEUMOTHORAX      MACRO:  None      Signed by: Valente Walters 12/7/2024 11:54 AM  Dictation workstation:   [id number]  ECG 12 lead  Normal sinus rhythm  Left anterior fascicular block  Left ventricular hypertrophy with repolarization abnormality  Cannot rule out Septal infarct (cited on or before 07-DEC-2024)  Abnormal ECG  When compared with ECG of 07-DEC-2024 10:08, (unconfirmed)  Serial changes of Septal infarct Present  ECG 12 lead  Normal sinus rhythm  Left anterior fascicular block  Septal infarct (cited on or before 07-DEC-2024)  T wave abnormality, consider lateral ischemia  Abnormal ECG  When compared with ECG of 04-SEP-2023 22:12,  Previous ECG has undetermined rhythm, needs review  Left anterior fascicular block is now Present  Serial changes of Septal infarct Present  XR chest 2 views  Narrative: Interpreted By:  Lizy Gamez,   STUDY:  XR CHEST 2 VIEWS;  12/7/2024 10:20 am      INDICATION:  Signs/Symptoms:Chest Pain.      COMPARISON:  01/13/2019      ACCESSION NUMBER(S):  [id number]      ORDERING  CLINICIAN:  KAYLYNN XAVIER      FINDINGS:  CARDIOMEDIASTINAL SILHOUETTE:  Cardiomediastinal silhouette is normal in size and configuration.          LUNGS:  There are bilateral perihilar infiltrates.  There is no pleural fluid.      ABDOMEN:  No remarkable upper abdominal findings.          BONES:  No acute osseous changes.      Impression: Bilateral perihilar infiltrates.      MACRO:  None      Signed by: Lizy Gamez 12/7/2024 10:31 AM  Dictation workstation:   [id number]

## 2024-12-09 ENCOUNTER — APPOINTMENT (OUTPATIENT)
Dept: CARDIOLOGY | Facility: HOSPITAL | Age: 54
End: 2024-12-09
Payer: COMMERCIAL

## 2024-12-09 VITALS
WEIGHT: 226.85 LBS | HEIGHT: 68 IN | SYSTOLIC BLOOD PRESSURE: 179 MMHG | DIASTOLIC BLOOD PRESSURE: 106 MMHG | HEART RATE: 74 BPM | TEMPERATURE: 97.5 F | RESPIRATION RATE: 12 BRPM | BODY MASS INDEX: 34.38 KG/M2 | OXYGEN SATURATION: 99 %

## 2024-12-09 PROBLEM — R79.89 ELEVATED TROPONIN: Status: ACTIVE | Noted: 2024-12-07

## 2024-12-09 PROBLEM — I21.4 NSTEMI (NON-ST ELEVATED MYOCARDIAL INFARCTION) (MULTI): Status: ACTIVE | Noted: 2024-12-07

## 2024-12-09 LAB
ANION GAP SERPL CALC-SCNC: 12 MMOL/L (ref 10–20)
AORTIC VALVE MEAN GRADIENT: 7 MMHG
AORTIC VALVE PEAK VELOCITY: 1.79 M/S
ATRIAL RATE: 62 BPM
ATRIAL RATE: 75 BPM
AV PEAK GRADIENT: 13 MMHG
AVA (PEAK VEL): 2.23 CM2
AVA (VTI): 2.44 CM2
BUN SERPL-MCNC: 25 MG/DL (ref 6–23)
CALCIUM SERPL-MCNC: 8.7 MG/DL (ref 8.6–10.3)
CHLORIDE SERPL-SCNC: 103 MMOL/L (ref 98–107)
CO2 SERPL-SCNC: 27 MMOL/L (ref 21–32)
CREAT SERPL-MCNC: 0.76 MG/DL (ref 0.5–1.3)
EGFRCR SERPLBLD CKD-EPI 2021: >90 ML/MIN/1.73M*2
EJECTION FRACTION APICAL 4 CHAMBER: 63.6
EJECTION FRACTION: 66 %
ERYTHROCYTE [DISTWIDTH] IN BLOOD BY AUTOMATED COUNT: 13.2 % (ref 11.5–14.5)
GLUCOSE SERPL-MCNC: 143 MG/DL (ref 74–99)
HCT VFR BLD AUTO: 39.6 % (ref 41–52)
HGB BLD-MCNC: 13.6 G/DL (ref 13.5–17.5)
LEFT ATRIUM VOLUME AREA LENGTH INDEX BSA: 35.6 ML/M2
LEFT VENTRICLE INTERNAL DIMENSION DIASTOLE: 5.34 CM (ref 3.5–6)
LEFT VENTRICULAR OUTFLOW TRACT DIAMETER: 2 CM
LV EJECTION FRACTION BIPLANE: 66 %
MAGNESIUM SERPL-MCNC: 2 MG/DL (ref 1.6–2.4)
MCH RBC QN AUTO: 31.1 PG (ref 26–34)
MCHC RBC AUTO-ENTMCNC: 34.3 G/DL (ref 32–36)
MCV RBC AUTO: 91 FL (ref 80–100)
MITRAL VALVE E/A RATIO: 2.63
NRBC BLD-RTO: 0 /100 WBCS (ref 0–0)
P AXIS: 66 DEGREES
P AXIS: 71 DEGREES
P OFFSET: 206 MS
P OFFSET: 207 MS
P ONSET: 150 MS
P ONSET: 151 MS
PLATELET # BLD AUTO: 265 X10*3/UL (ref 150–450)
POTASSIUM SERPL-SCNC: 4.1 MMOL/L (ref 3.5–5.3)
PR INTERVAL: 136 MS
PR INTERVAL: 138 MS
Q ONSET: 218 MS
Q ONSET: 220 MS
QRS COUNT: 10 BEATS
QRS COUNT: 13 BEATS
QRS DURATION: 100 MS
QRS DURATION: 98 MS
QT INTERVAL: 414 MS
QT INTERVAL: 458 MS
QTC CALCULATION(BAZETT): 462 MS
QTC CALCULATION(BAZETT): 464 MS
QTC FREDERICIA: 445 MS
QTC FREDERICIA: 463 MS
R AXIS: -27 DEGREES
R AXIS: -29 DEGREES
RBC # BLD AUTO: 4.37 X10*6/UL (ref 4.5–5.9)
RIGHT VENTRICLE FREE WALL PEAK S': 10.6 CM/S
RIGHT VENTRICLE PEAK SYSTOLIC PRESSURE: 13.6 MMHG
SODIUM SERPL-SCNC: 138 MMOL/L (ref 136–145)
T AXIS: 123 DEGREES
T AXIS: 141 DEGREES
T OFFSET: 425 MS
T OFFSET: 449 MS
VENTRICULAR RATE: 62 BPM
VENTRICULAR RATE: 75 BPM
WBC # BLD AUTO: 18.1 X10*3/UL (ref 4.4–11.3)

## 2024-12-09 PROCEDURE — 99232 SBSQ HOSP IP/OBS MODERATE 35: CPT | Performed by: INTERNAL MEDICINE

## 2024-12-09 PROCEDURE — 93005 ELECTROCARDIOGRAM TRACING: CPT | Mod: 59

## 2024-12-09 PROCEDURE — 2500000004 HC RX 250 GENERAL PHARMACY W/ HCPCS (ALT 636 FOR OP/ED): Performed by: INTERNAL MEDICINE

## 2024-12-09 PROCEDURE — 2550000001 HC RX 255 CONTRASTS: Performed by: INTERNAL MEDICINE

## 2024-12-09 PROCEDURE — 7100000001 HC RECOVERY ROOM TIME - INITIAL BASE CHARGE: Performed by: INTERNAL MEDICINE

## 2024-12-09 PROCEDURE — 2720000007 HC OR 272 NO HCPCS: Performed by: INTERNAL MEDICINE

## 2024-12-09 PROCEDURE — 99233 SBSQ HOSP IP/OBS HIGH 50: CPT | Performed by: NURSE PRACTITIONER

## 2024-12-09 PROCEDURE — B2151ZZ FLUOROSCOPY OF LEFT HEART USING LOW OSMOLAR CONTRAST: ICD-10-PCS | Performed by: INTERNAL MEDICINE

## 2024-12-09 PROCEDURE — 2500000002 HC RX 250 W HCPCS SELF ADMINISTERED DRUGS (ALT 637 FOR MEDICARE OP, ALT 636 FOR OP/ED): Performed by: INTERNAL MEDICINE

## 2024-12-09 PROCEDURE — 99152 MOD SED SAME PHYS/QHP 5/>YRS: CPT | Performed by: INTERNAL MEDICINE

## 2024-12-09 PROCEDURE — 83735 ASSAY OF MAGNESIUM: CPT | Performed by: INTERNAL MEDICINE

## 2024-12-09 PROCEDURE — 4A023N7 MEASUREMENT OF CARDIAC SAMPLING AND PRESSURE, LEFT HEART, PERCUTANEOUS APPROACH: ICD-10-PCS | Performed by: INTERNAL MEDICINE

## 2024-12-09 PROCEDURE — 36415 COLL VENOUS BLD VENIPUNCTURE: CPT | Performed by: INTERNAL MEDICINE

## 2024-12-09 PROCEDURE — S4991 NICOTINE PATCH NONLEGEND: HCPCS | Performed by: HOSPITALIST

## 2024-12-09 PROCEDURE — 93010 ELECTROCARDIOGRAM REPORT: CPT | Performed by: INTERNAL MEDICINE

## 2024-12-09 PROCEDURE — 93458 L HRT ARTERY/VENTRICLE ANGIO: CPT | Performed by: INTERNAL MEDICINE

## 2024-12-09 PROCEDURE — 99024 POSTOP FOLLOW-UP VISIT: CPT | Performed by: NURSE PRACTITIONER

## 2024-12-09 PROCEDURE — 2500000001 HC RX 250 WO HCPCS SELF ADMINISTERED DRUGS (ALT 637 FOR MEDICARE OP): Performed by: HOSPITALIST

## 2024-12-09 PROCEDURE — 7100000009 HC PHASE TWO TIME - INITIAL BASE CHARGE: Performed by: INTERNAL MEDICINE

## 2024-12-09 PROCEDURE — 2500000001 HC RX 250 WO HCPCS SELF ADMINISTERED DRUGS (ALT 637 FOR MEDICARE OP): Performed by: NURSE PRACTITIONER

## 2024-12-09 PROCEDURE — 80048 BASIC METABOLIC PNL TOTAL CA: CPT | Performed by: INTERNAL MEDICINE

## 2024-12-09 PROCEDURE — 2500000004 HC RX 250 GENERAL PHARMACY W/ HCPCS (ALT 636 FOR OP/ED): Performed by: HOSPITALIST

## 2024-12-09 PROCEDURE — B2111ZZ FLUOROSCOPY OF MULTIPLE CORONARY ARTERIES USING LOW OSMOLAR CONTRAST: ICD-10-PCS | Performed by: INTERNAL MEDICINE

## 2024-12-09 PROCEDURE — 2500000002 HC RX 250 W HCPCS SELF ADMINISTERED DRUGS (ALT 637 FOR MEDICARE OP, ALT 636 FOR OP/ED): Performed by: HOSPITALIST

## 2024-12-09 PROCEDURE — G0378 HOSPITAL OBSERVATION PER HR: HCPCS

## 2024-12-09 PROCEDURE — C1894 INTRO/SHEATH, NON-LASER: HCPCS | Performed by: INTERNAL MEDICINE

## 2024-12-09 PROCEDURE — C1887 CATHETER, GUIDING: HCPCS | Performed by: INTERNAL MEDICINE

## 2024-12-09 PROCEDURE — 7100000002 HC RECOVERY ROOM TIME - EACH INCREMENTAL 1 MINUTE: Performed by: INTERNAL MEDICINE

## 2024-12-09 PROCEDURE — 93306 TTE W/DOPPLER COMPLETE: CPT | Performed by: INTERNAL MEDICINE

## 2024-12-09 PROCEDURE — 85027 COMPLETE CBC AUTOMATED: CPT | Performed by: INTERNAL MEDICINE

## 2024-12-09 PROCEDURE — C8929 TTE W OR WO FOL WCON,DOPPLER: HCPCS

## 2024-12-09 PROCEDURE — 99239 HOSP IP/OBS DSCHRG MGMT >30: CPT | Performed by: INTERNAL MEDICINE

## 2024-12-09 PROCEDURE — 7100000010 HC PHASE TWO TIME - EACH INCREMENTAL 1 MINUTE: Performed by: INTERNAL MEDICINE

## 2024-12-09 RX ORDER — ALBUTEROL SULFATE 90 UG/1
2 INHALANT RESPIRATORY (INHALATION) EVERY 4 HOURS PRN
Qty: 8 G | Refills: 5 | Status: SHIPPED | OUTPATIENT
Start: 2024-12-09

## 2024-12-09 RX ORDER — ISOSORBIDE MONONITRATE 60 MG/1
60 TABLET, EXTENDED RELEASE ORAL DAILY
Status: DISCONTINUED | OUTPATIENT
Start: 2024-12-09 | End: 2024-12-10 | Stop reason: HOSPADM

## 2024-12-09 RX ORDER — HEPARIN SODIUM 1000 [USP'U]/ML
INJECTION, SOLUTION INTRAVENOUS; SUBCUTANEOUS AS NEEDED
Status: DISCONTINUED | OUTPATIENT
Start: 2024-12-09 | End: 2024-12-09 | Stop reason: HOSPADM

## 2024-12-09 RX ORDER — VALSARTAN 320 MG/1
320 TABLET ORAL DAILY
Qty: 30 TABLET | Refills: 0 | Status: SHIPPED | OUTPATIENT
Start: 2024-12-10 | End: 2025-01-09

## 2024-12-09 RX ORDER — HYDRALAZINE HYDROCHLORIDE 20 MG/ML
10 INJECTION INTRAMUSCULAR; INTRAVENOUS ONCE
Status: COMPLETED | OUTPATIENT
Start: 2024-12-09 | End: 2024-12-09

## 2024-12-09 RX ORDER — PREDNISONE 20 MG/1
40 TABLET ORAL DAILY
Qty: 6 TABLET | Refills: 0 | Status: SHIPPED | OUTPATIENT
Start: 2024-12-09 | End: 2024-12-12

## 2024-12-09 RX ORDER — ENOXAPARIN SODIUM 100 MG/ML
40 INJECTION SUBCUTANEOUS EVERY 24 HOURS
Status: DISCONTINUED | OUTPATIENT
Start: 2024-12-10 | End: 2024-12-10 | Stop reason: HOSPADM

## 2024-12-09 RX ORDER — FLUTICASONE PROPIONATE AND SALMETEROL 100; 50 UG/1; UG/1
1 POWDER RESPIRATORY (INHALATION)
Qty: 60 EACH | Refills: 0 | Status: SHIPPED | OUTPATIENT
Start: 2024-12-09

## 2024-12-09 RX ORDER — NITROGLYCERIN 5 MG/ML
INJECTION, SOLUTION INTRAVENOUS AS NEEDED
Status: DISCONTINUED | OUTPATIENT
Start: 2024-12-09 | End: 2024-12-09 | Stop reason: HOSPADM

## 2024-12-09 RX ORDER — LIDOCAINE HYDROCHLORIDE 20 MG/ML
INJECTION, SOLUTION INFILTRATION; PERINEURAL AS NEEDED
Status: DISCONTINUED | OUTPATIENT
Start: 2024-12-09 | End: 2024-12-09 | Stop reason: HOSPADM

## 2024-12-09 RX ORDER — NAPROXEN SODIUM 220 MG/1
81 TABLET, FILM COATED ORAL DAILY
Qty: 30 TABLET | Refills: 0 | Status: SHIPPED | OUTPATIENT
Start: 2024-12-10 | End: 2025-01-09

## 2024-12-09 RX ORDER — AMLODIPINE BESYLATE 10 MG/1
10 TABLET ORAL DAILY
Qty: 30 TABLET | Refills: 0 | Status: SHIPPED | OUTPATIENT
Start: 2024-12-10 | End: 2025-01-09

## 2024-12-09 RX ORDER — ATORVASTATIN CALCIUM 40 MG/1
40 TABLET, FILM COATED ORAL NIGHTLY
Qty: 30 TABLET | Refills: 0 | Status: SHIPPED | OUTPATIENT
Start: 2024-12-09 | End: 2025-01-08

## 2024-12-09 RX ORDER — IPRATROPIUM BROMIDE AND ALBUTEROL SULFATE 2.5; .5 MG/3ML; MG/3ML
3 SOLUTION RESPIRATORY (INHALATION) EVERY 6 HOURS PRN
Qty: 180 ML | Refills: 0 | Status: SHIPPED | OUTPATIENT
Start: 2024-12-09

## 2024-12-09 RX ORDER — IBUPROFEN 200 MG
1 TABLET ORAL DAILY
Qty: 30 PATCH | Refills: 0 | Status: SHIPPED | OUTPATIENT
Start: 2024-12-10 | End: 2025-01-09

## 2024-12-09 RX ORDER — HYDROCHLOROTHIAZIDE 25 MG/1
50 TABLET ORAL DAILY
Status: DISCONTINUED | OUTPATIENT
Start: 2024-12-09 | End: 2024-12-10 | Stop reason: HOSPADM

## 2024-12-09 RX ORDER — MIDAZOLAM HYDROCHLORIDE 1 MG/ML
INJECTION, SOLUTION INTRAMUSCULAR; INTRAVENOUS AS NEEDED
Status: DISCONTINUED | OUTPATIENT
Start: 2024-12-09 | End: 2024-12-09 | Stop reason: HOSPADM

## 2024-12-09 RX ORDER — HYDROCHLOROTHIAZIDE 50 MG/1
50 TABLET ORAL DAILY
Qty: 30 TABLET | Refills: 0 | Status: SHIPPED | OUTPATIENT
Start: 2024-12-10 | End: 2025-01-09

## 2024-12-09 RX ORDER — FENTANYL CITRATE 50 UG/ML
INJECTION, SOLUTION INTRAMUSCULAR; INTRAVENOUS AS NEEDED
Status: DISCONTINUED | OUTPATIENT
Start: 2024-12-09 | End: 2024-12-09 | Stop reason: HOSPADM

## 2024-12-09 RX ORDER — AMOXICILLIN AND CLAVULANATE POTASSIUM 875; 125 MG/1; MG/1
1 TABLET, FILM COATED ORAL 2 TIMES DAILY
Qty: 4 TABLET | Refills: 0 | Status: SHIPPED | OUTPATIENT
Start: 2024-12-09 | End: 2024-12-11

## 2024-12-09 RX ORDER — ISOSORBIDE MONONITRATE 60 MG/1
60 TABLET, EXTENDED RELEASE ORAL DAILY
Qty: 30 TABLET | Refills: 0 | Status: SHIPPED | OUTPATIENT
Start: 2024-12-10 | End: 2025-01-09

## 2024-12-09 RX ADMIN — NICOTINE 1 PATCH: 21 PATCH, EXTENDED RELEASE TRANSDERMAL at 08:22

## 2024-12-09 RX ADMIN — ISOSORBIDE MONONITRATE 60 MG: 60 TABLET, EXTENDED RELEASE ORAL at 08:21

## 2024-12-09 RX ADMIN — HYDROCHLOROTHIAZIDE 50 MG: 25 TABLET ORAL at 08:21

## 2024-12-09 RX ADMIN — ASPIRIN 81 MG: 81 TABLET, CHEWABLE ORAL at 08:21

## 2024-12-09 RX ADMIN — AMLODIPINE BESYLATE 10 MG: 5 TABLET ORAL at 08:21

## 2024-12-09 RX ADMIN — VALSARTAN 320 MG: 160 TABLET, FILM COATED ORAL at 08:21

## 2024-12-09 RX ADMIN — CEFTRIAXONE SODIUM 1 G: 1 INJECTION, SOLUTION INTRAVENOUS at 12:03

## 2024-12-09 RX ADMIN — AZITHROMYCIN 500 MG: 250 TABLET, FILM COATED ORAL at 08:21

## 2024-12-09 RX ADMIN — POLYETHYLENE GLYCOL 3350 17 G: 17 POWDER, FOR SOLUTION ORAL at 08:33

## 2024-12-09 RX ADMIN — PERFLUTREN 8.7 ML OF DILUTION: 6.52 INJECTION, SUSPENSION INTRAVENOUS at 13:54

## 2024-12-09 RX ADMIN — HYDRALAZINE HYDROCHLORIDE 10 MG: 20 INJECTION INTRAMUSCULAR; INTRAVENOUS at 11:06

## 2024-12-09 RX ADMIN — FLUTICASONE FUROATE AND VILANTEROL TRIFENATATE 1 PUFF: 100; 25 POWDER RESPIRATORY (INHALATION) at 08:21

## 2024-12-09 SDOH — ECONOMIC STABILITY: INCOME INSECURITY: IN THE LAST 12 MONTHS, WAS THERE A TIME WHEN YOU WERE NOT ABLE TO PAY THE MORTGAGE OR RENT ON TIME?: YES

## 2024-12-09 SDOH — ECONOMIC STABILITY: FOOD INSECURITY: WITHIN THE PAST 12 MONTHS, THE FOOD YOU BOUGHT JUST DIDN'T LAST AND YOU DIDN'T HAVE MONEY TO GET MORE.: NEVER TRUE

## 2024-12-09 SDOH — ECONOMIC STABILITY: TRANSPORTATION INSECURITY
IN THE PAST 12 MONTHS, HAS THE LACK OF TRANSPORTATION KEPT YOU FROM MEDICAL APPOINTMENTS OR FROM GETTING MEDICATIONS?: NO

## 2024-12-09 SDOH — ECONOMIC STABILITY: FOOD INSECURITY: WITHIN THE PAST 12 MONTHS, YOU WORRIED THAT YOUR FOOD WOULD RUN OUT BEFORE YOU GOT MONEY TO BUY MORE.: NEVER TRUE

## 2024-12-09 SDOH — ECONOMIC STABILITY: HOUSING INSECURITY

## 2024-12-09 SDOH — ECONOMIC STABILITY: GENERAL

## 2024-12-09 SDOH — ECONOMIC STABILITY: TRANSPORTATION INSECURITY: IN THE PAST 12 MONTHS, HAS LACK OF TRANSPORTATION KEPT YOU FROM MEDICAL APPOINTMENTS OR FROM GETTING MEDICATIONS?: NO

## 2024-12-09 SDOH — ECONOMIC STABILITY: HOUSING INSECURITY: IN THE LAST 12 MONTHS, HOW MANY PLACES HAVE YOU LIVED?: 1

## 2024-12-09 SDOH — ECONOMIC STABILITY: FOOD INSECURITY

## 2024-12-09 SDOH — ECONOMIC STABILITY: HOUSING INSECURITY
IN THE LAST 12 MONTHS, WAS THERE A TIME WHEN YOU DID NOT HAVE A STEADY PLACE TO SLEEP OR SLEPT IN A SHELTER (INCLUDING NOW)?: NO

## 2024-12-09 SDOH — ECONOMIC STABILITY: TRANSPORTATION INSECURITY
IN THE PAST 12 MONTHS, HAS LACK OF TRANSPORTATION KEPT YOU FROM MEETINGS, WORK, OR FROM GETTING THINGS NEEDED FOR DAILY LIVING?: NO

## 2024-12-09 SDOH — ECONOMIC STABILITY: HOUSING INSECURITY: IN THE LAST 12 MONTHS, WAS THERE A TIME WHEN YOU WERE NOT ABLE TO PAY THE MORTGAGE OR RENT ON TIME?: YES

## 2024-12-09 SDOH — ECONOMIC STABILITY: HOUSING INSECURITY: IN THE PAST 12 MONTHS HAS THE ELECTRIC, GAS, OIL, OR WATER COMPANY THREATENED TO SHUT OFF SERVICES IN YOUR HOME?: NO

## 2024-12-09 SDOH — ECONOMIC STABILITY: TRANSPORTATION INSECURITY

## 2024-12-09 SDOH — ECONOMIC STABILITY: FOOD INSECURITY: WITHIN THE PAST 12 MONTHS, YOU WORRIED THAT YOUR FOOD WOULD RUN OUT BEFORE YOU GOT THE MONEY TO BUY MORE.: NEVER TRUE

## 2024-12-09 ASSESSMENT — PAIN - FUNCTIONAL ASSESSMENT
PAIN_FUNCTIONAL_ASSESSMENT: 0-10

## 2024-12-09 ASSESSMENT — PAIN SCALES - GENERAL
PAINLEVEL_OUTOF10: 0 - NO PAIN

## 2024-12-09 ASSESSMENT — ACTIVITIES OF DAILY LIVING (ADL): LACK_OF_TRANSPORTATION: NO

## 2024-12-09 ASSESSMENT — SOCIAL DETERMINANTS OF HEALTH (SDOH): IN THE PAST 12 MONTHS, HAS THE ELECTRIC, GAS, OIL, OR WATER COMPANY THREATENED TO SHUT OFF SERVICE IN YOUR HOME?: NO

## 2024-12-09 NOTE — SIGNIFICANT EVENT
Reinforced d-stat dressing with pressure dressing; it was starting to peel up. Right radial site remains soft and stable with no hematoma or oozing.

## 2024-12-09 NOTE — SIGNIFICANT EVENT
Upon arrival to unit focused assessment performed and WDL. Right radial site soft and stable with no hematoma or oozing; d-stat band in place. Educated Pt on current restrictions r/t right radial arterial puncture, he states understanding and denies needs at this time. No complaints of dizziness/lightheadedness/pain. Given turkey sandwich box, coffee, and water. No family present at this time.

## 2024-12-09 NOTE — PROGRESS NOTES
Cardiology Progress Note  Patient: Gustavo Foreman  Unit/Bed: 810/810-A  YOB: 1970  MRN: [id number]  Acct: [id number]   Admitting Diagnosis: Elevated troponin [R79.89]  Pneumonia of both lower lobes due to infectious organism [J18.9]  Dyspnea, unspecified type [R06.00]  Date:  12/7/2024  Hospital Day: 2  Attending: Efren Storey MD   Rounding Cardiologist/NASIM:  VIJAY Moraes-CNP,     Primary Cardiologist:  None       Complaint:  Chief Complaint   Patient presents with    Shortness of Breath     Pt thinks he has pneumonia cough, congestion sob and coughing up blood         SUBJECTIVE    12/9/24  Alert and oriented x 3.  Still slightly wheezy.  Denies any further chest pain.  Denies any fevers or chills.  Denies palpitations.  BP still slightly high    7/25/2024 feels much better today.  Does not describe angina at this point in time.  Still with considerable wheezing rhonchorous breath is able to mobilize secretions better.  No fever or chills.      7/24 initial cardiology consultation: Presents with right middle lobe pneumonia slight hemoptysis.  Chronic angina over the prior several months as well.  Severely hypertensive on presentation as well.  Felt to have type II MI secondary to hypoxia from his pneumonia.  No ECG changes to suggest plaque rupture.  Felt likely to need cath prior to discharge.      VITALS      Vitals:    12/08/24 2000 12/08/24 2347 12/09/24 0440 12/09/24 0726   BP: 140/64 (!) 157/93 (!) 170/93 (!) 198/98   BP Location:    Right arm   Patient Position:    Lying   Pulse:  65 67 68   Resp:    18   Temp:  36.8 °C (98.2 °F) 36.2 °C (97.2 °F) 36.2 °C (97.2 °F)   TempSrc:    Temporal   SpO2:  95% 95% 93%   Weight:       Height:           Intake/Output Summary (Last 24 hours) at 12/9/2024 0846  Last data filed at 12/9/2024 0805  Gross per 24 hour   Intake 633.03 ml   Output --   Net 633.03 ml      Wt Readings from Last 4 Encounters:   12/07/24 103 kg (226 lb 13.7 oz)         Allergies:  Allergies   Allergen Reactions    Bee Venom Protein (Honey Bee) Anaphylaxis    Lisinopril Unknown     Nausea    Codeine Rash        PHYSICAL EXAM   Physical Exam  Constitutional:       General: He is not in acute distress.  HENT:      Mouth/Throat:      Mouth: Mucous membranes are moist.   Eyes:      Pupils: Pupils are equal, round, and reactive to light.   Cardiovascular:      Rate and Rhythm: Normal rate and regular rhythm.      Heart sounds: No murmur heard.  Pulmonary:      Breath sounds: Wheezing and rhonchi present.   Abdominal:      Palpations: Abdomen is soft.   Musculoskeletal:      Cervical back: Normal range of motion.      Right lower leg: No edema.      Left lower leg: No edema.   Neurological:      Mental Status: He is alert.           LABS   CBC:   Results from last 7 days   Lab Units 12/09/24  0549 12/08/24 0449 12/07/24  1032   WBC AUTO x10*3/uL 18.1* 10.7 9.1   RBC AUTO x10*6/uL 4.37* 4.30* 4.73   HEMOGLOBIN g/dL 13.6 13.3* 14.7   HEMATOCRIT % 39.6* 38.7* 43.0   MCV fL 91 90 91   MCH pg 31.1 30.9 31.1   MCHC g/dL 34.3 34.4 34.2   RDW % 13.2 13.2 12.8   PLATELETS AUTO x10*3/uL 265 232 214     CMP:    Results from last 7 days   Lab Units 12/09/24  0549 12/08/24  0450 12/07/24  1032   SODIUM mmol/L 138 136 137   POTASSIUM mmol/L 4.1 3.5 3.7   CHLORIDE mmol/L 103 103 103   CO2 mmol/L 27 26 26   BUN mg/dL 25* 20 11   CREATININE mg/dL 0.76 0.80 0.83   GLUCOSE mg/dL 143* 291* 120*   PROTEIN TOTAL g/dL  --   --  7.3   CALCIUM mg/dL 8.7 9.0 8.4*   BILIRUBIN TOTAL mg/dL  --   --  0.6   ALK PHOS U/L  --   --  76   AST U/L  --   --  19   ALT U/L  --   --  19     BMP:    Results from last 7 days   Lab Units 12/09/24  0549 12/08/24  0450 12/07/24  1032   SODIUM mmol/L 138 136 137   POTASSIUM mmol/L 4.1 3.5 3.7   CHLORIDE mmol/L 103 103 103   CO2 mmol/L 27 26 26   BUN mg/dL 25* 20 11   CREATININE mg/dL 0.76 0.80 0.83   CALCIUM mg/dL 8.7 9.0 8.4*   GLUCOSE mg/dL 143* 291* 120*      Magnesium:  Results from last 7 days   Lab Units 12/09/24  0549 12/08/24  0450 12/07/24  1032   MAGNESIUM mg/dL 2.00 2.12 1.73     Troponin:    Results from last 7 days   Lab Units 12/08/24  0450 12/07/24  1207 12/07/24  1032   TROPHS ng/L 37* 88* 93*     BNP:   Results from last 7 days   Lab Units 12/07/24  1032   BNP pg/mL 224*     Lipid Panel:  Results from last 7 days   Lab Units 12/07/24  1032   HDL mg/dL 30.5   CHOLESTEROL/HDL RATIO  5.0   VLDL mg/dL 20   TRIGLYCERIDES mg/dL 101   NON HDL CHOL. mg/dL 124        amLODIPine, 10 mg, oral, Daily  aspirin, 81 mg, oral, Daily  atorvastatin, 40 mg, oral, Nightly  cefTRIAXone, 1 g, intravenous, q24h  fluticasone furoate-vilanteroL, 1 puff, inhalation, Daily  hydroCHLOROthiazide, 50 mg, oral, Daily  isosorbide mononitrate ER, 60 mg, oral, Daily  methylPREDNISolone sodium succinate (PF), 40 mg, intravenous, Daily  nicotine, 1 patch, transdermal, Daily  valsartan, 320 mg, oral, Daily             No current outpatient medications     CT angio chest for pulmonary embolism    Result Date: 12/7/2024  Interpreted By:  Valente Walters, STUDY: CT ANGIO CHEST FOR PULMONARY EMBOLISM;  12/7/2024 11:35 am   INDICATION: Signs/Symptoms:SOB elevated dimer.     COMPARISON: Two view chest radiograph, same day, 7 December 2024; CT chest with contrast 4 September 2023; other chest CTs back to the oldest available, with contrast 5 July 2008   ACCESSION NUMBER(S): [id number]   ORDERING CLINICIAN: KAYLYNN XAVIER   TECHNIQUE: Pulmonary arterial phase CT chest after the uneventful administration of 75 mL IV contrast (Omnipaque 350).   Three dimensional maximum intensity projection (3-D MIPs) image/s were created on a separate dedicated workstation, reviewed and saved   FINDINGS: CARDIOVASCULAR:   Acute pulmonary embolism: Negative through the  lobar (second order) branch level. Substantial sized branches from segmental (third order) branch and distally cannot be evaluated Acute right  heart strain: Negative. No CT evidence of acute right heart strain Cardiac thrombus:  Negative; no obvious right heart or other cardiac thrombus is seen Pulmonary arteries ectasia:  Negative   Heart size:  Within normal limits Pericardial effusion:  Negative   Thoracic aortic aneurysm:  Negative Aortic dissection:  Negative   Heart failure change:  Negative.  No sign of interstitial or alveolar edema. Other:  n/a   NONVASCULAR MEDIASTINUM: Esophagus:  Grossly normal by CT Mediastinal Mass:  Negative Hiatal hernia:  None Other:  n/a   LYMPH NODES: No thoracic adenopathy   LUNGS / AIRSPACES / AIRWAYS:   LARGE AIRWAYS Filling defect: Negative Wall thickening: Negative Bronchiectasis: Negative Other: N/A   AIRSPACES Fibrosis: Negative Emphysema: Negative Consolidation: In the middle lobe from the hilus through some of the medial segment Ground glass airspace disease: Patchy clustered nodules with surrounding airspace ground-glass involving all lobes and at the periphery of the middle lobe consolidation Edema: Negative Nodule / Mass: Negative Other: N/A   PLEURA: Effusion:  Both sides negative Pneumothorax:  Both sides negative Other:  n/a   CHEST WALL: Soft tissues of the chest wall are unremarkable   SKELETON: No acute or contributory abnormality   UPPER ABDOMEN: 3 cm enhancing right lobe liver lesion series 401, image 315 for example morphologically is reminiscent of a hemangioma, at a level potentially not included in the field of view on any of the prior chest CTs. There is also a small, simple hepatic cyst on same series image 175, unchanged from prior CT       CARITO CONSOLIDATION EXTENDING FROM THE HILUS THROUGH THE MEDIAL SEGMENT MIDDLE LOBE SUSPECTED PNEUMONIA   OTHER LESS CONSOLIDATIVE AIRSPACE DISEASE IN ALL OF THE OTHER LOBES AND AT THE PERIPHERY OF THE MIDDLE LOBE CONSOLIDATION. THESE FINDINGS ARE CLUSTERS OF VARIABLY SIZED SOLID AND GROUND-GLASS COMPOSITION NODULES WITH INTERVENING HAZY GROUND-GLASS  AIRSPACE DISEASE THROUGHOUT THE REGIONS OF THE CLUSTER OF NODULES, SUSPECT FOR PNEUMONIA IF A DIFFERENT VARIETY OR PERHAPS ONLY EARLY CONSOLIDATIVE   NO OTHER ACUTE DISEASE IN THE CHEST   NO ACUTE PULMONARY EMBOLISM THROUGH THE LOBAR BRANCH LEVEL   NO AORTIC DISSECTION OR OTHER ACUTE THORACIC AORTIC FINDINGS   NO PLEURAL OR PERICARDIAL EFFUSION   NO PNEUMOTHORAX   MACRO: None   Signed by: Valente Walters 12/7/2024 11:54 AM Dictation workstation:   [id number]    ECG 12 lead    Result Date: 12/7/2024  Normal sinus rhythm Left anterior fascicular block Left ventricular hypertrophy with repolarization abnormality Cannot rule out Septal infarct (cited on or before 07-DEC-2024) Abnormal ECG When compared with ECG of 07-DEC-2024 10:08, (unconfirmed) Serial changes of Septal infarct Present    ECG 12 lead    Result Date: 12/7/2024  Normal sinus rhythm Left anterior fascicular block Septal infarct (cited on or before 07-DEC-2024) T wave abnormality, consider lateral ischemia Abnormal ECG When compared with ECG of 04-SEP-2023 22:12, Previous ECG has undetermined rhythm, needs review Left anterior fascicular block is now Present Serial changes of Septal infarct Present    XR chest 2 views    Result Date: 12/7/2024  Interpreted By:  Lizy Gamez, STUDY: XR CHEST 2 VIEWS;  12/7/2024 10:20 am   INDICATION: Signs/Symptoms:Chest Pain.   COMPARISON: 01/13/2019   ACCESSION NUMBER(S): [id number]   ORDERING CLINICIAN: KAYLYNN XAVIER   FINDINGS: CARDIOMEDIASTINAL SILHOUETTE: Cardiomediastinal silhouette is normal in size and configuration.     LUNGS: There are bilateral perihilar infiltrates. There is no pleural fluid.   ABDOMEN: No remarkable upper abdominal findings.     BONES: No acute osseous changes.       Bilateral perihilar infiltrates.   MACRO: None   Signed by: Lizy Gamez 12/7/2024 10:31 AM Dictation workstation:   [id number]       No results found for this or any previous visit from the past 1095 days.                  Encounter Date: 12/07/24   ECG 12 lead   Result Value    Ventricular Rate 62    Atrial Rate 62    OH Interval 138    QRS Duration 98    QT Interval 458    QTC Calculation(Bazett) 464    P Axis 66    R Axis -29    T Axis 141    QRS Count 10    Q Onset 220    P Onset 151    P Offset 207    T Offset 449    QTC Fredericia 463    Narrative    Normal sinus rhythm  Minimal voltage criteria for LVH, may be normal variant  Anteroseptal infarct (cited on or before 07-DEC-2024)  T wave abnormality, consider lateral ischemia  Abnormal ECG  When compared with ECG of 08-DEC-2024 19:10, (unconfirmed)  No significant change was found        Tele Monitoring: Normal sinus rhythm    Assessment     Patient Active Problem List   Diagnosis    Dyspnea, unspecified type   NSTEMI  Hypertension  Hyperlipidemia  Pneumonia  Obesity    Plan  Remains on stepdown telemetry unit.  Remains in normal sinus rhythm with nonspecific ST wave abnormalities.  No STEMI criteria  Supplemental O2  Continue antibiotics for pneumonia  Moderate suspicion for ACS with elevated troponin along with angina with exertion.  Discussed specific interventions.  Patient agreed to proceed with left heart catheterization.  Keep patient NPO.  Will proceed with left heart cath today with Dr. Arvizu.  BP still on the high side, continue Diovan and hydrochlorothiazide along with amlodipine  Avoid beta-blocker, may resort to cardioselective beta-blocker if needed  Echocardiogram today  Further recommendations post [facility] and echocardiogram    Guille Toro [facility]  Adult Gerontology Acute Care Nurse Practitioner  [facility]   [facility]  [phone]         Electronically signed by JUAN CARLOS Moraes on 12/9/2024 at 8:46 AM

## 2024-12-09 NOTE — PROGRESS NOTES
ASSESSMENT & PLAN:     54 year old male admitted with dyspnea secondary to acute COPD exacerbation, community acquired pneumonia and nstemi     CAP  COPDE  -continue ctx, finished azithro  -wean IV steroid to daily, also contributing to HTN  -cont breo-ellipta, PRN nebs  -currently on RA  -needs to quit smoking  -will try to get new nebulizer machine for home     NSTEMI  -prior to feeling sob has had chest pain on and off for the past few weeks  -trop peaked 88, down trended to 37. Currently CP free  -cardiology consulted, plan for [facility] today  -ASA 81, high intensity statin  -Imdur  -avoiding BB for now due to acute COPDE  -TTE pending  -tele     Hypertensive urgency  -cardiology adjusting meds, was on none PTA  -currently on amlodipine 10, hydrochlorothiazide 50, valsartan 320, imdur 60     Nicotine abuse: smokes 2-3 packs a day, trying to cut back  -nicotine patch here     DVT ppx: lovenox  Dispo is home when ready, likely tomorrow    Efren Storey MD    SUBJECTIVE     NAEON. Breathing continues to feel better little by little. No CP.       OBJECTIVE:       Last Recorded Vitals:  Vitals:    12/09/24 0440 12/09/24 0726 12/09/24 0951 12/09/24 1010   BP: (!) 170/93 (!) 198/98 (!) 187/98 (!) 173/98   BP Location:  Right arm  Right arm   Patient Position:  Lying     Pulse: 67 68  71   Resp:  18     Temp: 36.2 °C (97.2 °F) 36.2 °C (97.2 °F)     TempSrc:  Temporal     SpO2: 95% 93%     Weight:       Height:           Last I/O:  I/O last 3 completed shifts:  In: 232.3 (2.3 mL/kg) [I.V.:232.3 (2.3 mL/kg)]  Out: - (0 mL/kg)   Weight: 102.9 kg     Physical Exam:  GEN: appearing, appears stated age, NAD  CV: RRR, no m/r/g, no LE edema  LUNGS: diffuse b/l coarse exp wheezing, no acute resp distress, on room air  ABD: soft, NT, ND, NBS  SKIN: no rashes  MSK; no gross deformities, normal joints  NEURO: A+Ox3, no FND  PSYCH: appropriate mood, affec    Inpatient Medications:  amLODIPine, 10 mg, oral, Daily  aspirin, 81 mg, oral,  Daily  atorvastatin, 40 mg, oral, Nightly  cefTRIAXone, 1 g, intravenous, q24h  fluticasone furoate-vilanteroL, 1 puff, inhalation, Daily  hydrALAZINE, 10 mg, intravenous, Once  hydroCHLOROthiazide, 50 mg, oral, Daily  isosorbide mononitrate ER, 60 mg, oral, Daily  methylPREDNISolone sodium succinate (PF), 40 mg, intravenous, Daily  nicotine, 1 patch, transdermal, Daily  valsartan, 320 mg, oral, Daily        PRN Medications  PRN medications: acetaminophen **OR** acetaminophen **OR** acetaminophen, acetaminophen **OR** acetaminophen **OR** acetaminophen, heparin, ipratropium-albuteroL, ondansetron **OR** ondansetron, polyethylene glycol    Continuous Medications:           LABS AND IMAGING:     Labs:  Results for orders placed or performed during the hospital encounter of 12/07/24 (from the past 24 hours)   ECG 12 Lead   Result Value Ref Range    Ventricular Rate 75 BPM    Atrial Rate 75 BPM    TX Interval 136 ms    QRS Duration 100 ms    QT Interval 414 ms    QTC Calculation(Bazett) 462 ms    P Axis 71 degrees    R Axis -27 degrees    T Axis 123 degrees    QRS Count 13 beats    Q Onset 218 ms    P Onset 150 ms    P Offset 206 ms    T Offset 425 ms    QTC Fredericia 445 ms   Basic Metabolic Panel   Result Value Ref Range    Glucose 143 (H) 74 - 99 mg/dL    Sodium 138 136 - 145 mmol/L    Potassium 4.1 3.5 - 5.3 mmol/L    Chloride 103 98 - 107 mmol/L    Bicarbonate 27 21 - 32 mmol/L    Anion Gap 12 10 - 20 mmol/L    Urea Nitrogen 25 (H) 6 - 23 mg/dL    Creatinine 0.76 0.50 - 1.30 mg/dL    eGFR >90 >60 mL/min/1.73m*2    Calcium 8.7 8.6 - 10.3 mg/dL   CBC   Result Value Ref Range    WBC 18.1 (H) 4.4 - 11.3 x10*3/uL    nRBC 0.0 0.0 - 0.0 /100 WBCs    RBC 4.37 (L) 4.50 - 5.90 x10*6/uL    Hemoglobin 13.6 13.5 - 17.5 g/dL    Hematocrit 39.6 (L) 41.0 - 52.0 %    MCV 91 80 - 100 fL    MCH 31.1 26.0 - 34.0 pg    MCHC 34.3 32.0 - 36.0 g/dL    RDW 13.2 11.5 - 14.5 %    Platelets 265 150 - 450 x10*3/uL   Magnesium   Result Value  Ref Range    Magnesium 2.00 1.60 - 2.40 mg/dL   ECG 12 lead   Result Value Ref Range    Ventricular Rate 62 BPM    Atrial Rate 62 BPM    WA Interval 138 ms    QRS Duration 98 ms    QT Interval 458 ms    QTC Calculation(Bazett) 464 ms    P Axis 66 degrees    R Axis -29 degrees    T Axis 141 degrees    QRS Count 10 beats    Q Onset 220 ms    P Onset 151 ms    P Offset 207 ms    T Offset 449 ms    QTC Fredericia 463 ms        Imaging:  ECG 12 lead  Normal sinus rhythm  Minimal voltage criteria for LVH, may be normal variant  Anteroseptal infarct (cited on or before 07-DEC-2024)  T wave abnormality, consider lateral ischemia  Abnormal ECG  When compared with ECG of 08-DEC-2024 19:10, (unconfirmed)  No significant change was found  Confirmed by Holli Gross ([id number]) on 12/9/2024 8:49:42 AM  ECG 12 Lead  Normal sinus rhythm  Possible Anterior infarct , age undetermined  ST & T wave abnormality, consider lateral ischemia  Abnormal ECG    Confirmed by Holli Gross ([id number]) on 12/9/2024 8:48:36 AM

## 2024-12-09 NOTE — SIGNIFICANT EVENT
"D-stat removed from right radial site, quick clot/d-stat dressing applied. Right radial soft and stable with no hematoma or oozing. While removing band yellow folder given to Pt containing Wound Care for Arterial Puncture Discharge Instructions/Education. Explained this to Pt utilizing the \"teach back\" method, he states understanding and denies needs at this  time. Pictures from procedure were also placed into yellow folder. Hospitalist rounded and notified Pt that he would be discharged this evening.  "

## 2024-12-09 NOTE — POST-PROCEDURE NOTE
Physician Transition of Care Summary  Invasive Cardiovascular Lab    Procedure Date: 12/9/2024  Attending:    * Marce Arvizu - Primary  Resident/Fellow/Other Assistant: Surgeons and Role:  * No surgeons found with a matching role *    Indications:   Pre-op Diagnosis      * Dyspnea, unspecified type [R06.00]     * Elevated troponin [R79.89]     * NSTEMI (non-ST elevated myocardial infarction) (Multi) [I21.4]    Post-procedure diagnosis:   Post-op Diagnosis     * Dyspnea, unspecified type [R06.00]     * Elevated troponin [R79.89]     * NSTEMI (non-ST elevated myocardial infarction) (Multi) [I21.4]    Procedure(s):   Left Heart Cath, With LV  [id number] - [facility] CATH PLMT L HRT & ARTS W/NJX & ANGIO IMG S&I        Procedure Findings:   Normal coronaries, normal left ventricular function    Description of the Procedure:   Left heart catheterization coronary angiography left ventricular    Complications:   None    Stents/Implants:   Implants       No implant documentation for this case.            Anticoagulation/Antiplatelet Plan:   Intravenous heparin    Estimated Blood Loss:   0 mL    Anesthesia: Moderate Sedation Anesthesia Staff: No anesthesia staff entered.    Any Specimen(s) Removed:   No specimens collected during this procedure.    Disposition:   Medical therapy      Electronically signed by: Marce Arvizu MD, 12/9/2024 2:44 PM

## 2024-12-09 NOTE — PROGRESS NOTES
Care transitions :  Attempted to see patient for discharge planning assessment, currently off of floor for left heart cath.     Spoke with attending regarding pt need for replacement nebulizer , prescription has been completed and faxed successfully to medical services.    Will re-attempt to see patient at later time.  Additional DC needs TBD.

## 2024-12-09 NOTE — PROGRESS NOTES
Patient is stable status post C under the care of Dr. Arvizu.  Discussed results of procedure with patient.  Pictures provided.  Findings of the LHC revealed normal coronary arteries and a normal LVEF.  Please see procedural report for complete details.  Medical management is advised.  Continue to optimize blood pressure medications.  Continue to monitor on telemetry.  Further recommendations per general cardiology.  Postprocedural activity, restrictions, potential complications, medications and future follow-up discussed at length.  All questions answered.  Patient verbalized understanding.

## 2024-12-09 NOTE — SIGNIFICANT EVENT
Right radial site remains soft and stable with no hematoma or oozing. Transporting back to [address] via cart. Charting/care continued on unit.

## 2024-12-10 NOTE — DISCHARGE SUMMARY
DISCHARGE DIAGNOSIS     CAP  COPDE  NSTEMI  Hypertensive urgency  Nicotine abuse    HOSPITAL COURSE AND DETAILS     54 year old male admitted with dyspnea secondary to acute COPD exacerbation, community acquired pneumonia and nstemi     CAP  COPDE  -finished azithro here, finish Augmentin for total 5d course  -treated with IV steroid here, finish with prednisone for total 5d course  -cont Advair, PRN albuterol, PRN nebs, sent Rx for nebulizer machine  -remained on room air throughout time here  -needs to quit smoking     NSTEMI  -prior to feeling sob has had chest pain on and off for the past few weeks  -trop peaked 88, down trended to 37. Currently CP free  -cardiology consulted, LHC done that showed normal coronaries. Normal TTE.   -was likely type 2 NSTEMI 2/2 above problems  -cont ASA 81, high intensity statin, Imdur  -avoiding BB for now due to acute COPDE     Hypertensive urgency  -cardiology adjusting meds, was on none PTA  -started on amlodipine 10, hydrochlorothiazide 50, valsartan 320, imdur 60     Nicotine abuse: smokes 2-3 packs a day, trying to cut back  -nicotine patch sent    Stable for discharge to home. Total time spent on discharge services 31 minutes.    DISCHARGE PHYSICAL EXAM     Last Recorded Vitals:  Vitals:    12/09/24 1108 12/09/24 1112 12/09/24 1203 12/09/24 1420   BP: 155/84 152/84 156/86 (!) 179/106   BP Location: Right arm Right arm     Patient Position: Lying      Pulse: 65 70 71 74   Resp: 18   12   Temp: 36.4 °C (97.5 °F)      TempSrc: Temporal      SpO2: 94%   99%   Weight:       Height:           Physical Exam:  GEN: appears stated age, NAD  CV: RRR, no m/r/g, no LE edema  LUNGS: scattered exp wheezing  ABD: soft, NT  SKIN: no rashes  MSK; no gross deformities, normal joints  NEURO: A+Ox3, no FND  PSYCH: appropriate mood, affect      PERTINENT LABS AND IMAGING     Results for orders placed or performed during the hospital encounter of 12/07/24 (from the past 96 hours)   POCT  GLUCOSE   Result Value Ref Range    POCT Glucose 130 (H) 74 - 99 mg/dL   CBC and Auto Differential   Result Value Ref Range    WBC 9.1 4.4 - 11.3 x10*3/uL    nRBC 0.0 0.0 - 0.0 /100 WBCs    RBC 4.73 4.50 - 5.90 x10*6/uL    Hemoglobin 14.7 13.5 - 17.5 g/dL    Hematocrit 43.0 41.0 - 52.0 %    MCV 91 80 - 100 fL    MCH 31.1 26.0 - 34.0 pg    MCHC 34.2 32.0 - 36.0 g/dL    RDW 12.8 11.5 - 14.5 %    Platelets 214 150 - 450 x10*3/uL    Neutrophils % 77.0 40.0 - 80.0 %    Immature Granulocytes %, Automated 0.4 0.0 - 0.9 %    Lymphocytes % 13.2 13.0 - 44.0 %    Monocytes % 8.5 2.0 - 10.0 %    Eosinophils % 0.7 0.0 - 6.0 %    Basophils % 0.2 0.0 - 2.0 %    Neutrophils Absolute 6.97 1.20 - 7.70 x10*3/uL    Immature Granulocytes Absolute, Automated 0.04 0.00 - 0.70 x10*3/uL    Lymphocytes Absolute 1.20 1.20 - 4.80 x10*3/uL    Monocytes Absolute 0.77 0.10 - 1.00 x10*3/uL    Eosinophils Absolute 0.06 0.00 - 0.70 x10*3/uL    Basophils Absolute 0.02 0.00 - 0.10 x10*3/uL   Comprehensive Metabolic Panel   Result Value Ref Range    Glucose 120 (H) 74 - 99 mg/dL    Sodium 137 136 - 145 mmol/L    Potassium 3.7 3.5 - 5.3 mmol/L    Chloride 103 98 - 107 mmol/L    Bicarbonate 26 21 - 32 mmol/L    Anion Gap 12 10 - 20 mmol/L    Urea Nitrogen 11 6 - 23 mg/dL    Creatinine 0.83 0.50 - 1.30 mg/dL    eGFR >90 >60 mL/min/1.73m*2    Calcium 8.4 (L) 8.6 - 10.3 mg/dL    Albumin 4.0 3.4 - 5.0 g/dL    Alkaline Phosphatase 76 33 - 120 U/L    Total Protein 7.3 6.4 - 8.2 g/dL    AST 19 9 - 39 U/L    Bilirubin, Total 0.6 0.0 - 1.2 mg/dL    ALT 19 10 - 52 U/L   Magnesium   Result Value Ref Range    Magnesium 1.73 1.60 - 2.40 mg/dL   D-Dimer, Quantitative VTE Exclusion   Result Value Ref Range    D-Dimer, Quantitative VTE Exclusion 1,129 (H) <=500 ng/mL FEU   Troponin I, High Sensitivity, Initial   Result Value Ref Range    Troponin I, High Sensitivity 93 (HH) 0 - 20 ng/L   B-type natriuretic peptide   Result Value Ref Range     (H) 0 - 99 pg/mL    Lipid panel   Result Value Ref Range    Cholesterol 154 0 - 199 mg/dL    HDL-Cholesterol 30.5 mg/dL    Cholesterol/HDL Ratio 5.0     LDL Calculated 103 (H) <=99 mg/dL    VLDL 20 0 - 40 mg/dL    Triglycerides 101 0 - 149 mg/dL    Non HDL Cholesterol 124 0 - 149 mg/dL   Sars-CoV-2 PCR   Result Value Ref Range    Coronavirus 2019, PCR Not Detected Not Detected   Influenza A, and B PCR   Result Value Ref Range    Flu A Result Not Detected Not Detected    Flu B Result Not Detected Not Detected   ECG 12 lead   Result Value Ref Range    Ventricular Rate 86 BPM    Atrial Rate 86 BPM    WI Interval 140 ms    QRS Duration 92 ms    QT Interval 374 ms    QTC Calculation(Bazett) 447 ms    P Axis 64 degrees    R Axis -55 degrees    T Axis 104 degrees    QRS Count 15 beats    Q Onset 208 ms    P Onset 138 ms    P Offset 185 ms    T Offset 395 ms    QTC Fredericia 421 ms   ECG 12 lead   Result Value Ref Range    Ventricular Rate 80 BPM    Atrial Rate 80 BPM    WI Interval 144 ms    QRS Duration 102 ms    QT Interval 380 ms    QTC Calculation(Bazett) 438 ms    P Axis 59 degrees    R Axis -53 degrees    T Axis 106 degrees    QRS Count 13 beats    Q Onset 222 ms    P Onset 150 ms    P Offset 204 ms    T Offset 412 ms    QTC Fredericia 418 ms   Drug Screen, Urine With Reflex to Confirmation   Result Value Ref Range    Amphetamine Screen, Urine Presumptive Negative Presumptive Negative    Barbiturate Screen, Urine Presumptive Negative Presumptive Negative    Benzodiazepines Screen, Urine Presumptive Negative Presumptive Negative    Cannabinoid Screen, Urine Presumptive Positive (A) Presumptive Negative    Cocaine Metabolite Screen, Urine Presumptive Negative Presumptive Negative    Fentanyl Screen, Urine Presumptive Negative Presumptive Negative    Opiate Screen, Urine Presumptive Negative Presumptive Negative    Oxycodone Screen, Urine Presumptive Negative Presumptive Negative    PCP Screen, Urine Presumptive Negative Presumptive  Negative    Methadone Screen, Urine Presumptive Negative Presumptive Negative   Troponin, High Sensitivity, 1 Hour   Result Value Ref Range    Troponin I, High Sensitivity 88 (HH) 0 - 20 ng/L   Urinalysis with Reflex Culture and Microscopic   Result Value Ref Range    Color, Urine Yellow Light-Yellow, Yellow, Dark-Yellow    Appearance, Urine Clear Clear    Specific Gravity, Urine 1.032 1.005 - 1.035    pH, Urine 6.5 5.0, 5.5, 6.0, 6.5, 7.0, 7.5, 8.0    Protein, Urine 30 (1+) (A) NEGATIVE, 10 (TRACE), 20 (TRACE) mg/dL    Glucose, Urine Normal Normal mg/dL    Blood, Urine NEGATIVE NEGATIVE    Ketones, Urine 10 (1+) (A) NEGATIVE mg/dL    Bilirubin, Urine NEGATIVE NEGATIVE    Urobilinogen, Urine 4 (2+) (A) Normal mg/dL    Nitrite, Urine NEGATIVE NEGATIVE    Leukocyte Esterase, Urine NEGATIVE NEGATIVE   Extra Urine Gray Tube   Result Value Ref Range    Extra Tube Hold for add-ons.    Lactate   Result Value Ref Range    Lactate 1.6 0.4 - 2.0 mmol/L   Urinalysis Microscopic   Result Value Ref Range    WBC, Urine 1-5 1-5, NONE /HPF    RBC, Urine 1-2 NONE, 1-2, 3-5 /HPF   Blood Culture    Specimen: Peripheral Venipuncture; Blood culture   Result Value Ref Range    Blood Culture No growth at 2 days    Blood Culture    Specimen: Peripheral Venipuncture; Blood culture   Result Value Ref Range    Blood Culture No growth at 2 days    Heparin Assay   Result Value Ref Range    Heparin Unfractionated 0.1 See Comment Below for Therapeutic Ranges IU/mL   CBC   Result Value Ref Range    WBC 10.7 4.4 - 11.3 x10*3/uL    nRBC 0.0 0.0 - 0.0 /100 WBCs    RBC 4.30 (L) 4.50 - 5.90 x10*6/uL    Hemoglobin 13.3 (L) 13.5 - 17.5 g/dL    Hematocrit 38.7 (L) 41.0 - 52.0 %    MCV 90 80 - 100 fL    MCH 30.9 26.0 - 34.0 pg    MCHC 34.4 32.0 - 36.0 g/dL    RDW 13.2 11.5 - 14.5 %    Platelets 232 150 - 450 x10*3/uL   Basic Metabolic Panel   Result Value Ref Range    Glucose 291 (H) 74 - 99 mg/dL    Sodium 136 136 - 145 mmol/L    Potassium 3.5 3.5 - 5.3  mmol/L    Chloride 103 98 - 107 mmol/L    Bicarbonate 26 21 - 32 mmol/L    Anion Gap 11 10 - 20 mmol/L    Urea Nitrogen 20 6 - 23 mg/dL    Creatinine 0.80 0.50 - 1.30 mg/dL    eGFR >90 >60 mL/min/1.73m*2    Calcium 9.0 8.6 - 10.3 mg/dL   Magnesium   Result Value Ref Range    Magnesium 2.12 1.60 - 2.40 mg/dL   Troponin I, High Sensitivity   Result Value Ref Range    Troponin I, High Sensitivity 37 (H) 0 - 20 ng/L   Heparin Assay   Result Value Ref Range    Heparin Unfractionated 0.1 See Comment Below for Therapeutic Ranges IU/mL   Heparin Assay   Result Value Ref Range    Heparin Unfractionated 0.1 See Comment Below for Therapeutic Ranges IU/mL   ECG 12 Lead   Result Value Ref Range    Ventricular Rate 75 BPM    Atrial Rate 75 BPM    IA Interval 136 ms    QRS Duration 100 ms    QT Interval 414 ms    QTC Calculation(Bazett) 462 ms    P Axis 71 degrees    R Axis -27 degrees    T Axis 123 degrees    QRS Count 13 beats    Q Onset 218 ms    P Onset 150 ms    P Offset 206 ms    T Offset 425 ms    QTC Fredericia 445 ms   Basic Metabolic Panel   Result Value Ref Range    Glucose 143 (H) 74 - 99 mg/dL    Sodium 138 136 - 145 mmol/L    Potassium 4.1 3.5 - 5.3 mmol/L    Chloride 103 98 - 107 mmol/L    Bicarbonate 27 21 - 32 mmol/L    Anion Gap 12 10 - 20 mmol/L    Urea Nitrogen 25 (H) 6 - 23 mg/dL    Creatinine 0.76 0.50 - 1.30 mg/dL    eGFR >90 >60 mL/min/1.73m*2    Calcium 8.7 8.6 - 10.3 mg/dL   CBC   Result Value Ref Range    WBC 18.1 (H) 4.4 - 11.3 x10*3/uL    nRBC 0.0 0.0 - 0.0 /100 WBCs    RBC 4.37 (L) 4.50 - 5.90 x10*6/uL    Hemoglobin 13.6 13.5 - 17.5 g/dL    Hematocrit 39.6 (L) 41.0 - 52.0 %    MCV 91 80 - 100 fL    MCH 31.1 26.0 - 34.0 pg    MCHC 34.3 32.0 - 36.0 g/dL    RDW 13.2 11.5 - 14.5 %    Platelets 265 150 - 450 x10*3/uL   Magnesium   Result Value Ref Range    Magnesium 2.00 1.60 - 2.40 mg/dL   ECG 12 lead   Result Value Ref Range    Ventricular Rate 62 BPM    Atrial Rate 62 BPM    IA Interval 138 ms    QRS  Duration 98 ms    QT Interval 458 ms    QTC Calculation(Bazett) 464 ms    P Axis 66 degrees    R Axis -29 degrees    T Axis 141 degrees    QRS Count 10 beats    Q Onset 220 ms    P Onset 151 ms    P Offset 207 ms    T Offset 449 ms    QTC Fredericia 463 ms   Transthoracic Echo (TTE) Complete   Result Value Ref Range    AV mn grad 7 mmHg    AV pk magdy 1.79 m/s    LV Biplane EF 66 %    LVOT diam 2.00 cm    MV E/A ratio 2.63     LA vol index A/L 35.6 ml/m2    LV EF 66 %    RV free wall pk S' 10.60 cm/s    RVSP 13.6 mmHg    LVIDd 5.34 cm    Aortic Valve Area by Continuity of Peak Velocity 2.23 cm2    AV pk grad 13 mmHg    Aortic Valve Area by Continuity of VTI 2.44 cm2    LV A4C EF 63.6         Cardiac Catheterization Procedure   Final Result      Transthoracic Echo (TTE) Complete   Final Result      CT angio chest for pulmonary embolism   Final Result   CARITO CONSOLIDATION EXTENDING FROM THE HILUS THROUGH THE MEDIAL   SEGMENT MIDDLE LOBE SUSPECTED PNEUMONIA        OTHER LESS CONSOLIDATIVE AIRSPACE DISEASE IN ALL OF THE OTHER LOBES   AND AT THE PERIPHERY OF THE MIDDLE LOBE CONSOLIDATION. THESE FINDINGS   ARE CLUSTERS OF VARIABLY SIZED SOLID AND GROUND-GLASS COMPOSITION   NODULES WITH INTERVENING HAZY GROUND-GLASS AIRSPACE DISEASE   THROUGHOUT THE REGIONS OF THE CLUSTER OF NODULES, SUSPECT FOR   PNEUMONIA IF A DIFFERENT VARIETY OR PERHAPS ONLY EARLY CONSOLIDATIVE        NO OTHER ACUTE DISEASE IN THE CHEST        NO ACUTE PULMONARY EMBOLISM THROUGH THE LOBAR BRANCH LEVEL        NO AORTIC DISSECTION OR OTHER ACUTE THORACIC AORTIC FINDINGS        NO PLEURAL OR PERICARDIAL EFFUSION        NO PNEUMOTHORAX        MACRO:   None        Signed by: Valente Walters 12/7/2024 11:54 AM   Dictation workstation:   [id number]      XR chest 2 views   Final Result   Bilateral perihilar infiltrates.        MACRO:   None        Signed by: Lizy Gamez 12/7/2024 10:31 AM   Dictation workstation:   [id number]          Transthoracic Echo  (TTE) Complete    Result Date: 12/9/2024          [place] [address]  Tel [phone] Fax [phone] TRANSTHORACIC ECHOCARDIOGRAM REPORT Patient Name:       MARISOL Saenz Physician:    [id number] Anup Navas MD Study Date:         12/9/2024           Ordering Provider:    [id number] BELLA DIAMOND MRN/PID:            [id number]            Fellow: Accession#:         [id number]        Nurse:                Shy Villa RN Date of Birth/Age:  1970 / 54      Sonographer:          Nikki Duvall RDCS Gender Assigned at                     Additional Staff: Birth: Height:             170.18 cm           Admit Date:           12/7/2024 Weight:             120.66 kg           Admission Status:     Inpatient -                                                               Routine BSA / BMI:          2.28 m2 / 41.66     Department Location:  [place] [address]                                     Echo Lab Blood Pressure: 170 /93 mmHg Study Type:    TRANSTHORACIC ECHO (TTE) COMPLETE Diagnosis/ICD: Non ST elevation (NSTEMI) myocardial infarction-I21.4 Indication:    NSTEMI CPT Codes:     Echo Complete w Full [id number] Patient History: Smoker:            Current. Pertinent History: HTN, CAD and Dyspnea. Study Detail: The following Echo studies were performed: 2D, M-Mode, Doppler and               color flow. Technically challenging study due to prominent lung               artifact and body habitus. Definity used as a contrast agent for               endocardial border definition. Total contrast used for this               procedure was 2 mL via IV push.  PHYSICIAN INTERPRETATION: Left Ventricle: The  left ventricular systolic function is normal, with a Espinosa's biplane calculated ejection fraction of 66%. There is moderate concentric left ventricular hypertrophy. There are no regional wall motion abnormalities. The left ventricular cavity size is normal. There is moderately increased septal and moderately increased posterior left ventricular wall thickness. Spectral Doppler shows a normal pattern of left ventricular diastolic filling. Left Atrium: The left atrium is mild to moderately dilated. Right Ventricle: The right ventricle is normal in size. There is normal right ventricular global systolic function. Right Atrium: The right atrium is normal in size. Aortic Valve: The aortic valve is trileaflet. There is mild aortic valve cusp calcification. The aortic valve dimensionless index is 0.78. There is no evidence of aortic valve regurgitation. The peak instantaneous gradient of the aortic valve is 13 mmHg. The mean gradient of the aortic valve is 7 mmHg. Mitral Valve: The mitral valve is mildly thickened. There is trace mitral valve regurgitation. Tricuspid Valve: The tricuspid valve is structurally normal. There is trace to mild tricuspid regurgitation. Pulmonic Valve: The pulmonic valve is not well visualized. There is no indication of pulmonic valve regurgitation. Pericardium: No pericardial effusion noted. Aorta: The aortic root is normal. Systemic Veins: The inferior vena cava appears normal in size, with IVC inspiratory collapse greater than 50%.  CONCLUSIONS:  1. The left ventricular systolic function is normal, with a Espinosa's biplane calculated ejection fraction of 66%.  2. There is normal right ventricular global systolic function.  3. The left atrium is mild to moderately dilated.  4. No significant changes from 8/2021.  5. There is moderately increased septal and moderately increased posterior left ventricular wall thickness. QUANTITATIVE DATA SUMMARY:  2D MEASUREMENTS:            Normal Ranges:  IVSd:            1.40 cm    (0.6-1.1cm) LVPWd:           1.40 cm    (0.6-1.1cm) LVIDd:           5.34 cm    (3.9-5.9cm) LVIDs:           3.69 cm LV Mass Index:   141.4 g/m2 LV % FS          30.9 %  LA VOLUME:                    Normal Ranges: LA Vol A4C:        79.6 ml    (22+/-6mL/m2) LA Vol A2C:        83.1 ml LA Vol BP:         81.3 ml LA Vol Index A4C:  34.9ml/m2 LA Vol Index A2C:  36.4 ml/m2 LA Vol Index BP:   35.6 ml/m2 LA Area A4C:       23.1 cm2 LA Area A2C:       23.6 cm2 LA Major Axis A4C: 5.7 cm LA Major Axis A2C: 5.7 cm LA Volume Index:   33.4 ml/m2  RA VOLUME BY A/L METHOD:            Normal Ranges: RA Vol A4C:              41.1 ml    (8.3-19.5ml) RA Vol Index A4C:        18.0 ml/m2 RA Area A4C:             15.4 cm2 RA Major Axis A4C:       4.9 cm  LV SYSTOLIC FUNCTION BY 2D PLANIMETRY (MOD):                      Normal Ranges: EF-A4C View:    64 % (>=55%) EF-A2C View:    67 % EF-Biplane:     66 % LV EF Reported: 66 %  LV DIASTOLIC FUNCTION:           Normal Ranges: MV Peak E:             1.13 m/s  (0.7-1.2 m/s) MV Peak A:             0.43 m/s  (0.42-0.7 m/s) E/A Ratio:             2.63      (1.0-2.2) MV e'                  0.075 m/s (>8.0) MV lateral e'          0.07 m/s MV medial e'           0.08 m/s E/e' Ratio:            15.13     (<8.0)  MITRAL VALVE:          Normal Ranges: MV DT:        161 msec (150-240msec)  AORTIC VALVE:                      Normal Ranges: AoV Vmax:                1.79 m/s  (<=1.7m/s) AoV Peak P.8 mmHg (<20mmHg) AoV Mean P.0 mmHg  (1.7-11.5mmHg) LVOT Max Shiv:            1.27 m/s  (<=1.1m/s) AoV VTI:                 29.10 cm  (18-25cm) LVOT VTI:                22.60 cm LVOT Diameter:           2.00 cm   (1.8-2.4cm) AoV Area, VTI:           2.44 cm2  (2.5-5.5cm2) AoV Area,Vmax:           2.23 cm2  (2.5-4.5cm2) AoV Dimensionless Index: 0.78  RIGHT VENTRICLE: RV Basal 3.87 cm RV Mid   2.72 cm RV Major 8.7 cm RV s'    0.11 m/s  TRICUSPID  VALVE/RVSP:          Normal Ranges: Peak TR Velocity:     1.63 m/s RV Syst Pressure:     14 mmHg  (< 30mmHg)  PULMONIC VALVE:          Normal Ranges: PV Accel Time:  132 msec (>120ms) PV Max Shiv:     1.0 m/s  (0.6-0.9m/s) PV Max P.3 mmHg  [id number] Anup Navas MD Electronically signed on 2024 at 2:39:14 PM  ** Final **      DISCHARGE MEDICATIONS        Your medication list        START taking these medications        Instructions Last Dose Given Next Dose Due   albuterol 90 mcg/actuation inhaler  Commonly known as: Ventolin HFA      Inhale 2 puffs every 4 hours if needed for wheezing or shortness of breath.       amLODIPine 10 mg tablet  Commonly known as: Norvasc  Start taking on: December 10, 2024      Take 1 tablet (10 mg) by mouth once daily.       amoxicillin-pot clavulanate 875-125 mg tablet  Commonly known as: Augmentin      Take 1 tablet by mouth 2 times a day for 2 days.       aspirin 81 mg chewable tablet  Start taking on: December 10, 2024      Chew 1 tablet (81 mg) once daily.       atorvastatin 40 mg tablet  Commonly known as: Lipitor      Take 1 tablet (40 mg) by mouth once daily at bedtime.       fluticasone propion-salmeteroL 100-50 mcg/dose diskus inhaler  Commonly known as: Advair Diskus      Inhale 1 puff 2 times a day. Rinse mouth with water after use to reduce aftertaste and incidence of candidiasis. Do not swallow.       hydroCHLOROthiazide 50 mg tablet  Commonly known as: HYDRODiuril  Start taking on: December 10, 2024      Take 1 tablet (50 mg) by mouth once daily.       ipratropium-albuteroL 0.5-2.5 mg/3 mL nebulizer solution  Commonly known as: Duo-Neb      Take 3 mL by nebulization every 6 hours if needed for wheezing.       isosorbide mononitrate ER 60 mg 24 hr tablet  Commonly known as: Imdur  Start taking on: December 10, 2024      Take 1 tablet (60 mg) by mouth once daily. Do not crush or chew.       nicotine 21 mg/24 hr patch  Commonly known as: Nicoderm CQ  Start taking  on: December 10, 2024      Place 1 patch over 24 hours on the skin once daily.       predniSONE 20 mg tablet  Commonly known as: Deltasone      Take 2 tablets (40 mg) by mouth once daily for 3 days.       valsartan 320 mg tablet  Commonly known as: Diovan  Start taking on: December 10, 2024      Take 1 tablet (320 mg) by mouth once daily.                 Where to Get Your Medications        These medications were sent to [id number] [address] AT [facility] & [address] [address]      Hours: 24-hours Phone: [phone]   albuterol 90 mcg/actuation inhaler  amLODIPine 10 mg tablet  amoxicillin-pot clavulanate 875-125 mg tablet  aspirin 81 mg chewable tablet  atorvastatin 40 mg tablet  fluticasone propion-salmeteroL 100-50 mcg/dose diskus inhaler  hydroCHLOROthiazide 50 mg tablet  ipratropium-albuteroL 0.5-2.5 mg/3 mL nebulizer solution  isosorbide mononitrate ER 60 mg 24 hr tablet  nicotine 21 mg/24 hr patch  predniSONE 20 mg tablet  valsartan 320 mg tablet         OUTPATIENT FOLLOW-UP     No future appointments.

## 2024-12-11 LAB
BACTERIA BLD CULT: NORMAL
BACTERIA BLD CULT: NORMAL
CARBOXYTHC UR-MCNC: 443 NG/ML

## 2024-12-15 LAB
ATRIAL RATE: 80 BPM
ATRIAL RATE: 86 BPM
P AXIS: 59 DEGREES
P AXIS: 64 DEGREES
P OFFSET: 185 MS
P OFFSET: 204 MS
P ONSET: 138 MS
P ONSET: 150 MS
PR INTERVAL: 140 MS
PR INTERVAL: 144 MS
Q ONSET: 208 MS
Q ONSET: 222 MS
QRS COUNT: 13 BEATS
QRS COUNT: 15 BEATS
QRS DURATION: 102 MS
QRS DURATION: 92 MS
QT INTERVAL: 374 MS
QT INTERVAL: 380 MS
QTC CALCULATION(BAZETT): 438 MS
QTC CALCULATION(BAZETT): 447 MS
QTC FREDERICIA: 418 MS
QTC FREDERICIA: 421 MS
R AXIS: -53 DEGREES
R AXIS: -55 DEGREES
T AXIS: 104 DEGREES
T AXIS: 106 DEGREES
T OFFSET: 395 MS
T OFFSET: 412 MS
VENTRICULAR RATE: 80 BPM
VENTRICULAR RATE: 86 BPM

## 2025-09-08 ENCOUNTER — APPOINTMENT (OUTPATIENT)
Dept: PRIMARY CARE | Facility: CLINIC | Age: 55
End: 2025-09-08
Payer: COMMERCIAL

## 2025-09-24 ENCOUNTER — APPOINTMENT (OUTPATIENT)
Dept: PRIMARY CARE | Facility: CLINIC | Age: 55
End: 2025-09-24
Payer: COMMERCIAL

## 2025-10-06 ENCOUNTER — APPOINTMENT (OUTPATIENT)
Dept: PRIMARY CARE | Facility: CLINIC | Age: 55
End: 2025-10-06
Payer: COMMERCIAL

## (undated) DEVICE — SHEATH, GLIDESHEATH, SLENDER, 6FR 10CM

## (undated) DEVICE — KIT, RADIAL, CUSTOM, ELYRIA

## (undated) DEVICE — CATHETER, OPTITORQUE, 5FR, JACKY, 3.5/ 2H/110CM, CURVED

## (undated) DEVICE — TUBING, MANIFOLD, LOW PRESSURE

## (undated) DEVICE — BAND, D-STAT RADIAL, TOPICAL HEMOSTAT